# Patient Record
Sex: FEMALE | Race: WHITE | NOT HISPANIC OR LATINO | Employment: UNEMPLOYED | URBAN - METROPOLITAN AREA
[De-identification: names, ages, dates, MRNs, and addresses within clinical notes are randomized per-mention and may not be internally consistent; named-entity substitution may affect disease eponyms.]

---

## 2017-06-03 RX ORDER — PANTOPRAZOLE SODIUM 40 MG/1
40 TABLET, DELAYED RELEASE ORAL DAILY
COMMUNITY
End: 2017-09-25

## 2017-06-03 RX ORDER — LANOLIN ALCOHOL/MO/W.PET/CERES
400 CREAM (GRAM) TOPICAL DAILY
COMMUNITY
End: 2017-09-25

## 2017-06-03 RX ORDER — POTASSIUM CHLORIDE 20 MEQ/1
20 TABLET, EXTENDED RELEASE ORAL DAILY
COMMUNITY
End: 2017-09-25

## 2017-06-03 RX ORDER — ERGOCALCIFEROL 1.25 MG/1
50000 CAPSULE ORAL
COMMUNITY
End: 2018-11-20

## 2017-06-03 RX ORDER — ZOLPIDEM TARTRATE 10 MG/1
5 TABLET ORAL NIGHTLY PRN
COMMUNITY
End: 2017-09-25

## 2017-06-03 RX ORDER — CYANOCOBALAMIN 1000 UG/ML
1000 INJECTION, SOLUTION INTRAMUSCULAR; SUBCUTANEOUS
COMMUNITY
End: 2017-09-25

## 2017-06-03 RX ORDER — HYDROXYCHLOROQUINE SULFATE 200 MG/1
200 TABLET, FILM COATED ORAL 2 TIMES DAILY
COMMUNITY
End: 2017-09-25

## 2017-09-25 ENCOUNTER — OFFICE VISIT (OUTPATIENT)
Dept: RHEUMATOLOGY | Facility: CLINIC | Age: 47
End: 2017-09-25
Payer: COMMERCIAL

## 2017-09-25 VITALS
DIASTOLIC BLOOD PRESSURE: 90 MMHG | BODY MASS INDEX: 26.47 KG/M2 | WEIGHT: 158.88 LBS | HEIGHT: 65 IN | SYSTOLIC BLOOD PRESSURE: 150 MMHG

## 2017-09-25 DIAGNOSIS — M05.79 RHEUMATOID ARTHRITIS INVOLVING MULTIPLE SITES WITH POSITIVE RHEUMATOID FACTOR: Primary | ICD-10-CM

## 2017-09-25 LAB
ALBUMIN SERPL-MCNC: 4 G/DL (ref 3.1–4.7)
ALP SERPL-CCNC: 49 IU/L (ref 40–104)
ALT (SGPT): 13 IU/L (ref 3–33)
AST SERPL-CCNC: 21 IU/L (ref 10–40)
BASOPHILS NFR BLD: 0.1 K/UL (ref 0–0.2)
BASOPHILS NFR BLD: 1 %
BILIRUB SERPL-MCNC: 0.9 MG/DL (ref 0.3–1)
BUN SERPL-MCNC: 6 MG/DL (ref 8–20)
CALCIUM SERPL-MCNC: 9 MG/DL (ref 7.7–10.4)
CHLORIDE: 105 MMOL/L (ref 98–110)
CO2 SERPL-SCNC: 23.3 MMOL/L (ref 22.8–31.6)
CREATININE: 0.8 MG/DL (ref 0.6–1.4)
EOSINOPHIL NFR BLD: 0.2 K/UL (ref 0–0.7)
EOSINOPHIL NFR BLD: 2.3 %
ERYTHROCYTE [DISTWIDTH] IN BLOOD BY AUTOMATED COUNT: 12.8 % (ref 11.7–14.9)
GLUCOSE: 99 MG/DL (ref 70–99)
GRAN #: 3.8 K/UL (ref 1.4–6.5)
GRAN%: 54.9 %
HCT VFR BLD AUTO: 42.8 % (ref 36–48)
HGB BLD-MCNC: 15.2 G/DL (ref 12–15)
IMMATURE GRANS (ABS): 0 K/UL (ref 0–1)
IMMATURE GRANULOCYTES: 0.3 %
LYMPH #: 2.5 K/UL (ref 1.2–3.4)
LYMPH%: 36.2 %
MCH RBC QN AUTO: 34.9 PG (ref 25–35)
MCHC RBC AUTO-ENTMCNC: 35.5 G/DL (ref 31–36)
MCV RBC AUTO: 98.4 FL (ref 79–98)
MONO #: 0.4 K/UL (ref 0.1–0.6)
MONO%: 5.3 %
NUCLEATED RBCS: 0 %
PLATELET # BLD AUTO: 319 K/UL (ref 140–440)
PMV BLD AUTO: 9.9 FL (ref 8.8–12.7)
POTASSIUM SERPL-SCNC: 3.8 MMOL/L (ref 3.5–5)
PROT SERPL-MCNC: 7.3 G/DL (ref 6–8.2)
RBC # BLD AUTO: 4.35 M/UL (ref 3.5–5.5)
SODIUM: 138 MMOL/L (ref 134–144)
WBC # BLD AUTO: 6.8 K/UL (ref 5–10)

## 2017-09-25 PROCEDURE — 99213 OFFICE O/P EST LOW 20 MIN: CPT | Mod: ,,, | Performed by: INTERNAL MEDICINE

## 2017-09-25 PROCEDURE — 3008F BODY MASS INDEX DOCD: CPT | Mod: ,,, | Performed by: INTERNAL MEDICINE

## 2017-09-25 RX ORDER — TRAMADOL HYDROCHLORIDE 50 MG/1
100 TABLET ORAL EVERY 8 HOURS PRN
Qty: 180 TABLET | Refills: 3 | Status: SHIPPED | OUTPATIENT
Start: 2017-09-25 | End: 2018-03-26 | Stop reason: SDUPTHER

## 2017-09-25 NOTE — PROGRESS NOTES
"       [facility] RHEUMATOLOGY            PROGRESS NOTE      Subjective:       Patient ID:   NAME: Ruma Nguyen : 1970     46 y.o. female    Referring Doc: No ref. provider found  Other Physicians:    Chief Complaint:  Rheumatoid Arthritis and Pain (8/10 left hand)      History of Present Illness:     Patient returns today for a regularly scheduled follow-up visit for    Rheumatoid arthritis   The patient did not restart plaquenil has not seen eye doctor stale. She is concerned about fluid retention. She will see her primary care physician in a few days. No cough or shortness of breath.    Pain in right third DIP joint.        ROS:   GEN:  No  fever, night sweats . weight is stable   + fatigue  SKIN: no rashes, no bruising, no ulcerations, no Raynaud's  HEENT: no HA's, No visual changes, no mucosal ulcers, no sicca symptoms,  CV:   no CP, SOB, PND, MUÑOZ, no orthopnea, no palpitations  PULM: normal with no SOB, cough, hemoptysis, sputum or pleuritic pain  GI:  no abdominal pain, nausea, vomiting, constipation, diarrhea, melanotic stools, BRBPR, hematemesis, no dysphagia  :   no dysuria  NEURO: no paresthesias, headaches, visual disturbances, muscle weakness  MUSCULOSKELETAL:no joint swelling, prolonged AM stiffness, no back pain, no muscle pain  Allergies:  Review of patient's allergies indicates:  No Known Allergies    Medications:    Current Outpatient Prescriptions:     aspirin (ECOTRIN) 81 MG EC tablet, Take 81 mg by mouth once daily., Disp: , Rfl:     ergocalciferol (VITAMIN D2) 50,000 unit Cap, Take 50,000 Units by mouth every 7 days., Disp: , Rfl:     tramadol (ULTRAM) 50 mg tablet, Take 2 tablets (100 mg total) by mouth every 8 (eight) hours as needed., Disp: 180 tablet, Rfl: 3    PMHx/PSHx Updates    Objective:     Vitals:  Blood pressure (!) 150/90, height 5' 5" (1.651 m), weight 72.1 kg (158 lb 14.4 oz).    Physical Examination:   GEN: no apparent distress, comfortable; AAOx3  SKIN: no " rashes,no ulceration, no Raynaud's, no petechiae, no SQ nodules,  HEAD: normal  EYES: no pallor, no icterus  NECK: no masses, thyroid normal, trachea midline, no LAD/LN's, supple  CV: RRR with nI/!V ELYSSA murmur; l S1 and S2 reg. ,no gallop no rubs,   CHEST: Normal respiratory effort; CTAB; normal breath sounds; no wheeze or crackles  ABDOM: nontender and nondistended; soft; no masses; no rebound/guarding  MUSC/Skeletal: ROM normal; no crepitus; joints without synovitis,  no deformities  No joint swelling or tenderness of PIP, MCP, wrist, elbow, shoulder, or knee joints  EXTREM: no clubbing, cyanosis, no edema,normal  pulses   NEURO: grossly intact; motor WNL; AAOx3; ,  PSYCH: normal mood, affect and behavior  LYMPH: normal cervical, supraclavicular          Labs:   Lab Results   Component Value Date    WBC 6.8 09/25/2017    HGB 15.2 (H) 09/25/2017    HCT 42.8 09/25/2017    MCV 98.4 (H) 09/25/2017     09/25/2017    CMP  @LASTLAB(NA,K,CL,CO2,GLU,BUN,Creatinine,Calcium,PROT,Albumin,Bilitot,Alkphos,AST,ALT,CRP,ESR,RF,CCP,HERNAN,SSA,CPK,uric acid) )@  I have reviewed all available lab results and radiology reports.    Radiology/Diagnostic Studies:        Assessment/Plan:   (1) 46 y.o. female with diagnosis of Rheumatoid arthritis.  She needs to see the eye doctor and eighth exam is okay restart Plaquenil as soon as possible      PLAN: Labs  She is going to see her primary care physician and has an appointment with a cardiologist.          Discussion:     I have explained all of the above in detail and the patient understands all of the current recommendation(s). I have answered all questions to the best of my ability and to their complete satisfaction.       The patient is to continue with the current management plan         RTC in         Electronically signed by Bella Crews MD

## 2017-09-26 LAB
CCP ANTIBODIES IGG/IGA: 9 UNITS (ref 0–19)
RHEUMATOID FACT SERPL-ACNC: 10.6 IU/ML (ref 0–13.9)

## 2017-09-27 LAB
CRP SERPL-MCNC: NORMAL MG/DL (ref 0–1.4)
MISCELLANEOUS LAB TEST ORDER: NORMAL

## 2017-10-10 PROBLEM — D68.61 ANTIPHOSPHOLIPID SYNDROME: Status: ACTIVE | Noted: 2017-10-10

## 2018-03-26 ENCOUNTER — OFFICE VISIT (OUTPATIENT)
Dept: RHEUMATOLOGY | Facility: CLINIC | Age: 48
End: 2018-03-26
Payer: COMMERCIAL

## 2018-03-26 VITALS — WEIGHT: 158 LBS | DIASTOLIC BLOOD PRESSURE: 72 MMHG | BODY MASS INDEX: 26.29 KG/M2 | SYSTOLIC BLOOD PRESSURE: 132 MMHG

## 2018-03-26 DIAGNOSIS — M05.79 RHEUMATOID ARTHRITIS INVOLVING MULTIPLE SITES WITH POSITIVE RHEUMATOID FACTOR: Primary | ICD-10-CM

## 2018-03-26 DIAGNOSIS — M54.2 NECK PAIN: ICD-10-CM

## 2018-03-26 DIAGNOSIS — D68.61 ANTIPHOSPHOLIPID SYNDROME: ICD-10-CM

## 2018-03-26 DIAGNOSIS — R20.2 PARESTHESIAS: ICD-10-CM

## 2018-03-26 LAB
ALBUMIN SERPL-MCNC: 3.9 G/DL (ref 3.1–4.7)
ALP SERPL-CCNC: 40 IU/L (ref 40–104)
ALT (SGPT): 12 IU/L (ref 3–33)
AST SERPL-CCNC: 19 IU/L (ref 10–40)
BASOPHILS NFR BLD: 0.1 K/UL (ref 0–0.2)
BASOPHILS NFR BLD: 1.1 %
BILIRUB SERPL-MCNC: 0.4 MG/DL (ref 0.3–1)
BUN SERPL-MCNC: 9 MG/DL (ref 8–20)
CALCIUM SERPL-MCNC: 9.1 MG/DL (ref 7.7–10.4)
CHLORIDE: 106 MMOL/L (ref 98–110)
CO2 SERPL-SCNC: 24.6 MMOL/L (ref 22.8–31.6)
CREATININE: 0.78 MG/DL (ref 0.6–1.4)
CRP SERPL-MCNC: 0.13 MG/DL (ref 0–1.4)
EOSINOPHIL NFR BLD: 0.2 K/UL (ref 0–0.7)
EOSINOPHIL NFR BLD: 3 %
ERYTHROCYTE [DISTWIDTH] IN BLOOD BY AUTOMATED COUNT: 12.3 % (ref 11.7–14.9)
GLUCOSE: 103 MG/DL (ref 70–99)
GRAN #: 2.5 K/UL (ref 1.4–6.5)
GRAN%: 45.5 %
HCT VFR BLD AUTO: 40.9 % (ref 36–48)
HGB BLD-MCNC: 14.5 G/DL (ref 12–15)
IMMATURE GRANS (ABS): 0 K/UL (ref 0–1)
IMMATURE GRANULOCYTES: 0 %
LYMPH #: 2.4 K/UL (ref 1.2–3.4)
LYMPH%: 44.9 %
MCH RBC QN AUTO: 35.3 PG (ref 25–35)
MCHC RBC AUTO-ENTMCNC: 35.5 G/DL (ref 31–36)
MCV RBC AUTO: 99.5 FL (ref 79–98)
MONO #: 0.3 K/UL (ref 0.1–0.6)
MONO%: 5.5 %
NUCLEATED RBCS: 0 %
PLATELET # BLD AUTO: 258 K/UL (ref 140–440)
PMV BLD AUTO: 9.6 FL (ref 8.8–12.7)
POTASSIUM SERPL-SCNC: 3.6 MMOL/L (ref 3.5–5)
PROT SERPL-MCNC: 7.1 G/DL (ref 6–8.2)
RBC # BLD AUTO: 4.11 M/UL (ref 3.5–5.5)
SODIUM: 139 MMOL/L (ref 134–144)
WBC # BLD AUTO: 5.4 K/UL (ref 5–10)

## 2018-03-26 PROCEDURE — 99213 OFFICE O/P EST LOW 20 MIN: CPT | Mod: ,,, | Performed by: INTERNAL MEDICINE

## 2018-03-26 RX ORDER — TRAMADOL HYDROCHLORIDE 50 MG/1
100 TABLET ORAL EVERY 8 HOURS PRN
Qty: 180 TABLET | Refills: 3 | Status: SHIPPED | OUTPATIENT
Start: 2018-03-26 | End: 2018-11-20 | Stop reason: SDUPTHER

## 2018-03-26 RX ORDER — HYDROXYCHLOROQUINE SULFATE 200 MG/1
200 TABLET, FILM COATED ORAL DAILY
COMMUNITY
End: 2018-03-26 | Stop reason: SDUPTHER

## 2018-03-26 RX ORDER — HYDROXYCHLOROQUINE SULFATE 200 MG/1
200 TABLET, FILM COATED ORAL DAILY
Qty: 60 TABLET | Refills: 3 | Status: SHIPPED | OUTPATIENT
Start: 2018-03-26 | End: 2018-06-27 | Stop reason: SDUPTHER

## 2018-03-26 NOTE — PROGRESS NOTES
[facility] RHEUMATOLOGY            PROGRESS NOTE      Subjective:       Patient ID:   NAME: Ruma Nguyen : 1970     47 y.o. female    Referring Doc: No ref. provider found  Other Physicians:    Chief Complaint:  Rheumatoid Arthritis      History of Present Illness:     Patient returns today for a regularly scheduled follow-up visit for   Rheumatoid  arthritis    The patient is doing well. No chest pains cough or shortness of breath. She has been experiencing neck pain with numbness and tingling radiating  into the right arm.  No muscle weakness.  No chest pains cough or shortness of breath. No prolonged morning stiffness or joint swelling.        ROS:   GEN:  No  fever, night sweats . weight is stable   + fatigue  SKIN: no rashes, no bruising, no ulcerations, no Raynaud's  HEENT: no HA's, No visual changes, no mucosal ulcers, no sicca symptoms,  CV:   no CP, SOB, PND, MUÑOZ, no orthopnea, no palpitations  PULM: normal with no SOB, cough, hemoptysis, sputum or pleuritic pain  GI:  no abdominal pain, nausea, vomiting, constipation, diarrhea, melanotic stools, BRBPR, hematemesis, no dysphagia  :   no dysuria  NEURO: no paresthesias, headaches, visual disturbances, muscle weakness  MUSCULOSKELETAL:no joint swelling, prolonged AM stiffness, + cervical back pain, no muscle pain  Allergies:  Review of patient's allergies indicates:  No Known Allergies    Medications:    Current Outpatient Prescriptions:     aspirin (ECOTRIN) 81 MG EC tablet, Take 81 mg by mouth once daily., Disp: , Rfl:     ergocalciferol (VITAMIN D2) 50,000 unit Cap, Take 50,000 Units by mouth every 7 days., Disp: , Rfl:     guaifenesin-codeine 100-10 mg/5 ml (TUSSI-ORGANIDIN NR)  mg/5 mL syrup, Take 5 mLs by mouth every 6 (six) hours as needed for Cough., Disp: 120 mL, Rfl: 0    hydroxychloroquine (PLAQUENIL) 200 mg tablet, Take 1 tablet (200 mg total) by mouth once daily., Disp: 60 tablet, Rfl: 3    traMADol (ULTRAM) 50 mg  tablet, Take 2 tablets (100 mg total) by mouth every 8 (eight) hours as needed., Disp: 180 tablet, Rfl: 3    PMHx/PSHx Updates:        Last Eye Exam      Objective:     Vitals:  Blood pressure 132/72, weight 71.7 kg (158 lb).    Physical Examination:   GEN: no apparent distress, comfortable; AAOx3  SKIN: no rashes,no ulceration, no Raynaud's, no petechiae, no SQ nodules,  HEAD: normal  EYES: no pallor, no icterus,  NECK: no masses, thyroid normal, trachea midline, no LAD/LN's, supple  CV: RRR with no murmur; l S1 and S2 reg. ,no gallop no rubs,   CHEST: Normal respiratory effort; CTAB; normal breath sounds; no wheeze or crackles  MUSC/Skeletal: ROM normal; no crepitus; joints without synovitis,  no deformities  No joint swelling or tenderness of PIP, MCP, wrist, elbow, shoulder, or knee joints  EXTREM: no clubbing, cyanosis, no edema,normal  pulses   NEURO: grossly intact; motor WNL; AAOx3; , DTR's symmetric  PSYCH: normal mood, affect and behavior  LYMPH: normal cervical, supraclavicular          Labs:   Lab Results   Component Value Date    WBC 6.8 09/25/2017    HGB 15.2 (H) 09/25/2017    HCT 42.8 09/25/2017    MCV 98.4 (H) 09/25/2017     09/25/2017    CMP  @LASTLAB(NA,K,CL,CO2,GLU,BUN,Creatinine,Calcium,PROT,Albumin,Bilitot,Alkphos,AST,ALT,CRP,ESR,RF,CCP,HERNAN,SSA,CPK,uric acid) )@  I have reviewed all available lab results and radiology reports.    Radiology/Diagnostic Studies:        Assessment/Plan:   (1) 47 y.o. female with diagnosis of Seronegative rheumatoid arthritis. This is stable.  2) neck pain with paresthesias on the right arm. Need to rule out HNP  3) history of antiphospholipid antibody being positive    CBC CMP CRP HERNAN SSA         Discussion:     I have explained all of the above in detail and the patient understands all of the current recommendation(s). I have answered all questions to the best of my ability and to their complete satisfaction.       The patient is to continue with the current  management plan         RTC in   3 months or before if needed      Electronically signed by Bella Crews MD

## 2018-03-29 LAB — ANA SER-ACNC: NEGATIVE

## 2018-06-27 ENCOUNTER — OFFICE VISIT (OUTPATIENT)
Dept: RHEUMATOLOGY | Facility: CLINIC | Age: 48
End: 2018-06-27
Payer: COMMERCIAL

## 2018-06-27 VITALS
BODY MASS INDEX: 25.28 KG/M2 | WEIGHT: 151.88 LBS | DIASTOLIC BLOOD PRESSURE: 72 MMHG | SYSTOLIC BLOOD PRESSURE: 118 MMHG

## 2018-06-27 DIAGNOSIS — M54.9 BACK PAIN, UNSPECIFIED BACK LOCATION, UNSPECIFIED BACK PAIN LATERALITY, UNSPECIFIED CHRONICITY: ICD-10-CM

## 2018-06-27 DIAGNOSIS — M06.019 RHEUMATOID ARTHRITIS INVOLVING SHOULDER WITH NEGATIVE RHEUMATOID FACTOR, UNSPECIFIED LATERALITY: Primary | ICD-10-CM

## 2018-06-27 PROCEDURE — 3008F BODY MASS INDEX DOCD: CPT | Mod: ,,, | Performed by: INTERNAL MEDICINE

## 2018-06-27 PROCEDURE — 99213 OFFICE O/P EST LOW 20 MIN: CPT | Mod: ,,, | Performed by: INTERNAL MEDICINE

## 2018-06-27 RX ORDER — TIZANIDINE 4 MG/1
4 TABLET ORAL EVERY 6 HOURS PRN
Qty: 30 TABLET | Refills: 1 | Status: SHIPPED | OUTPATIENT
Start: 2018-06-27 | End: 2018-07-07

## 2018-06-27 RX ORDER — KETOROLAC TROMETHAMINE 30 MG/ML
30 INJECTION, SOLUTION INTRAMUSCULAR; INTRAVENOUS ONCE
Status: SHIPPED | OUTPATIENT
Start: 2018-06-27 | End: 2018-06-30

## 2018-06-27 RX ORDER — HYDROXYCHLOROQUINE SULFATE 200 MG/1
200 TABLET, FILM COATED ORAL DAILY
Qty: 60 TABLET | Refills: 3 | Status: SHIPPED | OUTPATIENT
Start: 2018-06-27 | End: 2018-11-20 | Stop reason: SDUPTHER

## 2018-06-27 NOTE — PROGRESS NOTES
[facility] RHEUMATOLOGY            PROGRESS NOTE      Subjective:       Patient ID:   NAME: Ruma Nguyen : 1970     47 y.o. female    Referring Doc: No ref. provider found  Other Physicians:    Chief Complaint:  Rheumatoid Arthritis (Back and Neck Pain)      History of Present Illness:     Patient returns today for a regularly scheduled follow-up visit for  Rheumatoid arthritis.     The patient complaining of pain in the sacral area for the past week. No history of trauma that she fell and pain after twisting her torso and bending while cleaning her bathtub.  No paresthesias. No abdominal complaints. No prolonged morning stiffness or joint swelling. She has occasional neck pain. She did not do MRI of her cervical spine because her neck pain resolved completely until recently.            ROS:   GEN:  No  fever, night sweats . weight is stable   + fatigue  SKIN: no rashes, no bruising, no ulcerations, no Raynaud's  HEENT: no HA's, No visual changes, no mucosal ulcers, no sicca symptoms,  CV:   no CP, SOB, PND, MUÑOZ, no orthopnea, no palpitations  PULM: normal with no SOB, cough, hemoptysis, sputum or pleuritic pain  GI:  no abdominal pain, nausea, vomiting, constipation, diarrhea, melanotic stools, BRBPR, hematemesis, no dysphagia  :   no dysuria  NEURO: no paresthesias, headaches, visual disturbances, muscle weakness  MUSCULOSKELETAL:no joint swelling, prolonged AM stiffness, + back pain, + muscle pain  Allergies:  Review of patient's allergies indicates:  No Known Allergies    Medications:    Current Outpatient Prescriptions:     aspirin (ECOTRIN) 81 MG EC tablet, Take 81 mg by mouth once daily., Disp: , Rfl:     ergocalciferol (VITAMIN D2) 50,000 unit Cap, Take 50,000 Units by mouth every 7 days., Disp: , Rfl:     guaifenesin-codeine 100-10 mg/5 ml (TUSSI-ORGANIDIN NR)  mg/5 mL syrup, Take 5 mLs by mouth every 6 (six) hours as needed for Cough., Disp: 120 mL, Rfl: 0    hydroxychloroquine  (PLAQUENIL) 200 mg tablet, Take 1 tablet (200 mg total) by mouth once daily., Disp: 60 tablet, Rfl: 3    traMADol (ULTRAM) 50 mg tablet, Take 2 tablets (100 mg total) by mouth every 8 (eight) hours as needed., Disp: 180 tablet, Rfl: 3    tiZANidine (ZANAFLEX) 4 MG tablet, Take 1 tablet (4 mg total) by mouth every 6 (six) hours as needed., Disp: 30 tablet, Rfl: 1    Current Facility-Administered Medications:     ketorolac injection 30 mg, 30 mg, Intramuscular, Once, Bella Crews MD    PMHx/PSHx Updates:      Last Eye Exam: UTD      Objective:     Vitals:  Blood pressure 118/72, weight 68.9 kg (151 lb 14.4 oz).    Physical Examination:   GEN: no apparent distress, comfortable; AAOx3  SKIN: no rashes,no ulceration, no Raynaud's, no petechiae, no SQ nodules,  HEAD: normal  EYES: no pallor, no icterus  NECK: no masses, thyroid normal, trachea midline, no LAD/LN's, supple  CV: RRR with no murmur; l S1 and S2 reg. ,no gallop no rubs,   CHEST: Normal respiratory effort; CTAB; normal breath sounds; no wheeze or crackles  ABDOM: nontender and nondistended; soft; no masses; no rebound/guarding  MUSC/Skeletal: ROM normal; no crepitus; joints without synovitis,  no deformities  No joint swelling or tenderness of PIP, MCP, wrist, elbow, shoulder, or knee joints.I tenderness on the left sacroiliac area.  EXTREM: no clubbing, cyanosis, no edema,normal  pulses   NEURO: grossly intact; motor WNL; AAOx3;   PSYCH: normal mood, affect and behavior  LYMPH: normal cervical, supraclavicular          Labs:   Lab Results   Component Value Date    WBC 5.4 03/26/2018    HGB 14.5 03/26/2018    HCT 40.9 03/26/2018    MCV 99.5 (H) 03/26/2018     03/26/2018    CMP  @LASTLAB(NA,K,CL,CO2,GLU,BUN,Creatinine,Calcium,PROT,Albumin,Bilitot,Alkphos,AST,ALT,CRP,ESR,RF,CCP,HERNAN,SSA,CPK,uric acid) )@  I have reviewed all available lab results and radiology reports.    Radiology/Diagnostic Studies:        Assessment/Plan:   (1) 47 y.o.  female with diagnosis of Rheumatoid arthritis. This is stable.  2) acute low back pain, mainly in the sacral area. Probable sacroiliac sprain.    Plan:  Physical therapy.  Tizanidine 1-4 mg 3 times a day when necessary.      Discussion:     I have explained all of the above in detail and the patient understands all of the current recommendation(s). I have answered all questions to the best of my ability and to their complete satisfaction.       The patient is to continue with the current management plan         RTC in 4 months if doing well or before if she remains symptomatic.        Electronically signed by Bella Crews MD

## 2018-09-24 PROBLEM — F17.200 SMOKER: Status: ACTIVE | Noted: 2018-09-24

## 2018-09-24 PROBLEM — M06.09 RHEUMATOID ARTHRITIS OF MULTIPLE SITES WITHOUT RHEUMATOID FACTOR: Status: ACTIVE | Noted: 2018-09-24

## 2018-11-20 ENCOUNTER — OFFICE VISIT (OUTPATIENT)
Dept: RHEUMATOLOGY | Facility: CLINIC | Age: 48
End: 2018-11-20
Payer: COMMERCIAL

## 2018-11-20 VITALS
WEIGHT: 157.13 LBS | SYSTOLIC BLOOD PRESSURE: 131 MMHG | DIASTOLIC BLOOD PRESSURE: 83 MMHG | HEART RATE: 62 BPM | BODY MASS INDEX: 26.14 KG/M2

## 2018-11-20 DIAGNOSIS — M06.019 RHEUMATOID ARTHRITIS INVOLVING SHOULDER WITH NEGATIVE RHEUMATOID FACTOR, UNSPECIFIED LATERALITY: Primary | ICD-10-CM

## 2018-11-20 LAB
ALBUMIN SERPL-MCNC: 4.1 G/DL (ref 3.1–4.7)
ALP SERPL-CCNC: 51 IU/L (ref 40–104)
ALT (SGPT): 13 IU/L (ref 3–33)
AST SERPL-CCNC: 17 IU/L (ref 10–40)
BASOPHILS NFR BLD: 0.1 K/UL (ref 0–0.2)
BASOPHILS NFR BLD: 0.9 %
BILIRUB SERPL-MCNC: 0.5 MG/DL (ref 0.3–1)
BUN SERPL-MCNC: 8 MG/DL (ref 8–20)
CALCIUM SERPL-MCNC: 9 MG/DL (ref 7.7–10.4)
CHLORIDE: 105 MMOL/L (ref 98–110)
CO2 SERPL-SCNC: 26.6 MMOL/L (ref 22.8–31.6)
CREATININE: 0.83 MG/DL (ref 0.6–1.4)
EOSINOPHIL NFR BLD: 0.2 K/UL (ref 0–0.7)
EOSINOPHIL NFR BLD: 2.8 %
ERYTHROCYTE [DISTWIDTH] IN BLOOD BY AUTOMATED COUNT: 12.8 % (ref 11.7–14.9)
GLUCOSE: 82 MG/DL (ref 70–99)
GRAN #: 3.1 K/UL (ref 1.4–6.5)
GRAN%: 47.8 %
HCT VFR BLD AUTO: 43.1 % (ref 36–48)
HGB BLD-MCNC: 15.2 G/DL (ref 12–15)
IMMATURE GRANS (ABS): 0 K/UL (ref 0–1)
IMMATURE GRANULOCYTES: 0.2 %
LYMPH #: 2.9 K/UL (ref 1.2–3.4)
LYMPH%: 44.4 %
MAGNESIUM SERPL-MCNC: 1.9 MG/DL (ref 1.5–2.6)
MCH RBC QN AUTO: 35.9 PG (ref 25–35)
MCHC RBC AUTO-ENTMCNC: 35.3 G/DL (ref 31–36)
MCV RBC AUTO: 101.9 FL (ref 79–98)
MONO #: 0.3 K/UL (ref 0.1–0.6)
MONO%: 3.9 %
NUCLEATED RBCS: 0 %
PLATELET # BLD AUTO: 307 K/UL (ref 140–440)
PMV BLD AUTO: 9.3 FL (ref 8.8–12.7)
POTASSIUM SERPL-SCNC: 3.6 MMOL/L (ref 3.5–5)
PROT SERPL-MCNC: 7.5 G/DL (ref 6–8.2)
RBC # BLD AUTO: 4.23 M/UL (ref 3.5–5.5)
SODIUM: 140 MMOL/L (ref 134–144)
WBC # BLD AUTO: 6.5 K/UL (ref 5–10)

## 2018-11-20 PROCEDURE — 99213 OFFICE O/P EST LOW 20 MIN: CPT | Mod: ,,, | Performed by: INTERNAL MEDICINE

## 2018-11-20 RX ORDER — HYDROXYCHLOROQUINE SULFATE 200 MG/1
200 TABLET, FILM COATED ORAL DAILY
Qty: 60 TABLET | Refills: 3 | Status: SHIPPED | OUTPATIENT
Start: 2018-11-20 | End: 2019-11-23 | Stop reason: SDUPTHER

## 2018-11-20 RX ORDER — TRAMADOL HYDROCHLORIDE 50 MG/1
100 TABLET ORAL EVERY 8 HOURS PRN
Qty: 180 TABLET | Refills: 3 | Status: SHIPPED | OUTPATIENT
Start: 2018-11-20 | End: 2019-04-22 | Stop reason: SDUPTHER

## 2018-11-20 NOTE — PROGRESS NOTES
[facility] RHEUMATOLOGY            PROGRESS NOTE      Subjective:       Patient ID:   NAME: Ruma Nguyen : 1970     47 y.o. female    Referring Doc: No ref. provider found  Other Physicians:    Chief Complaint:  Rheumatoid Arthritis      History of Present Illness:     Patient returns today for a regularly scheduled follow-up visit for   Rheumatoid arthritis    The patient has occasional arthralgias and finger joints but no joint swelling. No prolonged morning stiffness. At present with sinus congestion. No chest pains. Occasional cough. No shortness of breath.no fevers            ROS:   GEN:  No  fever, night sweats . weight is stable   + mild  fatigue  SKIN: no rashes, no bruising, no ulcerations, no Raynaud's  HEENT: no HA's, No visual changes, no mucosal ulcers, no sicca symptoms,  CV:   no CP, SOB, PND, MUÑOZ, no orthopnea, no palpitations  PULM: normal with no SOB, cough, hemoptysis, sputum or pleuritic pain  GI:  no abdominal pain, nausea, vomiting, constipation, diarrhea, melanotic stools, BRBPR, hematemesis, no dysphagia  :   no dysuria  NEURO: no paresthesias, headaches, visual disturbances, muscle weakness  MUSCULOSKELETAL:no joint swelling, prolonged AM stiffness, no back pain, + muscle pain  Allergies:  Review of patient's allergies indicates:  No Known Allergies    Medications:    Current Outpatient Medications:     aspirin (ECOTRIN) 81 MG EC tablet, Take 81 mg by mouth once daily., Disp: , Rfl:     hydroxychloroquine (PLAQUENIL) 200 mg tablet, Take 1 tablet (200 mg total) by mouth once daily., Disp: 60 tablet, Rfl: 3    traMADol (ULTRAM) 50 mg tablet, Take 2 tablets (100 mg total) by mouth every 8 (eight) hours as needed., Disp: 180 tablet, Rfl: 3    PMHx/PSHx Updates:      Last Eye Exam UTD    Objective:     Vitals:  Blood pressure 131/83, pulse 62, weight 71.3 kg (157 lb 1.6 oz).    Physical Examination:   GEN: no apparent distress, comfortable; AAOx3  SKIN: no rashes,no  ulceration, no Raynaud's, no petechiae, no SQ nodules,  HEAD: normal  EYES: no pallor, no icterus  NECK: no masses, thyroid normal, trachea midline, no LAD/LN's, supple  CV: RRR with no murmur; l S1 and S2 reg. ,no gallop no rubs,   CHEST: Normal respiratory effort; CTAB; normal breath sounds; no wheeze or crackles  MUSC/Skeletal: ROM normal; no crepitus; joints without synovitis,  no deformities  No joint swelling or tenderness of PIP, MCP, wrist, elbow, shoulder, or knee joints  EXTREM: no clubbing, cyanosis, no edema,normal  pulses   NEURO: grossly intact; motor WNL; AAOx3; ,  PSYCH: normal mood, affect and behavior  LYMPH: normal cervical, supraclavicular          Labs:   Lab Results   Component Value Date    WBC 4.81 09/22/2018    HGB 15.3 09/22/2018    HCT 42.9 09/22/2018    MCV 98 09/22/2018     09/22/2018    CMP  @LASTLAB(NA,K,CL,CO2,GLU,BUN,Creatinine,Calcium,PROT,Albumin,Bilitot,Alkphos,AST,ALT,CRP,ESR,RF,CCP,HERNAN,SSA,CPK,uric acid) )@  I have reviewed all available lab results and radiology reports.    Radiology/Diagnostic Studies:    Eye exam is up-to-date    Assessment/Plan:   (1) 47 y.o. female with diagnosis of rheumatoid arthritis. She is stable on Plaquenil.  2) Sinus Congestion: will fu with PCP      CBC CMP        Discussion:     I have explained all of the above in detail and the patient understands all of the current recommendation(s). I have answered all questions to the best of my ability and to their complete satisfaction.       The patient is to continue with the current management plan         RTC in         Electronically signed by Bella Crews MD

## 2018-11-23 LAB
B2 GLYCOPROTEIN I IGA: 36 GPI IGA UNITS (ref 0–25)
B2 GLYCOPROTEIN I IGG: <9 GPI IGG UNITS (ref 0–20)
B2 GLYCOPROTEIN I IGM: 81 GPI IGM UNITS (ref 0–32)
CARDIOLIPIN IGA SER IA-ACNC: 21 APL U/ML (ref 0–11)
CARDIOLIPIN IGG SER IA-ACNC: 17 GPL U/ML (ref 0–14)
CARDIOLIPIN IGM SER IA-ACNC: 85 MPL U/ML (ref 0–12)

## 2019-04-11 ENCOUNTER — OFFICE VISIT (OUTPATIENT)
Dept: RHEUMATOLOGY | Facility: CLINIC | Age: 49
End: 2019-04-11
Payer: COMMERCIAL

## 2019-04-11 VITALS — DIASTOLIC BLOOD PRESSURE: 74 MMHG | BODY MASS INDEX: 26.54 KG/M2 | WEIGHT: 159.5 LBS | SYSTOLIC BLOOD PRESSURE: 118 MMHG

## 2019-04-11 DIAGNOSIS — M06.00 RHEUMATOID ARTHRITIS WITH NEGATIVE RHEUMATOID FACTOR, INVOLVING UNSPECIFIED SITE: ICD-10-CM

## 2019-04-11 DIAGNOSIS — R76.0 ANTIPHOSPHOLIPID ANTIBODY POSITIVE: Primary | ICD-10-CM

## 2019-04-11 LAB
ALBUMIN SERPL-MCNC: 3.8 G/DL (ref 3.1–4.7)
ALP SERPL-CCNC: 48 IU/L (ref 40–104)
ALT (SGPT): 14 IU/L (ref 3–33)
AST SERPL-CCNC: 21 IU/L (ref 10–40)
BASOPHILS NFR BLD: 0 K/UL (ref 0–0.2)
BASOPHILS NFR BLD: 0.6 %
BILIRUB SERPL-MCNC: 0.8 MG/DL (ref 0.3–1)
BUN SERPL-MCNC: 8 MG/DL (ref 8–20)
CALCIUM SERPL-MCNC: 9 MG/DL (ref 7.7–10.4)
CHLORIDE: 103 MMOL/L (ref 98–110)
CO2 SERPL-SCNC: 25.7 MMOL/L (ref 22.8–31.6)
CREATININE: 0.71 MG/DL (ref 0.6–1.4)
CRP SERPL-MCNC: 0.7 MG/DL (ref 0–1.4)
EOSINOPHIL NFR BLD: 0.1 K/UL (ref 0–0.7)
EOSINOPHIL NFR BLD: 1.7 %
ERYTHROCYTE [DISTWIDTH] IN BLOOD BY AUTOMATED COUNT: 12.9 % (ref 11.7–14.9)
GLUCOSE: 96 MG/DL (ref 70–99)
GRAN #: 3.4 K/UL (ref 1.4–6.5)
GRAN%: 52.4 %
HCT VFR BLD AUTO: 42 % (ref 36–48)
HGB BLD-MCNC: 14.8 G/DL (ref 12–15)
IMMATURE GRANS (ABS): 0 K/UL (ref 0–1)
IMMATURE GRANULOCYTES: 0.3 %
LYMPH #: 2.6 K/UL (ref 1.2–3.4)
LYMPH%: 39.5 %
MCH RBC QN AUTO: 35.1 PG (ref 25–35)
MCHC RBC AUTO-ENTMCNC: 35.2 G/DL (ref 31–36)
MCV RBC AUTO: 99.5 FL (ref 79–98)
MONO #: 0.4 K/UL (ref 0.1–0.6)
MONO%: 5.5 %
NUCLEATED RBCS: 0 %
PLATELET # BLD AUTO: 257 K/UL (ref 140–440)
PMV BLD AUTO: 9.7 FL (ref 8.8–12.7)
POTASSIUM SERPL-SCNC: 4 MMOL/L (ref 3.5–5)
PROT SERPL-MCNC: 7.4 G/DL (ref 6–8.2)
RBC # BLD AUTO: 4.22 M/UL (ref 3.5–5.5)
SODIUM: 138 MMOL/L (ref 134–144)
WBC # BLD AUTO: 6.6 K/UL (ref 5–10)

## 2019-04-11 PROCEDURE — 99213 PR OFFICE/OUTPT VISIT, EST, LEVL III, 20-29 MIN: ICD-10-PCS | Mod: ,,, | Performed by: INTERNAL MEDICINE

## 2019-04-11 PROCEDURE — 99213 OFFICE O/P EST LOW 20 MIN: CPT | Mod: ,,, | Performed by: INTERNAL MEDICINE

## 2019-04-11 RX ORDER — HYDROXYCHLOROQUINE SULFATE 200 MG/1
200 TABLET, FILM COATED ORAL DAILY
Qty: 90 TABLET | Refills: 1 | Status: SHIPPED | OUTPATIENT
Start: 2019-04-11 | End: 2020-06-11 | Stop reason: SDUPTHER

## 2019-04-11 NOTE — PROGRESS NOTES
[facility] RHEUMATOLOGY            PROGRESS NOTE      Subjective:       Patient ID:   NAME: Ruma Nguyen : 1970     48 y.o. female    Referring Doc: No ref. provider found  Other Physicians:    Chief Complaint:  Rheumatoid Arthritis      History of Present Illness:     Patient returns today for a regularly scheduled follow-up visit for  Seronegative RA and hx + antiphospholipids     The patient arthralgias but no joint swelling. She has had open respiratory infection and G gastroenteritis since her last visit doing okay now except mild nasal congestion. Occasional cough. No chest pains. No shortness of breath. No prolonged morning stiffness or joint swelling.            ROS:   GEN:  No  fever, night sweats . weight is stable   + fatigue  SKIN: no rashes, no bruising, no ulcerations, no Raynaud's  HEENT: no HA's, No visual changes, no mucosal ulcers, no sicca symptoms,  CV:   no CP, SOB, PND, MUÑOZ, no orthopnea, no palpitations  PULM: normal with no SOB, cough, hemoptysis, sputum or pleuritic pain  GI:  no abdominal pain, nausea, vomiting, constipation, diarrhea, melanotic stools, BRBPR, hematemesis, no dysphagia  :   no dysuria  NEURO: no paresthesias, headaches, visual disturbances, muscle weakness  MUSCULOSKELETAL:no joint swelling, prolonged AM stiffness, no back pain, + muscle pain  Allergies:  Review of patient's allergies indicates:  No Known Allergies    Medications:    Current Outpatient Medications:     aspirin (ECOTRIN) 81 MG EC tablet, Take 81 mg by mouth once daily., Disp: , Rfl:     hydroxychloroquine (PLAQUENIL) 200 mg tablet, Take 1 tablet (200 mg total) by mouth once daily., Disp: 60 tablet, Rfl: 3    traMADol (ULTRAM) 50 mg tablet, Take 2 tablets (100 mg total) by mouth every 8 (eight) hours as needed., Disp: 180 tablet, Rfl: 3    PMHx/PSHx Updates:        Last Eye Exam UTD      Objective:     Vitals:  Blood pressure 118/74, weight 72.3 kg (159 lb 8 oz).    Physical Examination:    GEN: no apparent distress, comfortable; AAOx3  SKIN: no rashes,no ulceration, no Raynaud's, no petechiae, no SQ nodules,  HEAD: normal  EYES: no pallor, no icterus  NECK: no masses, thyroid normal, trachea midline, no LAD/LN's, supple  CV: RRR with no murmur; l S1 and S2 reg. ,no gallop no rubs,   CHEST: Normal respiratory effort; CTAB; normal breath sounds; no wheeze or crackles  MUSC/Skeletal: ROM normal; no crepitus; joints without synovitis,  no deformities  No joint swelling or tenderness of PIP, MCP, wrist, elbow, shoulder, or knee joints  EXTREM: no clubbing, cyanosis, no edema,normal  pulses   NEURO: grossly intact; motor WNL; AAOx3; ,   PSYCH: normal mood, affect and behavior  LYMPH: normal cervical, supraclavicular          Labs:   Lab Results   Component Value Date    WBC 6.5 11/20/2018    HGB 15.2 (H) 11/20/2018    HCT 43.1 11/20/2018    .9 (H) 11/20/2018     11/20/2018    CMP  @LASTLAB(NA,K,CL,CO2,GLU,BUN,Creatinine,Calcium,PROT,Albumin,Bilitot,Alkphos,AST,ALT,CRP,ESR,RF,CCP,HERNAN,SSA,CPK,uric acid) )@  I have reviewed all available lab results and radiology reports.    Radiology/Diagnostic Studies:        Assessment/Plan:   (1) 48 y.o. female with diagnosis of Rheumatoid Arthritis.( sero neg)  This is stable  2) history of positive antiphospholipid. No history of thromboembolic events.  3)Osteoarthritis      CBC CMP urinalysis        Discussion:     I have explained all of the above in detail and the patient understands all of the current recommendation(s). I have answered all questions to the best of my ability and to their complete satisfaction.       The patient is to continue with the current management plan         RTC in   4 months      Electronically signed by Bella Crews MD

## 2019-04-15 DIAGNOSIS — E04.1 RIGHT THYROID NODULE: Primary | ICD-10-CM

## 2019-04-15 NOTE — PROGRESS NOTES
Patient notified of UTI. Patient denies bactrim/sulfa allergy. Bactrim DS #10 one po bid x 5 days 0 refills called into pharmacy.

## 2019-04-22 RX ORDER — TRAMADOL HYDROCHLORIDE 50 MG/1
TABLET ORAL
Qty: 180 TABLET | Refills: 1 | Status: SHIPPED | OUTPATIENT
Start: 2019-04-22 | End: 2019-07-17 | Stop reason: SDUPTHER

## 2019-07-17 RX ORDER — TRAMADOL HYDROCHLORIDE 50 MG/1
TABLET ORAL
Qty: 180 TABLET | Refills: 1 | Status: SHIPPED | OUTPATIENT
Start: 2019-07-17 | End: 2019-10-02 | Stop reason: SDUPTHER

## 2019-07-30 ENCOUNTER — OFFICE VISIT (OUTPATIENT)
Dept: RHEUMATOLOGY | Facility: CLINIC | Age: 49
End: 2019-07-30
Payer: COMMERCIAL

## 2019-07-30 ENCOUNTER — LAB VISIT (OUTPATIENT)
Dept: LAB | Facility: HOSPITAL | Age: 49
End: 2019-07-30
Attending: INTERNAL MEDICINE
Payer: COMMERCIAL

## 2019-07-30 VITALS — WEIGHT: 160 LBS | SYSTOLIC BLOOD PRESSURE: 120 MMHG | BODY MASS INDEX: 26.63 KG/M2 | DIASTOLIC BLOOD PRESSURE: 72 MMHG

## 2019-07-30 DIAGNOSIS — M05.79 RHEUMATOID ARTHRITIS INVOLVING MULTIPLE SITES WITH POSITIVE RHEUMATOID FACTOR: Primary | ICD-10-CM

## 2019-07-30 DIAGNOSIS — M75.51 BURSITIS OF SHOULDER, RIGHT: ICD-10-CM

## 2019-07-30 DIAGNOSIS — M05.79 RHEUMATOID ARTHRITIS INVOLVING MULTIPLE SITES WITH POSITIVE RHEUMATOID FACTOR: ICD-10-CM

## 2019-07-30 LAB
ALBUMIN SERPL BCP-MCNC: 4.1 G/DL (ref 3.5–5.2)
ALP SERPL-CCNC: 40 U/L (ref 55–135)
ALT SERPL W/O P-5'-P-CCNC: 14 U/L (ref 10–44)
ANION GAP SERPL CALC-SCNC: 8 MMOL/L (ref 8–16)
AST SERPL-CCNC: 17 U/L (ref 10–40)
BASOPHILS # BLD AUTO: 0.06 K/UL (ref 0–0.2)
BASOPHILS NFR BLD: 1 % (ref 0–1.9)
BILIRUB SERPL-MCNC: 0.7 MG/DL (ref 0.1–1)
BUN SERPL-MCNC: 7 MG/DL (ref 6–20)
CALCIUM SERPL-MCNC: 9.2 MG/DL (ref 8.7–10.5)
CHLORIDE SERPL-SCNC: 106 MMOL/L (ref 95–110)
CO2 SERPL-SCNC: 26 MMOL/L (ref 23–29)
CREAT SERPL-MCNC: 0.7 MG/DL (ref 0.5–1.4)
CRP SERPL-MCNC: 1.01 MG/DL (ref 0–0.75)
DIFFERENTIAL METHOD: ABNORMAL
EOSINOPHIL # BLD AUTO: 0.2 K/UL (ref 0–0.5)
EOSINOPHIL NFR BLD: 2.5 % (ref 0–8)
ERYTHROCYTE [DISTWIDTH] IN BLOOD BY AUTOMATED COUNT: 12.9 % (ref 11.5–14.5)
EST. GFR  (AFRICAN AMERICAN): >60 ML/MIN/1.73 M^2
EST. GFR  (NON AFRICAN AMERICAN): >60 ML/MIN/1.73 M^2
GLUCOSE SERPL-MCNC: 83 MG/DL (ref 70–110)
HCT VFR BLD AUTO: 41.7 % (ref 37–48.5)
HGB BLD-MCNC: 14.6 G/DL (ref 12–16)
IMM GRANULOCYTES # BLD AUTO: 0.01 K/UL (ref 0–0.04)
IMM GRANULOCYTES NFR BLD AUTO: 0.2 % (ref 0–0.5)
LYMPHOCYTES # BLD AUTO: 2.3 K/UL (ref 1–4.8)
LYMPHOCYTES NFR BLD: 38 % (ref 18–48)
MCH RBC QN AUTO: 35.3 PG (ref 27–31)
MCHC RBC AUTO-ENTMCNC: 35 G/DL (ref 32–36)
MCV RBC AUTO: 101 FL (ref 82–98)
MONOCYTES # BLD AUTO: 0.4 K/UL (ref 0.3–1)
MONOCYTES NFR BLD: 5.9 % (ref 4–15)
NEUTROPHILS # BLD AUTO: 3.1 K/UL (ref 1.8–7.7)
NEUTROPHILS NFR BLD: 52.4 % (ref 38–73)
NRBC BLD-RTO: 0 /100 WBC
PLATELET # BLD AUTO: 260 K/UL (ref 150–350)
PMV BLD AUTO: 10.3 FL (ref 9.2–12.9)
POTASSIUM SERPL-SCNC: 3.8 MMOL/L (ref 3.5–5.1)
PROT SERPL-MCNC: 7.5 G/DL (ref 6–8.4)
RBC # BLD AUTO: 4.14 M/UL (ref 4–5.4)
SODIUM SERPL-SCNC: 140 MMOL/L (ref 136–145)
WBC # BLD AUTO: 5.92 K/UL (ref 3.9–12.7)

## 2019-07-30 PROCEDURE — 85651 RBC SED RATE NONAUTOMATED: CPT

## 2019-07-30 PROCEDURE — 85025 COMPLETE CBC W/AUTO DIFF WBC: CPT

## 2019-07-30 PROCEDURE — 80053 COMPREHEN METABOLIC PANEL: CPT

## 2019-07-30 PROCEDURE — 99213 PR OFFICE/OUTPT VISIT, EST, LEVL III, 20-29 MIN: ICD-10-PCS | Mod: S$GLB,,, | Performed by: INTERNAL MEDICINE

## 2019-07-30 PROCEDURE — 86140 C-REACTIVE PROTEIN: CPT

## 2019-07-30 PROCEDURE — 99213 OFFICE O/P EST LOW 20 MIN: CPT | Mod: S$GLB,,, | Performed by: INTERNAL MEDICINE

## 2019-07-30 NOTE — PROGRESS NOTES
[facility] RHEUMATOLOGY            PROGRESS NOTE      Subjective:       Patient ID:   NAME: Ruma Nguyen : 1970     48 y.o. female    Referring Doc: No ref. provider found  Other Physicians:    Chief Complaint:  Rheumatoid Arthritis      History of Present Illness:     Patient returns today for a regularly scheduled follow-up visit for    RA  The patient is doing well except pain in the right shoulder with abduction. No history of trauma. No paresthesias except occasional and feet. No chest pains cough or shortness of breath.  atient arthralgias and right fifth DIP joint.          ROS:   GEN:  No  fever, night sweats . weight is stable   + fatigue  SKIN: no rashes, no bruising, no ulcerations, no Raynaud's  HEENT: no HA's, No visual changes, no mucosal ulcers, no sicca symptoms,  CV:   no CP, SOB, PND, MUÑOZ, no orthopnea, no palpitations  PULM: normal with no SOB, cough, hemoptysis, sputum or pleuritic pain  GI:  no abdominal pain, nausea, vomiting, constipation, diarrhea, melanotic stools, BRBPR, hematemesis, no dysphagia  :   no dysuria  NEURO: no paresthesias, headaches, visual disturbances, muscle weakness  MUSCULOSKELETAL:no joint swelling, prolonged AM stiffness, no back pain, + muscle pain  Allergies:  Review of patient's allergies indicates:  No Known Allergies    Medications:    Current Outpatient Medications:     aspirin (ECOTRIN) 81 MG EC tablet, Take 81 mg by mouth once daily., Disp: , Rfl:     hydroxychloroquine (PLAQUENIL) 200 mg tablet, Take 1 tablet (200 mg total) by mouth once daily., Disp: 90 tablet, Rfl: 1    traMADol (ULTRAM) 50 mg tablet, TAKE 2 TABLETS BY MOUTH EVERY 8 HOURS AS NEEDED., Disp: 180 tablet, Rfl: 1    PMHx/PSHx Updates:      Last Eye Exam was due 6 months ago      Objective:     Vitals:  Blood pressure 120/72, weight 72.6 kg (160 lb).    Physical Examination:   GEN: no apparent distress, comfortable; AAOx3  SKIN: no rashes,no ulceration, no Raynaud's, no  petechiae, no SQ nodules,  HEAD: normal  EYES: no pallor, no icterus  NECK: no masses, thyroid normal, trachea midline, no LAD/LN's, supple  CV: RRR with no murmur; l S1 and S2 reg. ,no gallop no rubs,   CHEST: Normal respiratory effort; CTAB; normal breath sounds; no wheeze or crackles  MUSC/Skeletal: ROM normal; no crepitus; joints without synovitis,  no deformities  No joint swelling or tenderness of PIP, MCP, wrist, elbow, shoulder, or knee joints.right shoulder without swelling .Abduction to 95   internal/external rotation preserved. Negative droprm  EXTREM: no clubbing, cyanosis, no edema,normal  pulses   NEURO: grossly intact; motor WNL; AAOx3; ,  PSYCH: normal mood, affect and behavior  LYMPH: normal cervical, supraclavicular          Labs:   Lab Results   Component Value Date    WBC 6.6 04/11/2019    HGB 14.8 04/11/2019    HCT 42.0 04/11/2019    MCV 99.5 (H) 04/11/2019     04/11/2019    CMP  @LASTLAB(NA,K,CL,CO2,GLU,BUN,Creatinine,Calcium,PROT,Albumin,Bilitot,Alkphos,AST,ALT,CRP,ESR,RF,CCP,HERNAN,SSA,CPK,uric acid) )@  I have reviewed all available lab results and radiology reports.    Radiology/Diagnostic Studies:        Assessment/Plan:   (1) 48 y.o. female with diagnosis of RA stable  Eye exam due 6 mo ago,therefore Hold Plaquenil until eye exam.Restart if ok  2)RSABursitis..injected as per procedure note  3) Hx + antiphospholipid              Discussion:     I have explained all of the above in detail and the patient understands all of the current recommendation(s). I have answered all questions to the best of my ability and to their complete satisfaction.       The patient is to continue with the current management plan         RTC in   4 months if doing well or a few wks    Electronically signed by Bella Crews MD

## 2019-07-30 NOTE — PROCEDURES
Ruma Nguyen is a 48 y.o. female patient.    BP: 120/72 (07/30/19 1525)  Weight: 72.6 kg (160 lb) (07/30/19 1525)       Arthrocentesis  Date/Time: 7/30/2019 4:11 PM  Performed by: Bella Crews MD  Authorized by: Bella Crews MD   Consent Done: Yes  Consent: Verbal consent obtained.  Indications: pain   Body area: shoulder  Joint: right subacromial bursa  Local anesthesia used: yes    Anesthesia:  Local anesthesia used: yes  Patient sedated: no        Injected 1 cc Kenalog/1cc Dexamethasone Ayala Crews  7/30/2019

## 2019-07-31 LAB — ERYTHROCYTE [SEDIMENTATION RATE] IN BLOOD BY WESTERGREN METHOD: 20 MM/HR (ref 0–20)

## 2019-10-02 DIAGNOSIS — M05.79 RHEUMATOID ARTHRITIS INVOLVING MULTIPLE SITES WITH POSITIVE RHEUMATOID FACTOR: Primary | ICD-10-CM

## 2019-10-03 RX ORDER — TRAMADOL HYDROCHLORIDE 50 MG/1
100 TABLET ORAL EVERY 8 HOURS PRN
Qty: 180 TABLET | Refills: 1 | Status: SHIPPED | OUTPATIENT
Start: 2019-10-03 | End: 2019-11-19 | Stop reason: SDUPTHER

## 2019-11-19 ENCOUNTER — OFFICE VISIT (OUTPATIENT)
Dept: RHEUMATOLOGY | Facility: CLINIC | Age: 49
End: 2019-11-19
Payer: COMMERCIAL

## 2019-11-19 VITALS — DIASTOLIC BLOOD PRESSURE: 84 MMHG | SYSTOLIC BLOOD PRESSURE: 133 MMHG | WEIGHT: 162.5 LBS | BODY MASS INDEX: 27.04 KG/M2

## 2019-11-19 DIAGNOSIS — M05.79 RHEUMATOID ARTHRITIS INVOLVING MULTIPLE SITES WITH POSITIVE RHEUMATOID FACTOR: ICD-10-CM

## 2019-11-19 PROCEDURE — 99213 PR OFFICE/OUTPT VISIT, EST, LEVL III, 20-29 MIN: ICD-10-PCS | Mod: S$GLB,,, | Performed by: INTERNAL MEDICINE

## 2019-11-19 PROCEDURE — 99213 OFFICE O/P EST LOW 20 MIN: CPT | Mod: S$GLB,,, | Performed by: INTERNAL MEDICINE

## 2019-11-19 RX ORDER — TRAMADOL HYDROCHLORIDE 50 MG/1
100 TABLET ORAL EVERY 8 HOURS PRN
Qty: 180 TABLET | Refills: 1 | Status: SHIPPED | OUTPATIENT
Start: 2019-11-19 | End: 2020-02-05

## 2019-11-19 NOTE — PROGRESS NOTES
[facility] RHEUMATOLOGY            PROGRESS NOTE      Subjective:       Patient ID:   NAME: Ruma Nguyen : 1970     48 y.o. female    Referring Doc: No ref. provider found  Other Physicians:    Chief Complaint:  Rheumatoid Arthritis      History of Present Illness:     Patient returns today for a regularly scheduled follow-up visit for   RA    The patient has arthralgias but no joint swelling.  No prolonged morning stiffness.  No chest pains, cough or shortness of breath.  No GI complaints.          ROS:   GEN:  No  fever, night sweats . weight is stable  .  Slight fatigue   SKIN: no rashes, no bruising, no ulcerations, no Raynaud's  HEENT: no HA's, No visual changes, no mucosal ulcers, no sicca symptoms,  CV:   no CP, SOB, PND, MUÑOZ, no orthopnea, no palpitations  PULM: normal with no SOB, cough, hemoptysis, sputum or pleuritic pain  GI:  no abdominal pain, nausea, vomiting, constipation, diarrhea, melanotic stools, BRBPR, hematemesis, no dysphagia  :   no dysuria  NEURO: no paresthesias, headaches, visual disturbances, muscle weakness  MUSCULOSKELETAL:no joint swelling, prolonged AM stiffness, no back pain, + occ muscle pain  Allergies:  Review of patient's allergies indicates:  No Known Allergies    Medications:    Current Outpatient Medications:     aspirin (ECOTRIN) 81 MG EC tablet, Take 81 mg by mouth once daily., Disp: , Rfl:     hydroxychloroquine (PLAQUENIL) 200 mg tablet, Take 1 tablet (200 mg total) by mouth once daily., Disp: 90 tablet, Rfl: 1    traMADol (ULTRAM) 50 mg tablet, Take 2 tablets (100 mg total) by mouth every 8 (eight) hours as needed. Qty medically necessary, Disp: 180 tablet, Rfl: 1    PMHx/PSHx Updates:      Last Eye Exam 14-16 months ago      Objective:     Vitals:  Blood pressure 133/84, weight 73.7 kg (162 lb 8 oz).    Physical Examination:   GEN: no apparent distress, comfortable; AAOx3  SKIN: no rashes,no ulceration, no Raynaud's, no petechiae, no SQ  nodules,  HEAD: normal  EYES: no pallor, no icterus,  NECK: no masses, thyroid normal, trachea midline, no LAD/LN's, supple  CV: RRR with no murmur; l S1 and S2 reg. ,no gallop no rubs,   CHEST: Normal respiratory effort; CTAB; normal breath sounds; no wheeze or crackles  MUSC/Skeletal: ROM normal; no crepitus; joints without synovitis,  no deformities  No joint swelling or tenderness of PIP, MCP, wrist, elbow, shoulder, or knee joints  EXTREM: no clubbing, cyanosis, no edema,normal  pulses   NEURO: grossly intact; motor WNL; AAOx3;   PSYCH: normal mood, affect and behavior  LYMPH: normal cervical, supraclavicular          Labs:   Lab Results   Component Value Date    WBC 5.92 07/30/2019    HGB 14.6 07/30/2019    HCT 41.7 07/30/2019     (H) 07/30/2019     07/30/2019    CMP  @LASTLAB(NA,K,CL,CO2,GLU,BUN,Creatinine,Calcium,PROT,Albumin,Bilitot,Alkphos,AST,ALT,CRP,ESR,RF,CCP,HERNAN,SSA,CPK,uric acid) )@  I have reviewed all available lab results and radiology reports.    Radiology/Diagnostic Studies:        Assessment/Plan:   (1) 48 y.o. female with diagnosis of rheumatoid arthritis.  This is stable  History of positive antiphospholipid antibodies.  Plan:  Patient has to stop Plaquenil until is she has her eyes examined.  She can restart if no Plaquenil toxicity noted.      CBC CMP CRP          Discussion:     I have explained all of the above in detail and the patient understands all of the current recommendation(s). I have answered all questions to the best of my ability and to their complete satisfaction.       The patient is to continue with the current management plan         RTC in   4 months      Electronically signed by Bella Crews MD

## 2019-11-23 RX ORDER — HYDROXYCHLOROQUINE SULFATE 200 MG/1
TABLET, FILM COATED ORAL
Qty: 90 TABLET | Refills: 2 | Status: SHIPPED | OUTPATIENT
Start: 2019-11-23 | End: 2020-06-05 | Stop reason: SDUPTHER

## 2020-02-03 DIAGNOSIS — M05.79 RHEUMATOID ARTHRITIS INVOLVING MULTIPLE SITES WITH POSITIVE RHEUMATOID FACTOR: ICD-10-CM

## 2020-02-05 RX ORDER — TRAMADOL HYDROCHLORIDE 50 MG/1
TABLET ORAL
Qty: 180 TABLET | Refills: 1 | Status: SHIPPED | OUTPATIENT
Start: 2020-02-05 | End: 2020-04-04

## 2020-02-28 PROBLEM — Z86.010 PERSONAL HISTORY OF COLONIC POLYPS: Status: ACTIVE | Noted: 2020-02-28

## 2020-02-28 PROBLEM — Z86.0100 PERSONAL HISTORY OF COLONIC POLYPS: Status: ACTIVE | Noted: 2020-02-28

## 2020-04-03 DIAGNOSIS — M05.79 RHEUMATOID ARTHRITIS INVOLVING MULTIPLE SITES WITH POSITIVE RHEUMATOID FACTOR: ICD-10-CM

## 2020-04-04 RX ORDER — TRAMADOL HYDROCHLORIDE 50 MG/1
TABLET ORAL
Qty: 180 TABLET | Refills: 1 | Status: SHIPPED | OUTPATIENT
Start: 2020-04-04 | End: 2020-06-11 | Stop reason: SDUPTHER

## 2020-06-05 PROBLEM — C44.90 SKIN CANCER: Status: ACTIVE | Noted: 2020-06-05

## 2020-06-10 ENCOUNTER — OFFICE VISIT (OUTPATIENT)
Dept: RHEUMATOLOGY | Facility: CLINIC | Age: 50
End: 2020-06-10
Payer: COMMERCIAL

## 2020-06-10 ENCOUNTER — LAB VISIT (OUTPATIENT)
Dept: LAB | Facility: HOSPITAL | Age: 50
End: 2020-06-10
Attending: INTERNAL MEDICINE
Payer: COMMERCIAL

## 2020-06-10 VITALS
WEIGHT: 160.69 LBS | BODY MASS INDEX: 26.74 KG/M2 | SYSTOLIC BLOOD PRESSURE: 127 MMHG | TEMPERATURE: 98 F | DIASTOLIC BLOOD PRESSURE: 77 MMHG

## 2020-06-10 DIAGNOSIS — M05.79 RHEUMATOID ARTHRITIS INVOLVING MULTIPLE SITES WITH POSITIVE RHEUMATOID FACTOR: ICD-10-CM

## 2020-06-10 DIAGNOSIS — M75.52 BURSITIS OF SHOULDER, LEFT: ICD-10-CM

## 2020-06-10 DIAGNOSIS — R76.0 ANTIPHOSPHOLIPID ANTIBODY POSITIVE: Primary | ICD-10-CM

## 2020-06-10 DIAGNOSIS — R76.0 ANTIPHOSPHOLIPID ANTIBODY POSITIVE: ICD-10-CM

## 2020-06-10 PROCEDURE — 86147 CARDIOLIPIN ANTIBODY EA IG: CPT | Mod: 59

## 2020-06-10 PROCEDURE — 20610 LARGE JOINT ASPIRATION/INJECTION: L SUBACROMIAL BURSA: ICD-10-PCS | Mod: LT,,, | Performed by: INTERNAL MEDICINE

## 2020-06-10 PROCEDURE — 86146 BETA-2 GLYCOPROTEIN ANTIBODY: CPT | Mod: 59

## 2020-06-10 PROCEDURE — 86200 CCP ANTIBODY: CPT

## 2020-06-10 PROCEDURE — 86038 ANTINUCLEAR ANTIBODIES: CPT

## 2020-06-10 PROCEDURE — 30000890 HC MISC. SEND OUT TEST

## 2020-06-10 PROCEDURE — 86235 NUCLEAR ANTIGEN ANTIBODY: CPT | Mod: 59

## 2020-06-10 PROCEDURE — 85670 THROMBIN TIME PLASMA: CPT

## 2020-06-10 PROCEDURE — 36415 COLL VENOUS BLD VENIPUNCTURE: CPT

## 2020-06-10 PROCEDURE — 99213 PR OFFICE/OUTPT VISIT, EST, LEVL III, 20-29 MIN: ICD-10-PCS | Mod: 25,,, | Performed by: INTERNAL MEDICINE

## 2020-06-10 PROCEDURE — 86160 COMPLEMENT ANTIGEN: CPT | Mod: 59

## 2020-06-10 PROCEDURE — 86431 RHEUMATOID FACTOR QUANT: CPT

## 2020-06-10 PROCEDURE — 99213 OFFICE O/P EST LOW 20 MIN: CPT | Mod: 25,,, | Performed by: INTERNAL MEDICINE

## 2020-06-10 PROCEDURE — 20610 DRAIN/INJ JOINT/BURSA W/O US: CPT | Mod: LT,,, | Performed by: INTERNAL MEDICINE

## 2020-06-10 PROCEDURE — 85613 RUSSELL VIPER VENOM DILUTED: CPT

## 2020-06-10 PROCEDURE — 85705 THROMBOPLASTIN INHIBITION: CPT

## 2020-06-10 PROCEDURE — 86160 COMPLEMENT ANTIGEN: CPT

## 2020-06-10 PROCEDURE — 85732 THROMBOPLASTIN TIME PARTIAL: CPT

## 2020-06-10 NOTE — PROCEDURES
Large Joint Aspiration/Injection: L subacromial bursa  Date/Time: 6/10/2020 2:45 PM  Performed by: Bella Crews MD  Authorized by: Bella Crews MD     Local anesthetic:  Lidocaine 2% without epinephrine  Location:  Shoulder  Site:  L subacromial bursa     Injected 1 cc Kenalog 1 cc dexamethasone left subacromial bursa

## 2020-06-10 NOTE — PROGRESS NOTES
[facility] RHEUMATOLOGY            PROGRESS NOTE      Subjective:       Patient ID:   NAME: Ruma Nguyen : 1970     49 y.o. female    Referring Doc: No ref. provider found  Other Physicians:    Chief Complaint:  Rheumatoid Arthritis      History of Present Illness:     Patient returns today for a regularly scheduled follow-up visit for  rheumatoid arthritis      The patient was off Plaquenil for a few months due to not having had an eye exam.  She had an eye exam and restarted Plaquenil the last month or so.  Had episode of amaurosis fugax.  She was seen by retinal specialist  Who recommended echo and cardiac workup to rule out carotid blockages.  No paresthesias.  No chest pains cough or shortness of breath.  No fevers.  Slight fatigue.  Pain in both shoulders left worse than right mainly with abduction.  No history of trauma.          ROS: + fatigue  SKIN: no rashes, no bruising, no ulcerations, no Raynaud's  HEENT: no HA's, No visual changes, no mucosal ulcers, no sicca symptoms,  CV:   no CP, SOB, PND, MUÑOZ, no orthopnea, no palpitations  PULM: normal with no SOB, cough, hemoptysis, sputum or pleuritic pain  GI:  no abdominal pain, nausea, vomiting, constipation, diarrhea, melanotic stools, BRBPR, hematemesis, no dysphagia  :   no dysuria  NEURO: no paresthesias, headaches, visual disturbances, muscle weakness  MUSCULOSKELETAL:no joint swelling, prolonged AM stiffness, no back pain, no muscle pain  Allergies:  Review of patient's allergies indicates:  No Known Allergies    Medications:    Current Outpatient Medications:     aspirin (ECOTRIN) 81 MG EC tablet, Take 81 mg by mouth once daily., Disp: , Rfl:     diclofenac (VOLTAREN) 50 MG EC tablet, Take 50 mg by mouth 2 (two) times daily., Disp: , Rfl:     diclofenac sodium (VOLTAREN) 1 % Gel, Apply 2 g topically 4 (four) times daily., Disp: , Rfl:     hydroxychloroquine (PLAQUENIL) 200 mg tablet, Take 1 tablet (200 mg total) by mouth once  daily., Disp: 90 tablet, Rfl: 1    traMADoL (ULTRAM) 50 mg tablet, TAKE 2 TABLETS (100 MG TOTAL) BY MOUTH EVERY 8 (EIGHT) HOURS AS NEEDED., Disp: 180 tablet, Rfl: 1    PMHx/PSHx Updates:        Last Eye Exam UTD      Objective:     Vitals:  Blood pressure 127/77, temperature 98.2 °F (36.8 °C), weight 72.9 kg (160 lb 11.2 oz).    Physical Examination:   GEN: no apparent distress, comfortable; AAOx3  SKIN: no rashes,no ulceration, no Raynaud's, no petechiae, no SQ nodules,  HEAD: normal  EYES: no pallor, no icterus,  NECK: no masses, thyroid normal, trachea midline, no LAD/LN's, supple  CV: RRR with no murmur; l S1 and S2 reg. ,no gallop no rubs,   CHEST: Normal respiratory effort; CTAB; normal breath sounds; no wheeze or crackles  MUSC/Skeletal: ROM normal; no crepitus; joints without synovitis,  no deformities  No joint swelling or tenderness of PIP, MCP, wrist, elbow, shoulder, or knee joints.  Left shoulder abduction to 90° internal external rotation preserved.  Negative drop-arm  EXTREM: no clubbing, cyanosis, no edema,normal  pulses   NEURO: grossly intact; motor WNL; AAOx3;   PSYCH: normal mood, affect and behavior  LYMPH: normal cervical, supraclavicular          Labs:   Lab Results   Component Value Date    WBC 6.15 06/05/2020    HGB 14.3 06/05/2020    HCT 40.8 06/05/2020     (H) 06/05/2020     06/05/2020    CMP  @LASTLAB(NA,K,CL,CO2,GLU,BUN,Creatinine,Calcium,PROT,Albumin,Bilitot,Alkphos,AST,ALT,CRP,ESR,RF,CCP,HERNAN,SSA,CPK,uric acid) )@  I have reviewed all available lab results and radiology reports.    Radiology/Diagnostic Studies:        Assessment/Plan:   (1) 49 y.o. female with diagnosis of rheumatoid arthritis.  This is stable  History of antiphospholipid antibodies.  She is on a baby aspirin.  She is to increase to full aspirin a day for now.  Recent history of amaurosis fugax  Left subacromial bursitis.  Injected as per procedure note        Reviewed recent CBC CMP inflammatory markers.   There are normal.  Will recheck antiphospholipids, HERNAN/ SSA, rheumatoid factor, CCP, urinalysis    Follow-up in 3 weeks        Discussion:     I have explained all of the above in detail and the patient understands all of the current recommendation(s). I have answered all questions to the best of my ability and to their complete satisfaction.       The patient is to continue with the current management plan         RTC in         Electronically signed by Bella Crews MD

## 2020-06-11 DIAGNOSIS — M05.79 RHEUMATOID ARTHRITIS INVOLVING MULTIPLE SITES WITH POSITIVE RHEUMATOID FACTOR: Primary | ICD-10-CM

## 2020-06-12 LAB
ANA PANEL SEE BELOW:: NORMAL
ANA TITR SER IF: NEGATIVE {TITER}
C3 SERPL-MCNC: 100 MG/DL (ref 82–167)
C4 SERPL-MCNC: 5 MG/DL (ref 14–44)
CENTROMERE B AB SER-ACNC: <0.2 AI (ref 0–0.9)
CHROMATIN AB SERPL-ACNC: <0.2 AI (ref 0–0.9)
DSDNA AB SER-ACNC: <1 IU/ML (ref 0–9)
ENA JO1 AB SER-ACNC: <0.2 AI (ref 0–0.9)
ENA RNP AB SER-ACNC: <0.2 AI (ref 0–0.9)
ENA SCL70 AB SER-ACNC: <0.2 AI (ref 0–0.9)
ENA SM AB SER-ACNC: <0.2 AI (ref 0–0.9)
ENA SS-A AB SER-ACNC: 0.2 AI (ref 0–0.9)
ENA SS-B AB SER-ACNC: <0.2 AI (ref 0–0.9)
RHEUMATOID FACT SERPL-ACNC: 12.7 IU/ML (ref 0–13.9)

## 2020-06-13 LAB
B2 GLYCOPROT1 IGA SER-ACNC: 60 GPI IGA UNITS (ref 0–25)
B2 GLYCOPROT1 IGG SER-ACNC: <9 GPI IGG UNITS (ref 0–20)
B2 GLYCOPROT1 IGM SER-ACNC: 140 GPI IGM UNITS (ref 0–32)
CCP IGA+IGG SERPL IA-ACNC: 9 UNITS (ref 0–19)
LABCORP MISC TEST CODE: NORMAL
LABCORP MISC TEST NAME: NORMAL
LABCORP MISCELLANEOUS TEST: NORMAL

## 2020-06-15 RX ORDER — TRAMADOL HYDROCHLORIDE 50 MG/1
TABLET ORAL
Qty: 180 TABLET | Refills: 1 | Status: SHIPPED | OUTPATIENT
Start: 2020-06-15 | End: 2020-07-02

## 2020-06-15 RX ORDER — HYDROXYCHLOROQUINE SULFATE 200 MG/1
200 TABLET, FILM COATED ORAL DAILY
Qty: 90 TABLET | Refills: 1 | Status: SHIPPED | OUTPATIENT
Start: 2020-06-15 | End: 2020-11-11

## 2020-06-17 LAB
CARDIOLIPIN IGA SER IA-ACNC: 106 MPL U/ML (ref 0–12)
CARDIOLIPIN IGG SER IA-ACNC: 25 GPL U/ML (ref 0–14)
CARDIOLIPIN IGM SER IA-ACNC: 23 APL U/ML (ref 0–11)

## 2020-06-24 ENCOUNTER — OFFICE VISIT (OUTPATIENT)
Dept: CARDIOLOGY | Facility: CLINIC | Age: 50
End: 2020-06-24
Payer: COMMERCIAL

## 2020-06-24 VITALS
SYSTOLIC BLOOD PRESSURE: 138 MMHG | WEIGHT: 159.38 LBS | BODY MASS INDEX: 26.55 KG/M2 | HEART RATE: 76 BPM | HEIGHT: 65 IN | DIASTOLIC BLOOD PRESSURE: 72 MMHG

## 2020-06-24 DIAGNOSIS — F17.200 SMOKER: ICD-10-CM

## 2020-06-24 DIAGNOSIS — I35.1 AORTIC VALVE INSUFFICIENCY, ETIOLOGY OF CARDIAC VALVE DISEASE UNSPECIFIED: ICD-10-CM

## 2020-06-24 DIAGNOSIS — M06.09 RHEUMATOID ARTHRITIS OF MULTIPLE SITES WITHOUT RHEUMATOID FACTOR: ICD-10-CM

## 2020-06-24 DIAGNOSIS — D68.61 ANTIPHOSPHOLIPID SYNDROME: ICD-10-CM

## 2020-06-24 PROCEDURE — 99999 PR PBB SHADOW E&M-EST. PATIENT-LVL III: ICD-10-PCS | Mod: PBBFAC,,, | Performed by: INTERNAL MEDICINE

## 2020-06-24 PROCEDURE — 99204 PR OFFICE/OUTPT VISIT, NEW, LEVL IV, 45-59 MIN: ICD-10-PCS | Mod: S$GLB,,, | Performed by: INTERNAL MEDICINE

## 2020-06-24 PROCEDURE — 99204 OFFICE O/P NEW MOD 45 MIN: CPT | Mod: S$GLB,,, | Performed by: INTERNAL MEDICINE

## 2020-06-24 PROCEDURE — 99999 PR PBB SHADOW E&M-EST. PATIENT-LVL III: CPT | Mod: PBBFAC,,, | Performed by: INTERNAL MEDICINE

## 2020-06-24 NOTE — PROGRESS NOTES
Subjective:    Patient ID:  Ruma Nguyen is a 49 y.o. female who presents for evaluation of Heart Murmur (abnormal echo)      Pt has a history of a heart murmur as a child and had a recent Echo done as part of a w/u for a recent amaurosis fugax event. Echo showed mild/moderate aortic insufficiency with normal valve structure, normal chamber dimensions and otherwise normal Echo. She had normal Carotid US. She reports her mother had aortic valve replacement and has a niece with aortic valve issue. She reports no new symptoms other than her arthritis issues.      Review of Systems   Constitution: Negative for weight gain and weight loss.   HENT: Positive for hearing loss.    Eyes: Negative.    Cardiovascular: Negative for chest pain, claudication, cyanosis, dyspnea on exertion, irregular heartbeat, leg swelling, near-syncope, orthopnea (no PND), palpitations and syncope.   Respiratory: Negative for cough, hemoptysis, shortness of breath and snoring.    Endocrine: Negative.    Skin: Negative.    Musculoskeletal: Positive for arthritis and joint pain. Negative for muscle cramps, muscle weakness and myalgias.   Gastrointestinal: Negative for diarrhea, hematemesis, nausea and vomiting.   Genitourinary: Negative.    Neurological: Negative for dizziness, focal weakness, light-headedness, loss of balance, numbness, paresthesias and seizures.        Amaurosis fugax   Psychiatric/Behavioral: Negative.         Objective:    Physical Exam   Constitutional: She is oriented to person, place, and time. She appears well-developed and well-nourished.   Eyes: Pupils are equal, round, and reactive to light.   Neck: Normal range of motion. No thyromegaly present.   Cardiovascular: Normal rate, regular rhythm, S1 normal, S2 normal, normal heart sounds, intact distal pulses and normal pulses.  No extrasystoles are present. PMI is not displaced. Exam reveals no friction rub.   No murmur heard.  Pulmonary/Chest: Effort normal and breath  sounds normal. She has no wheezes. She has no rales. She exhibits no tenderness.   Abdominal: Soft. Bowel sounds are normal. She exhibits no distension and no mass. There is no abdominal tenderness.   Musculoskeletal: Normal range of motion.         General: No edema.   Neurological: She is alert and oriented to person, place, and time.   Skin: Skin is warm and dry.   Vitals reviewed.      Test(s) Reviewed  I have reviewed the following in detail:  [] Stress test   [] Angiography   [x] Echocardiogram   [x] Labs   [x] Other:  Carotid US; ECG       Assessment:       1. Aortic valve insufficiency, etiology of cardiac valve disease unspecified    2. Antiphospholipid syndrome    3. Rheumatoid arthritis of multiple sites without rheumatoid factor    4. Smoker         Plan:       We discussed her mild/moderate aortic insufficiency.   No described structural abnormality of the AV  Based on the Echo report it does not appear related to her recent episode of transient monocular blindness  For now can follow with yearly Echocardiograms  We discussed quitting smoking as risk reduction measure

## 2020-06-24 NOTE — LETTER
June 24, 2020      Wander Solano MD  [id number] [facility] [address]  [facility] [address]           [facility]  [address] [place] [address]  Phone: [phone]          Patient: Ruma Nguyen   MR Number: [id number]   YOB: 1970   Date of Visit: 6/24/2020       Dear Dr. Wander Solano:    Thank you for referring Ruma Nguyen to me for evaluation. Attached you will find relevant portions of my assessment and plan of care.    If you have questions, please do not hesitate to call me. I look forward to following Ruma Nguyen along with you.    Sincerely,    Aldo Johnston MD    Enclosure  CC:  No Recipients    If you would like to receive this communication electronically, please contact [email or web address] or [phone] to request more information on [employer] access.    For providers and/or their staff who would like to refer a patient to [facility], please contact us through our one-stop-shop provider referral line, Ara Jones, at [phone].    If you feel you have received this communication in error or would no longer like to receive these types of communications, please e-mail [email or web address]

## 2020-07-02 ENCOUNTER — OFFICE VISIT (OUTPATIENT)
Dept: RHEUMATOLOGY | Facility: CLINIC | Age: 50
End: 2020-07-02
Payer: COMMERCIAL

## 2020-07-02 VITALS
TEMPERATURE: 98 F | BODY MASS INDEX: 26.34 KG/M2 | DIASTOLIC BLOOD PRESSURE: 75 MMHG | WEIGHT: 158.31 LBS | SYSTOLIC BLOOD PRESSURE: 130 MMHG

## 2020-07-02 DIAGNOSIS — M75.51 BURSITIS OF SHOULDER, RIGHT: Primary | ICD-10-CM

## 2020-07-02 DIAGNOSIS — M05.79 RHEUMATOID ARTHRITIS INVOLVING MULTIPLE SITES WITH POSITIVE RHEUMATOID FACTOR: ICD-10-CM

## 2020-07-02 PROCEDURE — 20610 DRAIN/INJ JOINT/BURSA W/O US: CPT | Mod: RT,,, | Performed by: INTERNAL MEDICINE

## 2020-07-02 PROCEDURE — 99213 PR OFFICE/OUTPT VISIT, EST, LEVL III, 20-29 MIN: ICD-10-PCS | Mod: 25,,, | Performed by: INTERNAL MEDICINE

## 2020-07-02 PROCEDURE — 99213 OFFICE O/P EST LOW 20 MIN: CPT | Mod: 25,,, | Performed by: INTERNAL MEDICINE

## 2020-07-02 PROCEDURE — 20610 LARGE JOINT ASPIRATION/INJECTION: R SUBACROMIAL BURSA: ICD-10-PCS | Mod: RT,,, | Performed by: INTERNAL MEDICINE

## 2020-07-02 RX ORDER — TRAMADOL HYDROCHLORIDE 50 MG/1
TABLET ORAL
Qty: 180 TABLET | Refills: 2 | Status: SHIPPED | OUTPATIENT
Start: 2020-07-02 | End: 2020-11-11

## 2020-07-02 NOTE — PROCEDURES
Large Joint Aspiration/Injection: R subacromial bursa    Date/Time: 7/2/2020 3:45 PM  Performed by: Bella Crews MD  Authorized by: Bella Crews MD     Consent Done?:  Yes (Verbal)  Indications:  Pain  Local anesthetic:  Lidocaine 2% without epinephrine  Location:  Shoulder  Site:  R subacromial bursa     Injected 1 cc Kenalog 1 cc dexamethasone to right subacromial bursa

## 2020-07-02 NOTE — PROGRESS NOTES
[facility] RHEUMATOLOGY            PROGRESS NOTE      Subjective:       Patient ID:   NAME: Ruma Nguyen : 1970     49 y.o. female    Referring Doc: No ref. provider found  Other Physicians:    Chief Complaint:      History of Present Illness:     Patient returns today for a regularly scheduled follow-up visit for RA , shoulder bursitis and history of+ntiphospholipid      The patient is doing well except for pain in the right shoulder left shoulder is doing well after injection on her last visit.  No ophthalmologic complaints.  No paresthesias.  No rashes cough shortness of breath or fevers.  No chest pains.            ROS:   GEN:  No  fever, night sweats . weight is stable  + sl fatigue  SKIN: no rashes, no bruising, no ulcerations, no Raynaud's  HEENT: no HA's, No visual changes, no mucosal ulcers, no sicca symptoms,  CV:   no CP, SOB, PND, MUÑOZ, no orthopnea, no palpitations  PULM: normal with no SOB, cough, hemoptysis, sputum or pleuritic pain  GI:  no abdominal pain, nausea, vomiting, constipation, diarrhea, melanotic stools, BRBPR, hematemesis, no dysphagia  :   no dysuria  NEURO: no paresthesias, headaches, visual disturbances, muscle weakness  MUSCULOSKELETAL:no joint swelling, prolonged AM stiffness, no back pain, no muscle pain  Allergies:  Review of patient's allergies indicates:  No Known Allergies    Medications:    Current Outpatient Medications:     aspirin (ECOTRIN) 81 MG EC tablet, Take 325 mg by mouth once daily. , Disp: , Rfl:     diclofenac (VOLTAREN) 50 MG EC tablet, Take 50 mg by mouth 2 (two) times daily., Disp: , Rfl:     diclofenac sodium (VOLTAREN) 1 % Gel, Apply 2 g topically 4 (four) times daily., Disp: , Rfl:     hydroxychloroquine (PLAQUENIL) 200 mg tablet, Take 1 tablet (200 mg total) by mouth once daily. (Patient taking differently: Take 200 mg by mouth 2 (two) times daily. ), Disp: 90 tablet, Rfl: 1    traMADoL (ULTRAM) 50 mg tablet, TAKE 2 TABLETS (100 MG TOTAL)  BY MOUTH EVERY 8 (EIGHT) HOURS AS NEEDED., Disp: 180 tablet, Rfl: 2    PMHx/PSHx Updates:      Last Eye Exam UTD      Objective:     Vitals:  Blood pressure 130/75, temperature 98.2 °F (36.8 °C), weight 71.8 kg (158 lb 4.8 oz).    Physical Examination:   GEN: no apparent distress, comfortable; AAOx3  SKIN: no rashes,no ulceration, no Raynaud's, no petechiae, no SQ nodules,  HEAD: normal  EYES: no pallor, no icterus,   NECK: no masses, thyroid normal, trachea midline, no LAD/LN's, supple  CV: RRR with no murmur; l S1 and S2 reg. ,no gallop no rubs,   CHEST: Normal respiratory effort; CTAB; normal breath sounds; no wheeze or crackles  MUSC/Skeletal: ROM normal; no crepitus; joints without synovitis,  no deformities  No joint swelling or tenderness of PIP, MCP, wrist, elbow, shoulder, or knee joints.  Right shoulder without swelling.  No erythema.  Abduction up to 100.  Internal external rotation preserved.  EXTREM: no clubbing, cyanosis, no edema,normal  pulses   NEURO: grossly intact; motor WNL; AAOx3;   PSYCH: normal mood, affect and behavior  LYMPH: normal cervical, supraclavicular          Labs:   Lab Results   Component Value Date    WBC 6.15 06/05/2020    HGB 14.3 06/05/2020    HCT 40.8 06/05/2020     (H) 06/05/2020     06/05/2020    CMP  @LASTLAB(NA,K,CL,CO2,GLU,BUN,Creatinine,Calcium,PROT,Albumin,Bilitot,Alkphos,AST,ALT,CRP,ESR,RF,CCP,HERNAN,SSA,CPK,uric acid) )@  I have reviewed all available lab results and radiology reports.    Radiology/Diagnostic Studies:        Assessment/Plan:   (1) 49 y.o. female with diagnosis of positive antiphospholipid: + B  2 glycoprotein and  +  Cardiolipin.No ophthalmologic complaints since LOV  She is on full aspirin a day.  Right subacromial bursitis.  Injected as per procedure note  Follow-up in 3 months or before if needed          Discussion:     I have explained all of the above in detail and the patient understands all of the current recommendation(s). I have  answered all questions to the best of my ability and to their complete satisfaction.       The patient is to continue with the current management plan         RTC in   3 months      Electronically signed by Bella Crews MD

## 2020-11-18 ENCOUNTER — OFFICE VISIT (OUTPATIENT)
Dept: RHEUMATOLOGY | Facility: CLINIC | Age: 50
End: 2020-11-18
Payer: COMMERCIAL

## 2020-11-18 VITALS
BODY MASS INDEX: 27.89 KG/M2 | SYSTOLIC BLOOD PRESSURE: 128 MMHG | TEMPERATURE: 98 F | DIASTOLIC BLOOD PRESSURE: 76 MMHG | WEIGHT: 167.63 LBS

## 2020-11-18 DIAGNOSIS — M05.79 RHEUMATOID ARTHRITIS INVOLVING MULTIPLE SITES WITH POSITIVE RHEUMATOID FACTOR: ICD-10-CM

## 2020-11-18 PROCEDURE — 99213 PR OFFICE/OUTPT VISIT, EST, LEVL III, 20-29 MIN: ICD-10-PCS | Mod: S$GLB,,, | Performed by: INTERNAL MEDICINE

## 2020-11-18 PROCEDURE — 99213 OFFICE O/P EST LOW 20 MIN: CPT | Mod: S$GLB,,, | Performed by: INTERNAL MEDICINE

## 2020-11-18 RX ORDER — TRAMADOL HYDROCHLORIDE 50 MG/1
TABLET ORAL
Qty: 180 TABLET | Refills: 2 | Status: SHIPPED | OUTPATIENT
Start: 2020-11-18 | End: 2021-02-22

## 2020-11-18 RX ORDER — HYDROXYCHLOROQUINE SULFATE 200 MG/1
200 TABLET, FILM COATED ORAL 2 TIMES DAILY
Qty: 180 TABLET | Refills: 1 | Status: SHIPPED | OUTPATIENT
Start: 2020-11-18 | End: 2021-09-16 | Stop reason: SDUPTHER

## 2020-11-18 RX ORDER — DICLOFENAC SODIUM 50 MG/1
50 TABLET, DELAYED RELEASE ORAL 2 TIMES DAILY PRN
Qty: 180 TABLET | Refills: 1 | Status: SHIPPED | OUTPATIENT
Start: 2020-11-18 | End: 2021-03-31

## 2021-04-20 ENCOUNTER — OFFICE VISIT (OUTPATIENT)
Dept: RHEUMATOLOGY | Facility: CLINIC | Age: 51
End: 2021-04-20
Payer: COMMERCIAL

## 2021-04-20 VITALS
WEIGHT: 164.81 LBS | BODY MASS INDEX: 27.42 KG/M2 | DIASTOLIC BLOOD PRESSURE: 71 MMHG | SYSTOLIC BLOOD PRESSURE: 120 MMHG

## 2021-04-20 DIAGNOSIS — M05.79 RHEUMATOID ARTHRITIS INVOLVING MULTIPLE SITES WITH POSITIVE RHEUMATOID FACTOR: Primary | ICD-10-CM

## 2021-04-20 DIAGNOSIS — R76.0 ANTIPHOSPHOLIPID ANTIBODY POSITIVE: ICD-10-CM

## 2021-04-20 PROCEDURE — 99213 PR OFFICE/OUTPT VISIT, EST, LEVL III, 20-29 MIN: ICD-10-PCS | Mod: S$GLB,,, | Performed by: INTERNAL MEDICINE

## 2021-04-20 PROCEDURE — 99213 OFFICE O/P EST LOW 20 MIN: CPT | Mod: S$GLB,,, | Performed by: INTERNAL MEDICINE

## 2021-08-24 ENCOUNTER — OFFICE VISIT (OUTPATIENT)
Dept: CARDIOLOGY | Facility: CLINIC | Age: 51
End: 2021-08-24
Payer: COMMERCIAL

## 2021-08-24 VITALS
WEIGHT: 159.81 LBS | DIASTOLIC BLOOD PRESSURE: 68 MMHG | BODY MASS INDEX: 26.63 KG/M2 | HEIGHT: 65 IN | HEART RATE: 80 BPM | SYSTOLIC BLOOD PRESSURE: 125 MMHG | OXYGEN SATURATION: 98 %

## 2021-08-24 DIAGNOSIS — I35.1 AORTIC VALVE INSUFFICIENCY, ETIOLOGY OF CARDIAC VALVE DISEASE UNSPECIFIED: ICD-10-CM

## 2021-08-24 DIAGNOSIS — F17.200 SMOKER: ICD-10-CM

## 2021-08-24 DIAGNOSIS — M06.09 RHEUMATOID ARTHRITIS OF MULTIPLE SITES WITHOUT RHEUMATOID FACTOR: ICD-10-CM

## 2021-08-24 DIAGNOSIS — D68.61 ANTIPHOSPHOLIPID SYNDROME: ICD-10-CM

## 2021-08-24 PROCEDURE — 99999 PR PBB SHADOW E&M-EST. PATIENT-LVL III: ICD-10-PCS | Mod: PBBFAC,,, | Performed by: INTERNAL MEDICINE

## 2021-08-24 PROCEDURE — 99999 PR PBB SHADOW E&M-EST. PATIENT-LVL III: CPT | Mod: PBBFAC,,, | Performed by: INTERNAL MEDICINE

## 2021-08-24 PROCEDURE — 99214 OFFICE O/P EST MOD 30 MIN: CPT | Mod: S$GLB,,, | Performed by: INTERNAL MEDICINE

## 2021-08-24 PROCEDURE — 99214 PR OFFICE/OUTPT VISIT, EST, LEVL IV, 30-39 MIN: ICD-10-PCS | Mod: S$GLB,,, | Performed by: INTERNAL MEDICINE

## 2021-09-08 ENCOUNTER — CLINICAL SUPPORT (OUTPATIENT)
Dept: CARDIOLOGY | Facility: HOSPITAL | Age: 51
End: 2021-09-08
Attending: INTERNAL MEDICINE
Payer: COMMERCIAL

## 2021-09-08 VITALS — BODY MASS INDEX: 26.49 KG/M2 | WEIGHT: 159 LBS | HEIGHT: 65 IN

## 2021-09-08 DIAGNOSIS — I35.1 AORTIC VALVE INSUFFICIENCY, ETIOLOGY OF CARDIAC VALVE DISEASE UNSPECIFIED: ICD-10-CM

## 2021-09-08 DIAGNOSIS — F17.200 SMOKER: ICD-10-CM

## 2021-09-08 DIAGNOSIS — M06.09 RHEUMATOID ARTHRITIS OF MULTIPLE SITES WITHOUT RHEUMATOID FACTOR: ICD-10-CM

## 2021-09-08 DIAGNOSIS — D68.61 ANTIPHOSPHOLIPID SYNDROME: ICD-10-CM

## 2021-09-08 PROCEDURE — 93306 TTE W/DOPPLER COMPLETE: CPT | Mod: PO

## 2021-09-08 PROCEDURE — 93306 ECHO (CUPID ONLY): ICD-10-PCS | Mod: 26,,, | Performed by: INTERNAL MEDICINE

## 2021-09-08 PROCEDURE — 93306 TTE W/DOPPLER COMPLETE: CPT | Mod: 26,,, | Performed by: INTERNAL MEDICINE

## 2021-09-09 LAB
AV INDEX (PROSTH): 0.74
AV MEAN GRADIENT: 11 MMHG
AV PEAK GRADIENT: 19 MMHG
AV VALVE AREA: 2.23 CM2
AV VELOCITY RATIO: 0.63
BSA FOR ECHO PROCEDURE: 1.82 M2
CV ECHO LV RWT: 0.3 CM
DOP CALC AO PEAK VEL: 2.19 M/S
DOP CALC AO VTI: 46.99 CM
DOP CALC LVOT AREA: 3 CM2
DOP CALC LVOT DIAMETER: 1.96 CM
DOP CALC LVOT PEAK VEL: 1.39 M/S
DOP CALC LVOT STROKE VOLUME: 104.76 CM3
DOP CALCLVOT PEAK VEL VTI: 34.74 CM
E WAVE DECELERATION TIME: 185.18 MSEC
E/A RATIO: 1.42
E/E' RATIO: 10.29 M/S
ECHO LV POSTERIOR WALL: 0.77 CM (ref 0.6–1.1)
EJECTION FRACTION: 60 %
FRACTIONAL SHORTENING: 34 % (ref 28–44)
INTERVENTRICULAR SEPTUM: 0.71 CM (ref 0.6–1.1)
LA MAJOR: 4.61 CM
LA MINOR: 4.33 CM
LA WIDTH: 3.67 CM
LEFT ATRIUM SIZE: 2.97 CM
LEFT ATRIUM VOLUME INDEX: 23.1 ML/M2
LEFT ATRIUM VOLUME: 41.37 CM3
LEFT INTERNAL DIMENSION IN SYSTOLE: 3.4 CM (ref 2.1–4)
LEFT VENTRICLE DIASTOLIC VOLUME INDEX: 71.88 ML/M2
LEFT VENTRICLE DIASTOLIC VOLUME: 128.67 ML
LEFT VENTRICLE MASS INDEX: 73 G/M2
LEFT VENTRICLE SYSTOLIC VOLUME INDEX: 26.4 ML/M2
LEFT VENTRICLE SYSTOLIC VOLUME: 47.34 ML
LEFT VENTRICULAR INTERNAL DIMENSION IN DIASTOLE: 5.19 CM (ref 3.5–6)
LEFT VENTRICULAR MASS: 131.18 G
LV LATERAL E/E' RATIO: 9.82 M/S
LV SEPTAL E/E' RATIO: 10.8 M/S
MV A" WAVE DURATION": 7.42 MSEC
MV PEAK A VEL: 0.76 M/S
MV PEAK E VEL: 1.08 M/S
MV STENOSIS PRESSURE HALF TIME: 53.7 MS
MV VALVE AREA P 1/2 METHOD: 4.1 CM2
PISA TR MAX VEL: 2.53 M/S
PULM VEIN S/D RATIO: 0.88
PV PEAK D VEL: 0.6 M/S
PV PEAK S VEL: 0.53 M/S
RA MAJOR: 4.05 CM
RA PRESSURE: 3 MMHG
RA WIDTH: 3.35 CM
RIGHT VENTRICULAR END-DIASTOLIC DIMENSION: 2.49 CM
RV TISSUE DOPPLER FREE WALL SYSTOLIC VELOCITY 1 (APICAL 4 CHAMBER VIEW): 13.62 CM/S
SINUS: 2.45 CM
STJ: 2.22 CM
TDI LATERAL: 0.11 M/S
TDI SEPTAL: 0.1 M/S
TDI: 0.11 M/S
TR MAX PG: 26 MMHG
TRICUSPID ANNULAR PLANE SYSTOLIC EXCURSION: 2.49 CM
TV REST PULMONARY ARTERY PRESSURE: 29 MMHG

## 2021-09-16 ENCOUNTER — OFFICE VISIT (OUTPATIENT)
Dept: RHEUMATOLOGY | Facility: CLINIC | Age: 51
End: 2021-09-16
Payer: COMMERCIAL

## 2021-09-16 VITALS
BODY MASS INDEX: 27.34 KG/M2 | DIASTOLIC BLOOD PRESSURE: 73 MMHG | WEIGHT: 164.31 LBS | SYSTOLIC BLOOD PRESSURE: 129 MMHG

## 2021-09-16 DIAGNOSIS — M05.79 RHEUMATOID ARTHRITIS INVOLVING MULTIPLE SITES WITH POSITIVE RHEUMATOID FACTOR: ICD-10-CM

## 2021-09-16 PROCEDURE — 99213 OFFICE O/P EST LOW 20 MIN: CPT | Mod: S$GLB,,, | Performed by: INTERNAL MEDICINE

## 2021-09-16 PROCEDURE — 99213 PR OFFICE/OUTPT VISIT, EST, LEVL III, 20-29 MIN: ICD-10-PCS | Mod: S$GLB,,, | Performed by: INTERNAL MEDICINE

## 2021-09-16 RX ORDER — LINACLOTIDE 145 UG/1
145 CAPSULE, GELATIN COATED ORAL EVERY MORNING
COMMUNITY
Start: 2021-08-05 | End: 2022-02-02

## 2021-09-16 RX ORDER — HYDROXYCHLOROQUINE SULFATE 200 MG/1
200 TABLET, FILM COATED ORAL 2 TIMES DAILY
Qty: 180 TABLET | Refills: 1 | Status: SHIPPED | OUTPATIENT
Start: 2021-09-16 | End: 2021-12-23 | Stop reason: SDUPTHER

## 2021-11-17 ENCOUNTER — TELEPHONE (OUTPATIENT)
Dept: RHEUMATOLOGY | Facility: CLINIC | Age: 51
End: 2021-11-17
Payer: COMMERCIAL

## 2021-12-23 ENCOUNTER — OFFICE VISIT (OUTPATIENT)
Dept: RHEUMATOLOGY | Facility: CLINIC | Age: 51
End: 2021-12-23
Payer: COMMERCIAL

## 2021-12-23 VITALS
BODY MASS INDEX: 26.81 KG/M2 | DIASTOLIC BLOOD PRESSURE: 78 MMHG | SYSTOLIC BLOOD PRESSURE: 131 MMHG | WEIGHT: 161.13 LBS

## 2021-12-23 DIAGNOSIS — M05.79 RHEUMATOID ARTHRITIS INVOLVING MULTIPLE SITES WITH POSITIVE RHEUMATOID FACTOR: ICD-10-CM

## 2021-12-23 PROCEDURE — 99213 OFFICE O/P EST LOW 20 MIN: CPT | Mod: S$GLB,,, | Performed by: INTERNAL MEDICINE

## 2021-12-23 PROCEDURE — 3078F PR MOST RECENT DIASTOLIC BLOOD PRESSURE < 80 MM HG: ICD-10-PCS | Mod: S$GLB,,, | Performed by: INTERNAL MEDICINE

## 2021-12-23 PROCEDURE — 3075F SYST BP GE 130 - 139MM HG: CPT | Mod: S$GLB,,, | Performed by: INTERNAL MEDICINE

## 2021-12-23 PROCEDURE — 3078F DIAST BP <80 MM HG: CPT | Mod: S$GLB,,, | Performed by: INTERNAL MEDICINE

## 2021-12-23 PROCEDURE — 99213 PR OFFICE/OUTPT VISIT, EST, LEVL III, 20-29 MIN: ICD-10-PCS | Mod: S$GLB,,, | Performed by: INTERNAL MEDICINE

## 2021-12-23 PROCEDURE — 3008F BODY MASS INDEX DOCD: CPT | Mod: S$GLB,,, | Performed by: INTERNAL MEDICINE

## 2021-12-23 PROCEDURE — 3075F PR MOST RECENT SYSTOLIC BLOOD PRESS GE 130-139MM HG: ICD-10-PCS | Mod: S$GLB,,, | Performed by: INTERNAL MEDICINE

## 2021-12-23 PROCEDURE — 3008F PR BODY MASS INDEX (BMI) DOCUMENTED: ICD-10-PCS | Mod: S$GLB,,, | Performed by: INTERNAL MEDICINE

## 2021-12-23 RX ORDER — HYDROXYCHLOROQUINE SULFATE 200 MG/1
200 TABLET, FILM COATED ORAL 2 TIMES DAILY
Qty: 180 TABLET | Refills: 1 | Status: SHIPPED | OUTPATIENT
Start: 2021-12-23 | End: 2022-05-02 | Stop reason: SDUPTHER

## 2021-12-23 RX ORDER — TRAMADOL HYDROCHLORIDE 50 MG/1
TABLET ORAL
Qty: 180 TABLET | Refills: 3 | Status: SHIPPED | OUTPATIENT
Start: 2021-12-23 | End: 2022-01-24 | Stop reason: SDUPTHER

## 2022-01-24 DIAGNOSIS — M05.79 RHEUMATOID ARTHRITIS INVOLVING MULTIPLE SITES WITH POSITIVE RHEUMATOID FACTOR: Primary | ICD-10-CM

## 2022-01-25 RX ORDER — TRAMADOL HYDROCHLORIDE 50 MG/1
TABLET ORAL
Qty: 180 TABLET | Refills: 3 | Status: SHIPPED | OUTPATIENT
Start: 2022-01-25 | End: 2022-08-25

## 2022-02-19 NOTE — PROGRESS NOTES
[facility] RHEUMATOLOGY            PROGRESS NOTE      Subjective:       Patient ID:   NAME: Ruma Nguyen : 1970     49 y.o. female    Referring Doc: No ref. provider found  Other Physicians:    Chief Complaint:  Rheumatoid Arthritis and Bursitis      History of Present Illness:     Patient returns today for a regularly scheduled follow-up visit for   rheumatoid arthritis    The patient is doing well.  Some arthralgias but no joint swelling.  No fevers cough or shortness of breath.  No chest pains.  Slight fatigue.  No prolonged morning stiffness          ROS:   GEN:  No  fever, night sweats . weight is stable   + sl fatigue  SKIN: no rashes, no bruising, no ulcerations, no Raynaud's  HEENT: no HA's, No visual changes, no mucosal ulcers, no sicca symptoms,  CV:   no CP, SOB, PND, MUÑOZ, no orthopnea, no palpitations  PULM: normal with no SOB, cough, hemoptysis, sputum or pleuritic pain  GI:  no abdominal pain, nausea, vomiting, constipation, diarrhea, melanotic stools, BRBPR, hematemesis, no dysphagia  :   no dysuria  NEURO: no paresthesias, headaches, visual disturbances, muscle weakness  MUSCULOSKELETAL:no joint swelling, prolonged AM stiffness, no back pain, + occ muscle pain  Allergies:  Review of patient's allergies indicates:  No Known Allergies    Medications:    Current Outpatient Medications:     aspirin (ECOTRIN) 81 MG EC tablet, Take 325 mg by mouth once daily. , Disp: , Rfl:     diclofenac (VOLTAREN) 50 MG EC tablet, Take 1 tablet (50 mg total) by mouth 2 (two) times daily as needed., Disp: 180 tablet, Rfl: 1    diclofenac sodium (VOLTAREN) 1 % Gel, Apply 2 g topically 4 (four) times daily., Disp: , Rfl:     hydrOXYchloroQUINE (PLAQUENIL) 200 mg tablet, Take 1 tablet (200 mg total) by mouth 2 (two) times daily., Disp: 180 tablet, Rfl: 1    traMADoL (ULTRAM) 50 mg tablet, 1-2 po q 8-12 hrs prn pain, Disp: 180 tablet, Rfl: 2    PMHx/PSHx Updates:        Last Eye Exam UTD    Objective:      Vitals:  Blood pressure 128/76, temperature 98 °F (36.7 °C), weight 76 kg (167 lb 9.6 oz).    Physical Examination:   GEN: no apparent distress, comfortable; AAOx3  SKIN: no rashes,no ulceration, no Raynaud's, no petechiae, no SQ nodules,  HEAD: normal  EYES: no pallor, no icterus  NECK: no masses, thyroid normal, trachea midline, no LAD/LN's, supple  CV: RRR with no murmur; l S1 and S2 reg. ,no gallop no rubs,   CHEST: Normal respiratory effort; CTAB; normal breath sounds; no wheeze or crackles  MUSC/Skeletal: ROM normal; no crepitus; joints without synovitis,  no deformities  No joint swelling or tenderness of PIP, MCP, wrist, elbow, shoulder, or knee joints  EXTREM: no clubbing, cyanosis, no edema,normal  pulses   NEURO: grossly intact; motor WNL; AAOx3; ,  PSYCH: normal mood, affect and behavior  LYMPH: normal cervical, supraclavicular          Labs:   Lab Results   Component Value Date    WBC 6.15 06/05/2020    HGB 14.3 06/05/2020    HCT 40.8 06/05/2020     (H) 06/05/2020     06/05/2020    CMP  @LASTLAB(NA,K,CL,CO2,GLU,BUN,Creatinine,Calcium,PROT,Albumin,Bilitot,Alkphos,AST,ALT,CRP,ESR,RF,CCP,HERNAN,SSA,CPK,uric acid) )@  I have reviewed all available lab results and radiology reports.    Radiology/Diagnostic Studies:        Assessment/Plan:   (1) 49 y.o. female with diagnosis of RA stable   Labs  FU in 4 months                Discussion:     I have explained all of the above in detail and the patient understands all of the current recommendation(s). I have answered all questions to the best of my ability and to their complete satisfaction.       The patient is to continue with the current management plan         RTC in         Electronically signed by Bella Crews MD           Other

## 2022-03-10 ENCOUNTER — PATIENT MESSAGE (OUTPATIENT)
Dept: RHEUMATOLOGY | Facility: CLINIC | Age: 52
End: 2022-03-10
Payer: COMMERCIAL

## 2022-05-02 ENCOUNTER — TELEPHONE (OUTPATIENT)
Dept: RHEUMATOLOGY | Facility: CLINIC | Age: 52
End: 2022-05-02

## 2022-05-02 ENCOUNTER — OFFICE VISIT (OUTPATIENT)
Dept: RHEUMATOLOGY | Facility: CLINIC | Age: 52
End: 2022-05-02
Payer: COMMERCIAL

## 2022-05-02 VITALS
BODY MASS INDEX: 26.81 KG/M2 | SYSTOLIC BLOOD PRESSURE: 111 MMHG | WEIGHT: 161.13 LBS | DIASTOLIC BLOOD PRESSURE: 74 MMHG

## 2022-05-02 DIAGNOSIS — M05.79 RHEUMATOID ARTHRITIS INVOLVING MULTIPLE SITES WITH POSITIVE RHEUMATOID FACTOR: ICD-10-CM

## 2022-05-02 DIAGNOSIS — M75.52 BURSITIS OF SHOULDER, LEFT: Primary | ICD-10-CM

## 2022-05-02 PROCEDURE — 3008F BODY MASS INDEX DOCD: CPT | Mod: S$GLB,,, | Performed by: INTERNAL MEDICINE

## 2022-05-02 PROCEDURE — 99213 PR OFFICE/OUTPT VISIT, EST, LEVL III, 20-29 MIN: ICD-10-PCS | Mod: 25,S$GLB,, | Performed by: INTERNAL MEDICINE

## 2022-05-02 PROCEDURE — 20610 LARGE JOINT ASPIRATION/INJECTION: L SUBACROMIAL BURSA: ICD-10-PCS | Mod: LT,S$GLB,, | Performed by: INTERNAL MEDICINE

## 2022-05-02 PROCEDURE — 3078F PR MOST RECENT DIASTOLIC BLOOD PRESSURE < 80 MM HG: ICD-10-PCS | Mod: S$GLB,,, | Performed by: INTERNAL MEDICINE

## 2022-05-02 PROCEDURE — 20610 DRAIN/INJ JOINT/BURSA W/O US: CPT | Mod: LT,S$GLB,, | Performed by: INTERNAL MEDICINE

## 2022-05-02 PROCEDURE — 3074F PR MOST RECENT SYSTOLIC BLOOD PRESSURE < 130 MM HG: ICD-10-PCS | Mod: S$GLB,,, | Performed by: INTERNAL MEDICINE

## 2022-05-02 PROCEDURE — 3008F PR BODY MASS INDEX (BMI) DOCUMENTED: ICD-10-PCS | Mod: S$GLB,,, | Performed by: INTERNAL MEDICINE

## 2022-05-02 PROCEDURE — 3078F DIAST BP <80 MM HG: CPT | Mod: S$GLB,,, | Performed by: INTERNAL MEDICINE

## 2022-05-02 PROCEDURE — 99213 OFFICE O/P EST LOW 20 MIN: CPT | Mod: 25,S$GLB,, | Performed by: INTERNAL MEDICINE

## 2022-05-02 PROCEDURE — 3074F SYST BP LT 130 MM HG: CPT | Mod: S$GLB,,, | Performed by: INTERNAL MEDICINE

## 2022-05-02 RX ORDER — HYDROXYCHLOROQUINE SULFATE 200 MG/1
200 TABLET, FILM COATED ORAL 2 TIMES DAILY
Qty: 180 TABLET | Refills: 1 | Status: SHIPPED | OUTPATIENT
Start: 2022-05-02 | End: 2022-10-04

## 2022-05-02 NOTE — PROGRESS NOTES
[facility] RHEUMATOLOGY            PROGRESS NOTE      Subjective:       Patient ID:   NAME: Ruma Nguyen : 1970     51 y.o. female    Referring Doc: No ref. provider found  Other Physicians:    Chief Complaint:  Rheumatoid Arthritis      History of Present Illness:     Patient returns today for a regularly scheduled follow-up visit for    RA   The patient is mourning the passing of her mother a few days ago  No fevers or chills.Very anxious , unable to sleep at bedtime  L shoulder pain.No hx of trauma.Pain worse with abduction  No paresthesias  Has pain in legs after ambulation.Low back pain occ            ROS:   GEN:  No  fever, night sweats . weight is stable   + fatigue  SKIN: no rashes, no bruising, no ulcerations, no Raynaud's  HEENT: no HA's, No visual changes, no mucosal ulcers, no sicca symptoms,  CV:   no CP, SOB, PND, MUÑOZ, no orthopnea, no palpitations  PULM: normal with no SOB, + occ NP cough, hemoptysis, sputum or pleuritic pain  GI:  no abdominal pain, nausea, vomiting, constipation, diarrhea, melanotic stools, BRBPR, hematemesis, no dysphagia  NEURO: no paresthesias, headaches, visual disturbances, muscle weakness  MUSCULOSKELETAL:no joint swelling, prolonged AM stiffness, + occ back pain, no muscle pain  Allergies:  Review of patient's allergies indicates:  No Known Allergies    Medications:    Current Outpatient Medications:     aspirin (ECOTRIN) 81 MG EC tablet, Take 324 mg by mouth once daily., Disp: , Rfl:     diclofenac sodium (VOLTAREN) 1 % Gel, Apply 2 g topically 4 (four) times daily., Disp: , Rfl:     nicotine polacrilex (NICORETTE) 4 MG Gum, Take 1 each (4 mg total) by mouth as needed., Disp: 190 each, Rfl: 1    traMADoL (ULTRAM) 50 mg tablet, 1-2 po q 8 hrs prn severe pain, Disp: 180 tablet, Rfl: 3    varenicline (CHANTIX STARTING MONTH BOX) 0.5 mg (11)- 1 mg (42) tablet, Take one 0.5mg tab by mouth once daily X3 days,then increase to one 0.5mg tab twice daily X4  "days,then increase to one 1mg tab twice daily, Disp: 1 each, Rfl: 0    hydrOXYchloroQUINE (PLAQUENIL) 200 mg tablet, Take 1 tablet (200 mg total) by mouth 2 (two) times daily., Disp: 180 tablet, Rfl: 1    PMHx/PSHx Updates:        Last Eye Exam      Objective:     Vitals:  Blood pressure 111/74, weight 73.1 kg (161 lb 1.6 oz).    Physical Examination:   GEN: no apparent distress, comfortable; AAOx3  SKIN: no rashes,no ulceration, no Raynaud's, no petechiae, no SQ nodules,  HEAD: normal  EYES: no pallor, no icterus  NECK: no masses, thyroid normal, trachea midline, no LAD/LN's, supple  CV: RRR with no murmur; l S1 and S2 reg. ,no gallop no rubs,   CHEST: Normal respiratory effort; CTAB; normal breath sounds; no wheeze or crackles  MUSC/Skeletal: ROM normal; no crepitus; joints without synovitis,  no deformities  No joint swelling or tenderness of PIP, MCP, wrist, elbow, Rshoulder, or knee joints.L shoulder without swelling or erythema.Abduction to 95. Int and external rotation preservedEXTREM: no clubbing, cyanosis, no edema,  NEURO: grossly intact; motor WNL; AAOx3;   PSYCH: normal mood, affect and behavior  LYMPH: normal cervical, supraclavicular          Labs:   Lab Results   Component Value Date    WBC 6.15 06/05/2020    HGB 14.3 06/05/2020    HCT 40.8 06/05/2020     (H) 06/05/2020     06/05/2020    CMP  @LASTLAB(NA,K,CL,CO2,GLU,BUN,Creatinine,Calcium,PROT,Albumin,Bilitot,Alkphos,AST,ALT,CRP,ESR,RF,CCP,HERNAN,SSA,CPK,uric acid) )@  I have reviewed all available lab results and radiology reports.    Radiology/Diagnostic Studies:        Assessment/Plan:   (1) 51 y.o. female with diagnosis of seronegative Rheumatoid Arthritis. Stable on Plaquenil  Fibromyalgia  LSA Bursitis.Injected as per procedure note  Hx + antiphospholipids ( neg HERNAN/SSA)  Smoker  Claudication sxs.Needs further bowling/RO PVD vs spinal stenosis  Anxiety aggravated by recent death of her mother.She requests mild " nerve pill"  Klonopin .5 " mg # 30.No refills. Do not take with Tramadol      Pt will see rheum in [place] in 2 months      Discussion:     I have explained all of the above in detail and the patient understands all of the current recommendation(s). I have answered all questions to the best of my ability and to their complete satisfaction.       The patient is to continue with the current management plan         RTC in         Electronically signed by Bella Crews MD

## 2022-05-02 NOTE — TELEPHONE ENCOUNTER
Klonopin .05 mg  # 30 no refills   Tramadol 50 mg # 180 no refills- 1-2  tabs Q 8 hrs prn pain   Called into [facility] per verbal order Dr. IRMA Crews.

## 2022-05-02 NOTE — PROCEDURES
Large Joint Aspiration/Injection: L subacromial bursa    Date/Time: 5/2/2022 4:00 PM  Performed by: Bella Crews MD  Authorized by: Bella Crews MD     Consent Done?:  Yes (Verbal)  Indications:  Pain  Site marked: the procedure site was marked    Timeout: prior to procedure the correct patient, procedure, and site was verified    Prep: patient was prepped and draped in usual sterile fashion    Local anesthetic:  Lidocaine 2% without epinephrine  Location:  Shoulder  Site:  L subacromial bursa  Patient tolerance:  Patient tolerated the procedure well with no immediate complications    injected 1 cc kenalog,1 cc Dexamethasone LSA Bursa

## 2022-08-16 ENCOUNTER — OFFICE VISIT (OUTPATIENT)
Dept: RHEUMATOLOGY | Facility: CLINIC | Age: 52
End: 2022-08-16
Payer: COMMERCIAL

## 2022-08-16 VITALS
WEIGHT: 147.81 LBS | SYSTOLIC BLOOD PRESSURE: 151 MMHG | HEIGHT: 65 IN | BODY MASS INDEX: 24.63 KG/M2 | HEART RATE: 67 BPM | DIASTOLIC BLOOD PRESSURE: 78 MMHG

## 2022-08-16 DIAGNOSIS — M06.9 RHEUMATOID ARTHRITIS INVOLVING MULTIPLE SITES, UNSPECIFIED WHETHER RHEUMATOID FACTOR PRESENT: Primary | ICD-10-CM

## 2022-08-16 DIAGNOSIS — R76.0 ANTIPHOSPHOLIPID ANTIBODY POSITIVE: ICD-10-CM

## 2022-08-16 DIAGNOSIS — M75.52 BURSITIS OF SHOULDER, LEFT: ICD-10-CM

## 2022-08-16 PROCEDURE — 99999 PR PBB SHADOW E&M-EST. PATIENT-LVL III: CPT | Mod: PBBFAC,,,

## 2022-08-16 PROCEDURE — 99999 PR PBB SHADOW E&M-EST. PATIENT-LVL III: ICD-10-PCS | Mod: PBBFAC,,,

## 2022-08-16 PROCEDURE — 3078F DIAST BP <80 MM HG: CPT | Mod: CPTII,S$GLB,,

## 2022-08-16 PROCEDURE — 3008F BODY MASS INDEX DOCD: CPT | Mod: CPTII,S$GLB,,

## 2022-08-16 PROCEDURE — 3078F PR MOST RECENT DIASTOLIC BLOOD PRESSURE < 80 MM HG: ICD-10-PCS | Mod: CPTII,S$GLB,,

## 2022-08-16 PROCEDURE — 3077F SYST BP >= 140 MM HG: CPT | Mod: CPTII,S$GLB,,

## 2022-08-16 PROCEDURE — 3077F PR MOST RECENT SYSTOLIC BLOOD PRESSURE >= 140 MM HG: ICD-10-PCS | Mod: CPTII,S$GLB,,

## 2022-08-16 PROCEDURE — 1159F MED LIST DOCD IN RCRD: CPT | Mod: CPTII,S$GLB,,

## 2022-08-16 PROCEDURE — 1160F RVW MEDS BY RX/DR IN RCRD: CPT | Mod: CPTII,S$GLB,,

## 2022-08-16 PROCEDURE — 1160F PR REVIEW ALL MEDS BY PRESCRIBER/CLIN PHARMACIST DOCUMENTED: ICD-10-PCS | Mod: CPTII,S$GLB,,

## 2022-08-16 PROCEDURE — 3008F PR BODY MASS INDEX (BMI) DOCUMENTED: ICD-10-PCS | Mod: CPTII,S$GLB,,

## 2022-08-16 PROCEDURE — 99205 OFFICE O/P NEW HI 60 MIN: CPT | Mod: S$GLB,,,

## 2022-08-16 PROCEDURE — 99205 PR OFFICE/OUTPT VISIT, NEW, LEVL V, 60-74 MIN: ICD-10-PCS | Mod: S$GLB,,,

## 2022-08-16 PROCEDURE — 1159F PR MEDICATION LIST DOCUMENTED IN MEDICAL RECORD: ICD-10-PCS | Mod: CPTII,S$GLB,,

## 2022-08-16 RX ORDER — ARIPIPRAZOLE 2 MG/1
2 TABLET ORAL DAILY
COMMUNITY
Start: 2022-08-10 | End: 2022-12-19

## 2022-08-16 NOTE — PROGRESS NOTES
"         RHEUMATOLOGY OUTPATIENT CLINIC NOTE    08/16/2022    Subjective:       Patient ID: Ruma Nguyen is a 51 y.o. female.    Chief Complaint: Rheumatoid Arthritis      HPI   Ruma Nguyen is a 51 y.o. pleasant female here for rheumatology initial evaluation.   Current patient of Dr. Crews, first time in this clinic and first time seeing me.  She states that doctor said rash is retiring and so she needs to establish with new rheumatologist.  She states she is here today to establish with our clinic so that she can have her medications renewed in the future.  Her  joints her today in the clinic in helps provide some of the history.    She was previously being treated as seropositive rheumatoid arthritis with prior positive antiphospholipid antibodies.   She was initially treated in [place] for many years.  She states that they had her on a large bag of medications." She recalls previously being on methotrexate subcu injections and B12 injections, but does not recall the other medications.    When she moved to [place], Dr. Crews decreased her medications and has her on a current regimen of Plaquenil 200 mg twice daily and tramadol 50 mg b.i.d.-t.i.d..  She states she takes the tramadol daily.  If she misses any of the days of Plaquenil, she notes increased joint pain, stiffness, most notably in her hands and feet.  She also reports a heaviness in her legs worse with increased activity which has been going on since prior to starting Plaquenil.  Gabapentin 900 mg in the past which she says made her feel like an air head.  States that she is previously done a nerve conduction study but does not recall the results.    She reports morning stiffness lasting 2 hours mostly in her hands and feet.  No swelling of any joints, no erythema, no increased warmth any joints.  No fevers, no chills.  No infections.    No pleuritic chest pain, no rash, no shortness of breath.  No personal history of " "psoriasis.    Rheumatologic review of systems negative otherwise.     Family history includes mother with lupus (diagnosis shortly before her recent death), arthritis (unknown what kind), grandma with rheumatoid arthritis.    Physical exam with no synovitis, no swelling, no increased warmth, no erythema to any joints.  Good fist formation bilaterally.  Strength 5/5 bilateral upper and lower extremities.  No lower extremity edema.    Past Medical History:   Diagnosis Date    Antiphospholipid syndrome     Cancer     skin, melanoma    Colon polyps     Heart murmur     Hyperlipidemia     RA (rheumatoid arthritis)     Radiculopathy     Rheumatoid arthritis of multiple sites without rheumatoid factor      Past Surgical History:   Procedure Laterality Date    ADENOIDECTOMY      BREAST SURGERY  2006    Augmentation     SECTION      COLONOSCOPY N/A 2020    Procedure: COLONOSCOPY;  Surgeon: Lalo De Leon MD;  Location: [facility];  Service: Endoscopy;  Laterality: N/A;    HERNIA REPAIR      x2    POLYPECTOMY      TUBAL LIGATION      TUBAL LIGATION Bilateral      Family History   Problem Relation Age of Onset    Cancer Father     Colon cancer Father     Heart disease Mother     Hypertension Mother     No Known Problems Sister     Diabetes Brother     Kidney cancer Brother      Social History     Socioeconomic History    Marital status:    Tobacco Use    Smoking status: Current Every Day Smoker    Smokeless tobacco: Never Used   Substance and Sexual Activity    Alcohol use: No    Drug use: No    Sexual activity: Yes     Partners: Male     Review of patient's allergies indicates:  No Known Allergies        Objective:   BP (!) 151/78   Pulse 67   Ht 5' 5" (1.651 m)   Wt 67 kg (147 lb 13.1 oz)   LMP 2022 (Approximate)   BMI 24.60 kg/m²   Immunization History   Administered Date(s) Administered    COVID-19, MRNA, LN-S, PF (MODERNA FULL 0.5 ML DOSE) 2021, " 06/09/2021, 01/17/2022    Influenza 09/25/2015    Influenza - Quadrivalent - PF *Preferred* (6 months and older) 09/27/2021    Pneumococcal Conjugate - 13 Valent 09/25/2015, 02/21/2022              No results found for this or any previous visit (from the past 672 hour(s)).     No results found for: TBGOLDPLUS   No results found for: HAV, HEPAIGM, HEPBIGM, HEPBCAB, HBEAG, HEPCAB     Assessment:       1. Rheumatoid arthritis involving multiple sites, unspecified whether rheumatoid factor present    2. Antiphospholipid antibody positive    3. Bursitis of shoulder, left          Impression:     Polyarthralgias and historical diagnosis of rheumatoid arthritis  Initially seen in [place].  Patient states she was on many medications at that time, can only recall subcutaneous methotrexate.  States that she was previously with positive rheumatoid factor.  Most recent rheumatoid factor IC and labs are both negative.    She then moved to [place] and started seeing Dr. Crews.    Plaquenil -gylflto   Tramadol 09/20/2017-present  States anytime off of Plaquenil causes worsening hand arthralgias, morning stiffness.    External hand and foot x-rays from 07/11/2022 radiology report with preserved joint spaces.  External labs 07/11/2022 reviewed and discussed with patient.  She states that she was given medication for UTI at that time.  Currently without symptoms.    Leg pain   Patient reports the sensation of heaviness in her bilateral lower extremities worse with increased activity.  Occasional ankle swelling not seen on physical exam today.    States she had a previous nerve conduction study but does not recall the results.    States she was previously on gabapentin 900 mg daily with cognitive side effects.    Denies ever being on lower dosage of gabapentin.  Does not want to add additional medication to her regimen at this time.    Bursitis of left shoulder   Previously received injections with good relief.  Currently  not an issue     Other specified counseling  Over 10 minutes spent regarding below topics:  - Nutrition and exercise counseling.  - Medication counseling provided.     Plan:          For now continue Plaquenil and tramadol as prescribed by Dr. Crews.  Patient states she will continue with external rheumatologist until the doctor retires this December.  She will receive medication refills from her in the meantime.    Recommend annual eye exams while on Plaquenil    Patient would like to establish with a physician within our clinic.    I discussed with her that I am unable to prescribe tramadol.    Pt counseled on use of tramadol. Risks versus benefits including potential for dependency discussed and literature sent home with patient today.  Pt counseled on potential side effects including constipation, worsening sleep apnea and impairment of operation of equipment or vehicles. Patient has been instructed to avoid driving or use of equipment or machinery while under the influence of this medication. Do not drink alcohol while on this medication.   Pt counseled on using meds only as needed and goal is to try and reduce the frequency and dose used. Avoid long term use. Pt verbalized understanding of all described above.       Patient states that she does have her records from [place] but forgot to bring them today.    Encouraged patient to send us these records via [employer] at her earliest convenience.    For leg heaviness, recommend increase physical activity, elevate legs above level of heart, compression stockings.  No redness, no increased warmth, no swelling of bilateral lower extremities seen on physical exam today.      Follow up with MD at earliest schedule availability.    Felicia Rajan PA-C  [facility]  Rheumatology       40 minutes of total time spent on the encounter, which includes face to face time and non-face to face time preparing to see the patient (eg, review of  tests), Obtaining and/or reviewing separately obtained history, Documenting clinical information in the electronic or other health record, Independently interpreting results (not separately reported) and communicating results to the patient/family/caregiver, or Care coordination (not separately reported).

## 2022-08-29 DIAGNOSIS — M05.79 RHEUMATOID ARTHRITIS INVOLVING MULTIPLE SITES WITH POSITIVE RHEUMATOID FACTOR: Primary | ICD-10-CM

## 2022-08-29 RX ORDER — TRAMADOL HYDROCHLORIDE 50 MG/1
TABLET ORAL
Qty: 90 TABLET | Refills: 0 | Status: CANCELLED | OUTPATIENT
Start: 2022-08-29

## 2022-09-13 ENCOUNTER — OFFICE VISIT (OUTPATIENT)
Dept: RHEUMATOLOGY | Facility: CLINIC | Age: 52
End: 2022-09-13
Payer: COMMERCIAL

## 2022-09-13 VITALS
WEIGHT: 147.63 LBS | SYSTOLIC BLOOD PRESSURE: 126 MMHG | DIASTOLIC BLOOD PRESSURE: 74 MMHG | BODY MASS INDEX: 24.56 KG/M2

## 2022-09-13 DIAGNOSIS — M85.89 OTHER SPECIFIED DISORDERS OF BONE DENSITY AND STRUCTURE, MULTIPLE SITES: ICD-10-CM

## 2022-09-13 DIAGNOSIS — M06.9 RHEUMATOID ARTHRITIS INVOLVING MULTIPLE SITES, UNSPECIFIED WHETHER RHEUMATOID FACTOR PRESENT: Primary | ICD-10-CM

## 2022-09-13 DIAGNOSIS — R76.0 ANTIPHOSPHOLIPID ANTIBODY POSITIVE: ICD-10-CM

## 2022-09-13 PROCEDURE — 99214 OFFICE O/P EST MOD 30 MIN: CPT | Mod: S$GLB,,, | Performed by: INTERNAL MEDICINE

## 2022-09-13 PROCEDURE — 3078F PR MOST RECENT DIASTOLIC BLOOD PRESSURE < 80 MM HG: ICD-10-PCS | Mod: CPTII,S$GLB,, | Performed by: INTERNAL MEDICINE

## 2022-09-13 PROCEDURE — 1159F PR MEDICATION LIST DOCUMENTED IN MEDICAL RECORD: ICD-10-PCS | Mod: CPTII,S$GLB,, | Performed by: INTERNAL MEDICINE

## 2022-09-13 PROCEDURE — 3074F SYST BP LT 130 MM HG: CPT | Mod: CPTII,S$GLB,, | Performed by: INTERNAL MEDICINE

## 2022-09-13 PROCEDURE — 3074F PR MOST RECENT SYSTOLIC BLOOD PRESSURE < 130 MM HG: ICD-10-PCS | Mod: CPTII,S$GLB,, | Performed by: INTERNAL MEDICINE

## 2022-09-13 PROCEDURE — 99214 PR OFFICE/OUTPT VISIT, EST, LEVL IV, 30-39 MIN: ICD-10-PCS | Mod: S$GLB,,, | Performed by: INTERNAL MEDICINE

## 2022-09-13 PROCEDURE — 3078F DIAST BP <80 MM HG: CPT | Mod: CPTII,S$GLB,, | Performed by: INTERNAL MEDICINE

## 2022-09-13 PROCEDURE — 3008F PR BODY MASS INDEX (BMI) DOCUMENTED: ICD-10-PCS | Mod: CPTII,S$GLB,, | Performed by: INTERNAL MEDICINE

## 2022-09-13 PROCEDURE — 1159F MED LIST DOCD IN RCRD: CPT | Mod: CPTII,S$GLB,, | Performed by: INTERNAL MEDICINE

## 2022-09-13 PROCEDURE — 3008F BODY MASS INDEX DOCD: CPT | Mod: CPTII,S$GLB,, | Performed by: INTERNAL MEDICINE

## 2022-09-13 NOTE — PROGRESS NOTES
[facility] RHEUMATOLOGY            PROGRESS NOTE      Subjective:       Patient ID:   NAME: Ruma Nguyen : 1970     51 y.o. female    Referring Doc: No ref. provider found  Other Physicians:    Chief Complaint:  Rheumatoid Arthritis      History of Present Illness:     Patient returns today for a regularly scheduled follow-up visit for fibromyalgia ,osteoarthritis ,seronegative rheumatoid arthritis and positive antiphospholipid antibodies    The patient has   Morning stiffness lasting usually an hour occasionally 2 hours.  Occasional arthralgias in PIP joints and MCP joints.  No fevers or chest pains .  Occasional cough( still smokes)  No hemoptysis.  Occasional dyspnea on exertion.  No chest pains or palpitations.  No GI complaints.  No paresthesias.    ROS:   GEN:  No  fever, night sweats . weight is stable   + fatigue  SKIN: no rashes, no bruising, no ulcerations, no Raynaud's  HEENT: no HA's, No visual changes, no mucosal ulcers,  CV:   no CP, SOB, PND, MUÑOZ, no orthopnea, no palpitations  PULM: normal with no SOB, + occ NP cough, No hemoptysis, sputum or pleuritic pain  GI:  no abdominal pain, nausea, vomiting, constipation, diarrhea, melanotic stools, BRBPR, hematemesis, no dysphagia  NEURO: no paresthesias, headaches, visual disturbances, muscle weakness  MUSCULOSKELETAL:no joint swelling, prolonged AM stiffness, no back pain, + muscle pain  Allergies:  Review of patient's allergies indicates:  No Known Allergies    Medications:    Current Outpatient Medications:     ARIPiprazole (ABILIFY) 2 MG Tab, Take 2 mg by mouth once daily., Disp: , Rfl:     aspirin (ECOTRIN) 81 MG EC tablet, Take 324 mg by mouth once daily., Disp: , Rfl:     clonazePAM (KLONOPIN) 1 MG tablet, Take 1 tablet (1 mg total) by mouth daily as needed for Anxiety., Disp: 30 tablet, Rfl: 1    diclofenac sodium (VOLTAREN) 1 % Gel, Apply 2 g topically 4 (four) times daily., Disp: , Rfl:     hydrOXYchloroQUINE (PLAQUENIL) 200 mg  tablet, Take 1 tablet (200 mg total) by mouth 2 (two) times daily., Disp: 180 tablet, Rfl: 1    traMADoL (ULTRAM) 50 mg tablet, TAKE 1 BY MOUTH EVERY 8 HOURS AS NEEDED FOR SEVERE PAIN, Disp: 90 tablet, Rfl: 0    varenicline (CHANTIX STARTING MONTH BOX) 0.5 mg (11)- 1 mg (42) tablet, Take one 0.5mg tab by mouth once daily X3 days,then increase to one 0.5mg tab twice daily X4 days,then increase to one 1mg tab twice daily (Patient not taking: No sig reported), Disp: 1 tablet, Rfl: 0    PMHx/PSHx Updates:      Last Eye Exam UTD  Hearing Impaired    Objective:     Vitals:  Blood pressure 126/74, weight 67 kg (147 lb 9.6 oz).    Physical Examination:   GEN: no apparent distress, comfortable; AAOx3  SKIN: no rashes,no ulceration, no Raynaud's, no petechiae, no SQ nodules,  HEAD: normal  EYES: no pallor, no icterus,  NECK: no masses, thyroid normal, trachea midline, no LAD/LN's, supple  CV: RRR w l S1 and S2 reg. ,no gallop no rubs,   CHEST: Normal respiratory effort; CTAB; normal breath sounds; no wheeze or crackles  MUSC/Skeletal:  no crepitus; joints without synovitis,  no deformities  No joint swelling or tenderness of  MCP, wrist, elbow, shoulder, or knee joints  EXTREM: no clubbing, cyanosis, no edema  NEURO: grossly intact; motor WNL; AAOx3; ,  PSYCH: normal mood, affect and behavior  LYMPH: normal cervical, supraclavicular          Labs:   Lab Results   Component Value Date    WBC 6.15 06/05/2020    HGB 14.3 06/05/2020    HCT 40.8 06/05/2020     (H) 06/05/2020     06/05/2020    CMP  @LASTLAB(NA,K,CL,CO2,GLU,BUN,Creatinine,Calcium,PROT,Albumin,Bilitot,Alkphos,AST,ALT,CRP,ESR,RF,CCP,HERNAN,SSA,CPK,uric acid) )@  I have reviewed all available lab results and radiology reports.    Radiology/Diagnostic Studies:        Assessment/Plan:   (1) 51 y.o. female with diagnosis of history of seronegative rheumatoid arthritis. This is stable , on Plaquenil.( -RF,-CCP,-HERNAN,-SSA)   Hand , feet Xrays without erosive  changes  Hx + antiphospholipids with no hx thromboembolic events ( few yrs ago had episode of amaurosis fugax)  Smoker ( 1ppd).  She will see primary care for smoking cessation treatment  Fibromyalgia more symptomatic.  She understands need to taper off Tramadol .  Will get refill of #60 in Oct,# 30 in Nov    FU here in 2months      Discussion:     I have explained all of the above in detail and the patient understands all of the current recommendation(s). I have answered all questions to the best of my ability and to their complete satisfaction.       The patient is to continue with the current management plan         RTC in         Electronically signed by Bella Crews MD    Patient notified that this office will be closing December 22, 2022. They should begin looking for another rheumatologist as soon as possible.  A list with the names and contact details of rheumatologists in the surrounding area was provided.

## 2022-10-12 DIAGNOSIS — M05.79 RHEUMATOID ARTHRITIS INVOLVING MULTIPLE SITES WITH POSITIVE RHEUMATOID FACTOR: ICD-10-CM

## 2022-10-12 RX ORDER — TRAMADOL HYDROCHLORIDE 50 MG/1
50 TABLET ORAL EVERY 12 HOURS PRN
Qty: 60 TABLET | Refills: 0 | Status: SHIPPED | OUTPATIENT
Start: 2022-10-12 | End: 2022-12-19

## 2023-01-11 ENCOUNTER — OFFICE VISIT (OUTPATIENT)
Dept: RHEUMATOLOGY | Facility: CLINIC | Age: 53
End: 2023-01-11
Payer: COMMERCIAL

## 2023-01-11 ENCOUNTER — HOSPITAL ENCOUNTER (OUTPATIENT)
Dept: RADIOLOGY | Facility: HOSPITAL | Age: 53
Discharge: HOME | End: 2023-01-11
Attending: INTERNAL MEDICINE
Payer: COMMERCIAL

## 2023-01-11 VITALS
DIASTOLIC BLOOD PRESSURE: 76 MMHG | HEART RATE: 62 BPM | HEIGHT: 65 IN | BODY MASS INDEX: 25.13 KG/M2 | SYSTOLIC BLOOD PRESSURE: 134 MMHG | WEIGHT: 150.81 LBS

## 2023-01-11 DIAGNOSIS — G89.29 CHRONIC MIDLINE LOW BACK PAIN WITH LEFT-SIDED SCIATICA: ICD-10-CM

## 2023-01-11 DIAGNOSIS — M54.42 CHRONIC MIDLINE LOW BACK PAIN WITH LEFT-SIDED SCIATICA: ICD-10-CM

## 2023-01-11 DIAGNOSIS — D68.61 ANTIPHOSPHOLIPID SYNDROME: ICD-10-CM

## 2023-01-11 DIAGNOSIS — Z79.899 LONG-TERM USE OF PLAQUENIL: ICD-10-CM

## 2023-01-11 DIAGNOSIS — M06.09 RHEUMATOID ARTHRITIS OF MULTIPLE SITES WITH NEGATIVE RHEUMATOID FACTOR: Primary | ICD-10-CM

## 2023-01-11 DIAGNOSIS — G89.4 CHRONIC PAIN SYNDROME: ICD-10-CM

## 2023-01-11 DIAGNOSIS — I35.1 NONRHEUMATIC AORTIC VALVE INSUFFICIENCY: ICD-10-CM

## 2023-01-11 PROBLEM — R76.0 ANTIPHOSPHOLIPID ANTIBODY POSITIVE: Status: ACTIVE | Noted: 2023-01-11

## 2023-01-11 PROCEDURE — 3008F BODY MASS INDEX DOCD: CPT | Mod: CPTII,S$GLB,, | Performed by: INTERNAL MEDICINE

## 2023-01-11 PROCEDURE — 3078F PR MOST RECENT DIASTOLIC BLOOD PRESSURE < 80 MM HG: ICD-10-PCS | Mod: CPTII,S$GLB,, | Performed by: INTERNAL MEDICINE

## 2023-01-11 PROCEDURE — 3075F SYST BP GE 130 - 139MM HG: CPT | Mod: CPTII,S$GLB,, | Performed by: INTERNAL MEDICINE

## 2023-01-11 PROCEDURE — 3008F PR BODY MASS INDEX (BMI) DOCUMENTED: ICD-10-PCS | Mod: CPTII,S$GLB,, | Performed by: INTERNAL MEDICINE

## 2023-01-11 PROCEDURE — 1160F PR REVIEW ALL MEDS BY PRESCRIBER/CLIN PHARMACIST DOCUMENTED: ICD-10-PCS | Mod: CPTII,S$GLB,, | Performed by: INTERNAL MEDICINE

## 2023-01-11 PROCEDURE — 99999 PR PBB SHADOW E&M-EST. PATIENT-LVL III: CPT | Mod: PBBFAC,,, | Performed by: INTERNAL MEDICINE

## 2023-01-11 PROCEDURE — 99999 PR PBB SHADOW E&M-EST. PATIENT-LVL III: ICD-10-PCS | Mod: PBBFAC,,, | Performed by: INTERNAL MEDICINE

## 2023-01-11 PROCEDURE — 99214 PR OFFICE/OUTPT VISIT, EST, LEVL IV, 30-39 MIN: ICD-10-PCS | Mod: S$GLB,,, | Performed by: INTERNAL MEDICINE

## 2023-01-11 PROCEDURE — 99214 OFFICE O/P EST MOD 30 MIN: CPT | Mod: S$GLB,,, | Performed by: INTERNAL MEDICINE

## 2023-01-11 PROCEDURE — 1159F PR MEDICATION LIST DOCUMENTED IN MEDICAL RECORD: ICD-10-PCS | Mod: CPTII,S$GLB,, | Performed by: INTERNAL MEDICINE

## 2023-01-11 PROCEDURE — 1160F RVW MEDS BY RX/DR IN RCRD: CPT | Mod: CPTII,S$GLB,, | Performed by: INTERNAL MEDICINE

## 2023-01-11 PROCEDURE — 72100 X-RAY EXAM L-S SPINE 2/3 VWS: CPT | Mod: 26,,, | Performed by: RADIOLOGY

## 2023-01-11 PROCEDURE — 1159F MED LIST DOCD IN RCRD: CPT | Mod: CPTII,S$GLB,, | Performed by: INTERNAL MEDICINE

## 2023-01-11 PROCEDURE — 72100 X-RAY EXAM L-S SPINE 2/3 VWS: CPT | Mod: TC

## 2023-01-11 PROCEDURE — 3078F DIAST BP <80 MM HG: CPT | Mod: CPTII,S$GLB,, | Performed by: INTERNAL MEDICINE

## 2023-01-11 PROCEDURE — 72100 XR LUMBAR SPINE AP AND LATERAL: ICD-10-PCS | Mod: 26,,, | Performed by: RADIOLOGY

## 2023-01-11 PROCEDURE — 3075F PR MOST RECENT SYSTOLIC BLOOD PRESS GE 130-139MM HG: ICD-10-PCS | Mod: CPTII,S$GLB,, | Performed by: INTERNAL MEDICINE

## 2023-01-11 RX ORDER — AZATHIOPRINE 50 MG/1
50 TABLET ORAL DAILY
Qty: 30 TABLET | Refills: 1 | Status: SHIPPED | OUTPATIENT
Start: 2023-01-11 | End: 2023-02-06

## 2023-01-11 RX ORDER — HYDROXYCHLOROQUINE SULFATE 200 MG/1
200 TABLET, FILM COATED ORAL 2 TIMES DAILY
Qty: 180 TABLET | Refills: 1 | Status: SHIPPED | OUTPATIENT
Start: 2023-01-11 | End: 2023-10-20 | Stop reason: SDUPTHER

## 2023-01-11 RX ORDER — TRAMADOL HYDROCHLORIDE 50 MG/1
50 TABLET ORAL
Qty: 30 TABLET | Refills: 2 | Status: SHIPPED | OUTPATIENT
Start: 2023-01-11 | End: 2023-05-12 | Stop reason: SDUPTHER

## 2023-01-11 NOTE — PROGRESS NOTES
RHEUMATOLOGY CLINIC INITIAL VISIT    Reason for consult:-  Rheumatoid arthritis, antiphospholipid antibody syndrome, worsening lower back pain.  Chief complaints, HPI, ROS, EXAM, Assessment & Plans:-  Ruma Nguyenkelle a 52 y.o. pleasant female comes in with longstanding history of seronegative rheumatoid arthritis and antiphospholipid antibody syndrome treated by external rheumatologist with Plaquenil.  Longstanding fibromyalgia treated with tramadol.  Complains of worsening lower back pain associated with left-sided sciatica.  No weakness.  No bladder or bowel incontinence.  History of amaurosis fugax.  Severe cardiovascular disease with calcification.  Aortic insufficiency managed by cardiologist.  Looking for a new cardiologist.  Echo stable as of 2021.  No photosensitive rash.  No sicca syndrome.  No Raynaud's phenomenon.  No history of purpura.  Rheumatological review of system negative otherwise.  Physical examination shows no significant synovitis of small joints.  No dactylitis or enthesitis.  Nail fold capillaries normal.  No sclerodactyly or telangiectasia.  1. Rheumatoid arthritis of multiple sites with negative rheumatoid factor    2. Long-term use of Plaquenil    3. Antiphospholipid syndrome    4. Chronic pain syndrome    5. Chronic midline low back pain with left-sided sciatica    6. Nonrheumatic aortic valve insufficiency      Problem List Items Addressed This Visit       Antiphospholipid syndrome    Relevant Medications    hydrOXYchloroQUINE (PLAQUENIL) 200 mg tablet    azaTHIOprine (IMURAN) 50 mg Tab    Other Relevant Orders    HERNAN Screen w/Reflex    C3 Complement    C4 Complement    CBC Auto Differential    Comprehensive Metabolic Panel    Sedimentation rate    C-Reactive Protein    Protein/Creatinine Ratio, Urine    Urinalysis    Cryoglobulin    Thiopurine Methyltrans (TPMT) Genotyping    Rheumatoid arthritis of multiple sites with negative rheumatoid factor - Primary    Relevant Medications     hydrOXYchloroQUINE (PLAQUENIL) 200 mg tablet    azaTHIOprine (IMURAN) 50 mg Tab    traMADoL (ULTRAM) 50 mg tablet    Other Relevant Orders    HERNAN Screen w/Reflex    C3 Complement    C4 Complement    CBC Auto Differential    Comprehensive Metabolic Panel    Sedimentation rate    C-Reactive Protein    Protein/Creatinine Ratio, Urine    Urinalysis    Cryoglobulin    Thiopurine Methyltrans (TPMT) Genotyping    Aortic insufficiency    Long-term use of Plaquenil    Chronic midline low back pain with left-sided sciatica    Relevant Medications    traMADoL (ULTRAM) 50 mg tablet    Other Relevant Orders    X-Ray Lumbar Spine AP And Lateral (Completed)    Ambulatory referral/consult to Back & Spine Clinic    Chronic pain syndrome    Relevant Medications    traMADoL (ULTRAM) 50 mg tablet       Severe seronegative rheumatoid arthritis with worsening large joint pain on Plaquenil.  Try low-dose Imuran.  Check TPMT enzyme activity.  Check labs as ordered today.  Check cryoglobulins 4 history of low C4.  Tramadol to treat fibromyalgia and chronic pain syndrome.  Advised all precautions.  X-ray lumbar spine and consult back and spine clinic for lumbar facet arthropathy and degenerative disc disease.  Repeat CBC CMP in 4 weeks to see whether she tolerates Imuran without any adverse effects.  Compromised immune system secondary to autoimmune disease and use of immunosuppressive drugs. Monitor carefully for infections. Advised to get immediate medical care if any infection. Also advised strict adherence to age appropriate vaccinations and cancer screenings with PCP.     # Follow up in about 3 months (around 4/11/2023).      Past Medical History:   Diagnosis Date    Antiphospholipid syndrome     Cancer     skin, melanoma    Colon polyps     Heart murmur     Hyperlipidemia     RA (rheumatoid arthritis)     Radiculopathy     Rheumatoid arthritis of multiple sites without rheumatoid factor        Past Surgical History:   Procedure  Laterality Date    ADENOIDECTOMY      BREAST SURGERY  2006    Augmentation     SECTION      COLONOSCOPY N/A 2020    Procedure: COLONOSCOPY;  Surgeon: Lalo De Leno MD;  [facility]  Service: Endoscopy;  Laterality: N/A;    HERNIA REPAIR      x2    POLYPECTOMY      TUBAL LIGATION      TUBAL LIGATION Bilateral         Social History     Tobacco Use    Smoking status: Every Day    Smokeless tobacco: Never   Substance Use Topics    Alcohol use: No    Drug use: No       Family History   Problem Relation Age of Onset    Cancer Father     Colon cancer Father     Heart disease Mother     Hypertension Mother     No Known Problems Sister     Diabetes Brother     Kidney cancer Brother        Review of patient's allergies indicates:  No Known Allergies    Medication List with Changes/Refills   New Medications    AZATHIOPRINE (IMURAN) 50 MG TAB    Take 1 tablet (50 mg total) by mouth once daily.    TRAMADOL (ULTRAM) 50 MG TABLET    Take 1 tablet (50 mg total) by mouth every 24 hours as needed for Pain.   Current Medications    AMLODIPINE (NORVASC) 5 MG TABLET    Take 1 tablet (5 mg total) by mouth every evening.    ASPIRIN (ECOTRIN) 81 MG EC TABLET    Take 324 mg by mouth once daily.    BUSPIRONE (BUSPAR) 15 MG TABLET    Take 15 mg by mouth 2 (two) times daily.    CLONAZEPAM (KLONOPIN) 1 MG TABLET    Take 1 tablet (1 mg total) by mouth daily as needed for Anxiety.   Changed and/or Refilled Medications    Modified Medication Previous Medication    HYDROXYCHLOROQUINE (PLAQUENIL) 200 MG TABLET hydrOXYchloroQUINE (PLAQUENIL) 200 mg tablet       Take 1 tablet (200 mg total) by mouth 2 (two) times daily.    TAKE 1 TABLET BY MOUTH TWICE A DAY         Thank you for allowing me to participate in the care ofRuma Nguyen.    Disclaimer: This note was prepared using voice recognition system and is likely to have sound alike errors and is not proof read.  Please call me with any questions.

## 2023-01-12 ENCOUNTER — TELEPHONE (OUTPATIENT)
Dept: PAIN MEDICINE | Facility: CLINIC | Age: 53
End: 2023-01-12
Payer: COMMERCIAL

## 2023-01-12 NOTE — TELEPHONE ENCOUNTER
LVM for patient to call back [phone] regarding appointment for Back and Spine referral.  Karli Larkin RN

## 2023-01-12 NOTE — PROGRESS NOTES
X-ray shows severe osteoarthritis of lower lumbar spine as expected.  I have sent a referral to see back in spine clinic.  They will contact you soon.

## 2023-01-13 ENCOUNTER — PATIENT MESSAGE (OUTPATIENT)
Dept: PHYSICAL MEDICINE AND REHAB | Facility: CLINIC | Age: 53
End: 2023-01-13
Payer: COMMERCIAL

## 2023-01-13 ENCOUNTER — TELEPHONE (OUTPATIENT)
Dept: PAIN MEDICINE | Facility: CLINIC | Age: 53
End: 2023-01-13
Payer: COMMERCIAL

## 2023-01-13 NOTE — TELEPHONE ENCOUNTER
Second attempt to contact patient for scheduling of referral for B&S. No answer, LVM.  Third attempt to contact patient, left message in patient portal to call back for scheduling of Back and Spine referral.    aKrli Larkin RN

## 2023-02-24 ENCOUNTER — LAB VISIT (OUTPATIENT)
Dept: LAB | Facility: HOSPITAL | Age: 53
End: 2023-02-24
Attending: INTERNAL MEDICINE
Payer: COMMERCIAL

## 2023-02-24 DIAGNOSIS — M06.09 RHEUMATOID ARTHRITIS OF MULTIPLE SITES WITH NEGATIVE RHEUMATOID FACTOR: ICD-10-CM

## 2023-02-24 DIAGNOSIS — D68.61 ANTIPHOSPHOLIPID SYNDROME: ICD-10-CM

## 2023-02-24 LAB
ALBUMIN SERPL BCP-MCNC: 3.9 G/DL (ref 3.5–5.2)
ALP SERPL-CCNC: 67 U/L (ref 55–135)
ALT SERPL W/O P-5'-P-CCNC: 18 U/L (ref 10–44)
ANION GAP SERPL CALC-SCNC: 12 MMOL/L (ref 8–16)
AST SERPL-CCNC: 22 U/L (ref 10–40)
BASOPHILS # BLD AUTO: 0.08 K/UL (ref 0–0.2)
BASOPHILS NFR BLD: 1.4 % (ref 0–1.9)
BILIRUB SERPL-MCNC: 0.5 MG/DL (ref 0.1–1)
BUN SERPL-MCNC: 6 MG/DL (ref 6–20)
C3 SERPL-MCNC: 94 MG/DL (ref 50–180)
C4 SERPL-MCNC: 3 MG/DL (ref 11–44)
CALCIUM SERPL-MCNC: 9.1 MG/DL (ref 8.7–10.5)
CHLORIDE SERPL-SCNC: 106 MMOL/L (ref 95–110)
CO2 SERPL-SCNC: 24 MMOL/L (ref 23–29)
CREAT SERPL-MCNC: 0.8 MG/DL (ref 0.5–1.4)
CRP SERPL-MCNC: 1.9 MG/L (ref 0–8.2)
DIFFERENTIAL METHOD: ABNORMAL
EOSINOPHIL # BLD AUTO: 0.2 K/UL (ref 0–0.5)
EOSINOPHIL NFR BLD: 3.3 % (ref 0–8)
ERYTHROCYTE [DISTWIDTH] IN BLOOD BY AUTOMATED COUNT: 14 % (ref 11.5–14.5)
ERYTHROCYTE [SEDIMENTATION RATE] IN BLOOD BY PHOTOMETRIC METHOD: 9 MM/HR (ref 0–36)
EST. GFR  (NO RACE VARIABLE): >60 ML/MIN/1.73 M^2
GLUCOSE SERPL-MCNC: 84 MG/DL (ref 70–110)
HCT VFR BLD AUTO: 43 % (ref 37–48.5)
HGB BLD-MCNC: 15.1 G/DL (ref 12–16)
IMM GRANULOCYTES # BLD AUTO: 0.01 K/UL (ref 0–0.04)
IMM GRANULOCYTES NFR BLD AUTO: 0.2 % (ref 0–0.5)
LYMPHOCYTES # BLD AUTO: 2.5 K/UL (ref 1–4.8)
LYMPHOCYTES NFR BLD: 43.7 % (ref 18–48)
MCH RBC QN AUTO: 34.6 PG (ref 27–31)
MCHC RBC AUTO-ENTMCNC: 35.1 G/DL (ref 32–36)
MCV RBC AUTO: 98 FL (ref 82–98)
MONOCYTES # BLD AUTO: 0.2 K/UL (ref 0.3–1)
MONOCYTES NFR BLD: 3.4 % (ref 4–15)
NEUTROPHILS # BLD AUTO: 2.8 K/UL (ref 1.8–7.7)
NEUTROPHILS NFR BLD: 48 % (ref 38–73)
NRBC BLD-RTO: 0 /100 WBC
PLATELET # BLD AUTO: 243 K/UL (ref 150–450)
PMV BLD AUTO: 9.3 FL (ref 9.2–12.9)
POTASSIUM SERPL-SCNC: 3.7 MMOL/L (ref 3.5–5.1)
PROT SERPL-MCNC: 7.3 G/DL (ref 6–8.4)
RBC # BLD AUTO: 4.37 M/UL (ref 4–5.4)
SODIUM SERPL-SCNC: 142 MMOL/L (ref 136–145)
WBC # BLD AUTO: 5.81 K/UL (ref 3.9–12.7)

## 2023-02-24 PROCEDURE — 82595 ASSAY OF CRYOGLOBULIN: CPT | Performed by: INTERNAL MEDICINE

## 2023-02-24 PROCEDURE — 86160 COMPLEMENT ANTIGEN: CPT | Performed by: INTERNAL MEDICINE

## 2023-02-24 PROCEDURE — 85025 COMPLETE CBC W/AUTO DIFF WBC: CPT | Mod: PO | Performed by: INTERNAL MEDICINE

## 2023-02-24 PROCEDURE — 86160 COMPLEMENT ANTIGEN: CPT | Mod: 59 | Performed by: INTERNAL MEDICINE

## 2023-02-24 PROCEDURE — 85651 RBC SED RATE NONAUTOMATED: CPT | Mod: PO | Performed by: INTERNAL MEDICINE

## 2023-02-24 PROCEDURE — 86038 ANTINUCLEAR ANTIBODIES: CPT | Performed by: INTERNAL MEDICINE

## 2023-02-24 PROCEDURE — 36415 COLL VENOUS BLD VENIPUNCTURE: CPT | Mod: PO | Performed by: INTERNAL MEDICINE

## 2023-02-24 PROCEDURE — 80053 COMPREHEN METABOLIC PANEL: CPT | Mod: PO | Performed by: INTERNAL MEDICINE

## 2023-02-24 PROCEDURE — 86140 C-REACTIVE PROTEIN: CPT | Performed by: INTERNAL MEDICINE

## 2023-03-01 LAB — ANA SER QL IF: NORMAL

## 2023-03-06 LAB — CRYOGLOB SER QL: NORMAL

## 2023-04-03 ENCOUNTER — TELEPHONE (OUTPATIENT)
Dept: CARDIOLOGY | Facility: CLINIC | Age: 53
End: 2023-04-03
Payer: COMMERCIAL

## 2023-04-03 NOTE — TELEPHONE ENCOUNTER
----- Message from Wander Solano MD sent at 4/3/2023  2:42 PM CDT -----  Please try to move up appt with dr parekh from July as pt has abn ef  in february

## 2023-04-20 ENCOUNTER — OFFICE VISIT (OUTPATIENT)
Dept: CARDIOLOGY | Facility: CLINIC | Age: 53
End: 2023-04-20
Payer: COMMERCIAL

## 2023-04-20 VITALS
HEIGHT: 65 IN | HEART RATE: 62 BPM | BODY MASS INDEX: 25.75 KG/M2 | WEIGHT: 154.56 LBS | SYSTOLIC BLOOD PRESSURE: 110 MMHG | DIASTOLIC BLOOD PRESSURE: 71 MMHG

## 2023-04-20 DIAGNOSIS — F17.200 SMOKER: Chronic | ICD-10-CM

## 2023-04-20 DIAGNOSIS — R94.30 LOW LEFT VENTRICULAR EJECTION FRACTION: ICD-10-CM

## 2023-04-20 DIAGNOSIS — R06.02 SOB (SHORTNESS OF BREATH): Primary | ICD-10-CM

## 2023-04-20 DIAGNOSIS — I35.0 NONRHEUMATIC AORTIC VALVE STENOSIS: Chronic | ICD-10-CM

## 2023-04-20 DIAGNOSIS — I73.9 CLAUDICATION: ICD-10-CM

## 2023-04-20 DIAGNOSIS — I70.0 ATHEROSCLEROSIS OF ABDOMINAL AORTA: Chronic | ICD-10-CM

## 2023-04-20 DIAGNOSIS — I08.0 MILD MITRAL AND AORTIC REGURGITATION: Chronic | ICD-10-CM

## 2023-04-20 DIAGNOSIS — E78.5 DYSLIPIDEMIA (HIGH LDL; LOW HDL): Chronic | ICD-10-CM

## 2023-04-20 PROBLEM — R93.1 LOW LEFT VENTRICULAR EJECTION FRACTION: Status: ACTIVE | Noted: 2023-04-20

## 2023-04-20 PROCEDURE — 3078F DIAST BP <80 MM HG: CPT | Mod: CPTII,S$GLB,, | Performed by: INTERNAL MEDICINE

## 2023-04-20 PROCEDURE — 1159F PR MEDICATION LIST DOCUMENTED IN MEDICAL RECORD: ICD-10-PCS | Mod: CPTII,S$GLB,, | Performed by: INTERNAL MEDICINE

## 2023-04-20 PROCEDURE — 99204 PR OFFICE/OUTPT VISIT, NEW, LEVL IV, 45-59 MIN: ICD-10-PCS | Mod: S$GLB,,, | Performed by: INTERNAL MEDICINE

## 2023-04-20 PROCEDURE — 3044F PR MOST RECENT HEMOGLOBIN A1C LEVEL <7.0%: ICD-10-PCS | Mod: CPTII,S$GLB,, | Performed by: INTERNAL MEDICINE

## 2023-04-20 PROCEDURE — 99999 PR PBB SHADOW E&M-EST. PATIENT-LVL IV: ICD-10-PCS | Mod: PBBFAC,,, | Performed by: INTERNAL MEDICINE

## 2023-04-20 PROCEDURE — 3008F PR BODY MASS INDEX (BMI) DOCUMENTED: ICD-10-PCS | Mod: CPTII,S$GLB,, | Performed by: INTERNAL MEDICINE

## 2023-04-20 PROCEDURE — 3074F PR MOST RECENT SYSTOLIC BLOOD PRESSURE < 130 MM HG: ICD-10-PCS | Mod: CPTII,S$GLB,, | Performed by: INTERNAL MEDICINE

## 2023-04-20 PROCEDURE — 3044F HG A1C LEVEL LT 7.0%: CPT | Mod: CPTII,S$GLB,, | Performed by: INTERNAL MEDICINE

## 2023-04-20 PROCEDURE — 3078F PR MOST RECENT DIASTOLIC BLOOD PRESSURE < 80 MM HG: ICD-10-PCS | Mod: CPTII,S$GLB,, | Performed by: INTERNAL MEDICINE

## 2023-04-20 PROCEDURE — 99204 OFFICE O/P NEW MOD 45 MIN: CPT | Mod: S$GLB,,, | Performed by: INTERNAL MEDICINE

## 2023-04-20 PROCEDURE — 3008F BODY MASS INDEX DOCD: CPT | Mod: CPTII,S$GLB,, | Performed by: INTERNAL MEDICINE

## 2023-04-20 PROCEDURE — 99999 PR PBB SHADOW E&M-EST. PATIENT-LVL IV: CPT | Mod: PBBFAC,,, | Performed by: INTERNAL MEDICINE

## 2023-04-20 PROCEDURE — 3074F SYST BP LT 130 MM HG: CPT | Mod: CPTII,S$GLB,, | Performed by: INTERNAL MEDICINE

## 2023-04-20 PROCEDURE — 1159F MED LIST DOCD IN RCRD: CPT | Mod: CPTII,S$GLB,, | Performed by: INTERNAL MEDICINE

## 2023-04-20 NOTE — PROGRESS NOTES
Subjective:    Patient ID:  Ruma Nguyen is a 52 y.o. female who presents for Coronary Artery Disease        Coronary Artery Disease  Symptoms include shortness of breath. Pertinent negatives include no chest pain, dizziness, leg swelling (ANKLES), palpitations or weight gain.     PT IS NEW TO ME, USED TO SEE DR WILSON, STILL SMOKES, RECENT ECHO, MILD TO MOD AI/AS, MILD MR, RECORDS REVIEWED, FH OF CAD, [place], SOME SOB, HEAVY LEGS, HA WITH HIGH BP BETTER SINCE ON MEDS, ABD AORTA CA ON CT, LAST HDL 31, LDL 72, RECORDS REVIEWED, SEE ROS  Past Medical History:   Diagnosis Date    Antiphospholipid syndrome     Cancer     skin, melanoma    Colon polyps     Heart murmur     Hyperlipidemia     RA (rheumatoid arthritis)     Radiculopathy     Rheumatoid arthritis of multiple sites without rheumatoid factor      Past Surgical History:   Procedure Laterality Date    ADENOIDECTOMY      BREAST SURGERY      Augmentation     SECTION      COLONOSCOPY N/A 2020    Procedure: COLONOSCOPY;  Surgeon: Lalo De Leon MD;  Location: [facility];  Service: Endoscopy;  Laterality: N/A;    HERNIA REPAIR      x2    POLYPECTOMY      TUBAL LIGATION      TUBAL LIGATION Bilateral      Family History   Problem Relation Age of Onset    Cancer Father     Colon cancer Father     Heart disease Mother     Hypertension Mother     No Known Problems Sister     Diabetes Brother     Kidney cancer Brother      Social History     Socioeconomic History    Marital status:    Tobacco Use    Smoking status: Every Day    Smokeless tobacco: Never   Substance and Sexual Activity    Alcohol use: No    Drug use: No    Sexual activity: Yes     Partners: Male       Review of patient's allergies indicates:  No Known Allergies    Current Outpatient Medications:     amLODIPine (NORVASC) 5 MG tablet, Take 1 tablet (5 mg total) by mouth every evening., Disp: 90 tablet, Rfl: 1    aspirin (ECOTRIN) 81 MG EC tablet, Take 324 mg by mouth once  "daily., Disp: , Rfl:     azaTHIOprine (IMURAN) 50 mg Tab, TAKE 1 TABLET BY MOUTH EVERY DAY, Disp: 90 tablet, Rfl: 1    clonazePAM (KLONOPIN) 1 MG tablet, Take 1 tablet (1 mg total) by mouth daily as needed for Anxiety. (Patient taking differently: Take 1 mg by mouth 2 (two) times daily as needed for Anxiety.), Disp: 30 tablet, Rfl: 1    hydrOXYchloroQUINE (PLAQUENIL) 200 mg tablet, Take 1 tablet (200 mg total) by mouth 2 (two) times daily., Disp: 180 tablet, Rfl: 1    traMADoL (ULTRAM) 50 mg tablet, Take 1 tablet (50 mg total) by mouth every 24 hours as needed for Pain., Disp: 30 tablet, Rfl: 2    EScitalopram oxalate (LEXAPRO) 20 MG tablet, Take 20 mg by mouth once daily., Disp: , Rfl:     Review of Systems   Constitutional: Negative for weight gain and weight loss.   HENT:  Positive for hearing loss (SINCE CHILDHOOD). Negative for congestion and sore throat.    Eyes:  Positive for blurred vision. Negative for redness.   Cardiovascular:  Positive for claudication and dyspnea on exertion. Negative for chest pain, cyanosis, irregular heartbeat, leg swelling (ANKLES), near-syncope, orthopnea (no PND), palpitations, paroxysmal nocturnal dyspnea and syncope.   Respiratory:  Positive for shortness of breath. Negative for cough, hemoptysis and wheezing.    Endocrine: Negative.    Skin: Negative.    Musculoskeletal:  Negative for back pain, joint pain, muscle cramps and myalgias.   Gastrointestinal:  Negative for diarrhea, hematemesis, melena, nausea and vomiting. Abdominal pain: SOME WITH MESH.  Genitourinary:  Negative for dysuria and flank pain.   Neurological:  Negative for dizziness, focal weakness, light-headedness, loss of balance and paresthesias (NEUROPATHY IN LEGS).   Psychiatric/Behavioral: Negative.     Allergic/Immunologic: Negative for hives and persistent infections.      Objective:      Vitals:    04/20/23 1506   BP: 110/71   Pulse: 62   Weight: 70.1 kg (154 lb 8.7 oz)   Height: 5' 5" (1.651 m)   PainSc: " 0-No pain     Body mass index is 25.72 kg/m².    Physical Exam  Constitutional:       Appearance: Normal appearance.   HENT:      Head: Normocephalic and atraumatic.   Eyes:      Extraocular Movements: Extraocular movements intact.      Conjunctiva/sclera: Conjunctivae normal.   Neck:      Vascular: No carotid bruit.   Cardiovascular:      Rate and Rhythm: Normal rate.      Pulses:           Carotid pulses are 2+ on the right side and 2+ on the left side.       Radial pulses are 2+ on the right side and 2+ on the left side.        Posterior tibial pulses are 2+ on the right side and 2+ on the left side.      Heart sounds: Murmur heard.   Systolic murmur is present with a grade of 2/6 at the upper right sternal border radiating to the neck.   Diastolic murmur is present with a grade of 1/4 at the upper right sternal border.     No friction rub. No gallop.   Pulmonary:      Effort: Pulmonary effort is normal.      Breath sounds: Normal breath sounds.   Abdominal:      Palpations: Abdomen is soft.      Tenderness: There is no abdominal tenderness.   Musculoskeletal:      Cervical back: Neck supple.      Right lower leg: No edema.      Left lower leg: No edema.   Skin:     Capillary Refill: Capillary refill takes less than 2 seconds.   Neurological:      General: No focal deficit present.      Mental Status: She is alert and oriented to person, place, and time.      Cranial Nerves: Cranial nerve deficit ([place]) present.   Psychiatric:         Mood and Affect: Mood normal.         Speech: Speech normal.         Behavior: Behavior normal.             ..    Chemistry        Component Value Date/Time     02/24/2023 1550     04/11/2019 1152    K 3.7 02/24/2023 1550     02/24/2023 1550     04/11/2019 1152    CO2 24 02/24/2023 1550    BUN 6 02/24/2023 1550    CREATININE 0.8 02/24/2023 1550    CREATININE 0.71 04/11/2019 1152    GLU 84 02/24/2023 1550    GLU 96 04/11/2019 1152        Component Value  Date/Time    CALCIUM 9.1 02/24/2023 1550    ALKPHOS 67 02/24/2023 1550    AST 22 02/24/2023 1550    AST 18 01/27/2016 0915    ALT 18 02/24/2023 1550    BILITOT 0.5 02/24/2023 1550    ESTGFRAFRICA >60 06/05/2020 1457    EGFRNONAA >60 06/05/2020 1457            ..  Lab Results   Component Value Date    CHOL 150 01/27/2016     Lab Results   Component Value Date    HDL 31 (L) 01/27/2016     Lab Results   Component Value Date    LDLCALC 72.4 01/27/2016     Lab Results   Component Value Date    TRIG 233 (H) 01/27/2016     Lab Results   Component Value Date    CHOLHDL 20.7 01/27/2016     ..  Lab Results   Component Value Date    WBC 5.81 02/24/2023    HGB 15.1 02/24/2023    HCT 43.0 02/24/2023    MCV 98 02/24/2023     02/24/2023       Test(s) Reviewed  I have reviewed the following in detail:  [] Stress test   [] Angiography   [x] Echocardiogram   [x] Labs   [x] Other:       Assessment:         ICD-10-CM ICD-9-CM   1. SOB (shortness of breath)  R06.02 786.05   2. Claudication  I73.9 443.9   3. Mild mitral and aortic regurgitation  I08.0 396.3   4. Low left ventricular ejection fraction  R94.30 794.30   5. Atherosclerosis of abdominal aorta  I70.0 440.0   6. Smoker  F17.200 305.1   7. Dyslipidemia (high LDL; low HDL)  E78.5 272.4   8. Nonrheumatic aortic valve stenosis  I35.0 424.1     Problem List Items Addressed This Visit          Pulmonary    SOB (shortness of breath) - Primary    Relevant Orders    Nuclear Stress - Cardiology Interpreted       Cardiac/Vascular    Dyslipidemia (high LDL; low HDL)    Relevant Orders    Lipid Panel    Atherosclerosis of abdominal aorta    Relevant Orders    Nuclear Stress - Cardiology Interpreted    Low left ventricular ejection fraction    Claudication    Relevant Orders    CV Ultrasound doppler arterial legs bilat    Mild mitral and aortic regurgitation    Nonrheumatic aortic valve stenosis    Relevant Orders    Nuclear Stress - Cardiology Interpreted       Other    Smoker     Relevant Orders    Ambulatory referral/consult to Smoking Cessation Program        Plan:     TOBACCO CESSATION COUNSELING EXPLAINED EFFECT ON ATHEROSCLEROSIS PROGRESSION, NEEDS FURTHER EVALUATION FOR ANGINA EQUIVALENT CHECK NUCLEAR STRESS ARTERIAL DOPPLER OF THE LOWER EXTREMITIES, NO OVERT HEART FAILURE CLINICALLY DIET WALKING EXERCISE RETURN TO CLINIC FEW WEEKS AFTER TEST,      SOB (shortness of breath)  Comments:  WORKUP  Orders:  -     Nuclear Stress - Cardiology Interpreted; Future    Claudication  Comments:  ARTERIAL DOPPLER  Orders:  -     CV Ultrasound doppler arterial legs bilat; Future    Mild mitral and aortic regurgitation  -     Ambulatory referral/consult to Cardiology    Low left ventricular ejection fraction  -     Ambulatory referral/consult to Cardiology    Atherosclerosis of abdominal aorta  Comments:  NO EMBOLIZATION  Orders:  -     Nuclear Stress - Cardiology Interpreted; Future    Smoker  Comments:  COUNSELING  Orders:  -     Ambulatory referral/consult to Smoking Cessation Program; Future; Expected date: 04/27/2023    Dyslipidemia (high LDL; low HDL)  -     Lipid Panel; Future; Expected date: 04/20/2023    Nonrheumatic aortic valve stenosis  -     Nuclear Stress - Cardiology Interpreted; Future    RTC Low level/low impact aerobic exercise 5x's/wk. Heart healthy diet and risk factor modification.    See labs and med orders.    Aerobic exercise 5x's/wk. Heart healthy diet and risk factor modification.    See labs and med orders.

## 2023-05-01 ENCOUNTER — TELEPHONE (OUTPATIENT)
Dept: PAIN MEDICINE | Facility: CLINIC | Age: 53
End: 2023-05-01
Payer: COMMERCIAL

## 2023-05-01 ENCOUNTER — LAB VISIT (OUTPATIENT)
Dept: LAB | Facility: HOSPITAL | Age: 53
End: 2023-05-01
Attending: INTERNAL MEDICINE
Payer: COMMERCIAL

## 2023-05-01 ENCOUNTER — CLINICAL SUPPORT (OUTPATIENT)
Dept: CARDIOLOGY | Facility: HOSPITAL | Age: 53
End: 2023-05-01
Attending: INTERNAL MEDICINE
Payer: COMMERCIAL

## 2023-05-01 DIAGNOSIS — E78.5 DYSLIPIDEMIA (HIGH LDL; LOW HDL): Chronic | ICD-10-CM

## 2023-05-01 DIAGNOSIS — I73.9 CLAUDICATION: ICD-10-CM

## 2023-05-01 LAB
CHOLEST SERPL-MCNC: 192 MG/DL (ref 120–199)
CHOLEST/HDLC SERPL: 6 {RATIO} (ref 2–5)
HDLC SERPL-MCNC: 32 MG/DL (ref 40–75)
HDLC SERPL: 16.7 % (ref 20–50)
LDLC SERPL CALC-MCNC: 123.4 MG/DL (ref 63–159)
LEFT ANT TIBIAL SYS PSV: 30 CM/S
LEFT CFA PSV: 106 CM/S
LEFT PERONEAL SYS PSV: 18 CM/S
LEFT POPLITEAL PSV: 34 CM/S
LEFT POST TIBIAL SYS PSV: 21 CM/S
LEFT PROFUNDA SYS PSV: 99 CM/S
LEFT SUPER FEMORAL DIST SYS PSV: 43 CM/S
LEFT SUPER FEMORAL MID SYS PSV: 92 CM/S
LEFT SUPER FEMORAL OSTIAL SYS PSV: 99 CM/S
LEFT SUPER FEMORAL PROX SYS PSV: 99 CM/S
LEFT TIB/PER TRUNK SYS PSV: 36 CM/S
NONHDLC SERPL-MCNC: 160 MG/DL
OHS CV LEFT LOWER EXTREMITY ABI (NO CALC): 0.73
OHS CV RIGHT ABI LOWER EXTREMITY (NO CALC): 0.82
RIGHT ANT TIBIAL SYS PSV: 61 CM/S
RIGHT CFA PSV: 246 CM/S
RIGHT PERONEAL SYS PSV: 26 CM/S
RIGHT POPLITEAL PSV: 52 CM/S
RIGHT POST TIBIAL SYS PSV: 40 CM/S
RIGHT PROFUNDA SYS PSV: 126 CM/S
RIGHT SUPER FEMORAL DIST SYS PSV: 72 CM/S
RIGHT SUPER FEMORAL MID SYS PSV: 119 CM/S
RIGHT SUPER FEMORAL OSTIAL SYS PSV: 106 CM/S
RIGHT SUPER FEMORAL PROX SYS PSV: 191 CM/S
RIGHT TIB/PER TRUNK SYS PSV: 55 CM/S
TRIGL SERPL-MCNC: 183 MG/DL (ref 30–150)

## 2023-05-01 PROCEDURE — 93925 CV US DOPPLER ARTERIAL LEGS BILATERAL (CUPID ONLY): ICD-10-PCS | Mod: 26,,, | Performed by: INTERNAL MEDICINE

## 2023-05-01 PROCEDURE — 80061 LIPID PANEL: CPT | Performed by: INTERNAL MEDICINE

## 2023-05-01 PROCEDURE — 93925 LOWER EXTREMITY STUDY: CPT | Mod: 26,,, | Performed by: INTERNAL MEDICINE

## 2023-05-01 PROCEDURE — 93925 LOWER EXTREMITY STUDY: CPT | Mod: PO

## 2023-05-01 PROCEDURE — 36415 COLL VENOUS BLD VENIPUNCTURE: CPT | Mod: PO | Performed by: INTERNAL MEDICINE

## 2023-05-02 ENCOUNTER — OFFICE VISIT (OUTPATIENT)
Dept: RHEUMATOLOGY | Facility: CLINIC | Age: 53
End: 2023-05-02
Payer: COMMERCIAL

## 2023-05-02 VITALS
SYSTOLIC BLOOD PRESSURE: 133 MMHG | HEIGHT: 65 IN | BODY MASS INDEX: 25.64 KG/M2 | WEIGHT: 153.88 LBS | DIASTOLIC BLOOD PRESSURE: 72 MMHG | HEART RATE: 63 BPM

## 2023-05-02 DIAGNOSIS — G89.29 CHRONIC MIDLINE LOW BACK PAIN WITH LEFT-SIDED SCIATICA: ICD-10-CM

## 2023-05-02 DIAGNOSIS — G89.4 CHRONIC PAIN SYNDROME: ICD-10-CM

## 2023-05-02 DIAGNOSIS — D68.61 ANTIPHOSPHOLIPID SYNDROME: ICD-10-CM

## 2023-05-02 DIAGNOSIS — I73.9 LEFT LEG CLAUDICATION: ICD-10-CM

## 2023-05-02 DIAGNOSIS — M54.42 CHRONIC MIDLINE LOW BACK PAIN WITH LEFT-SIDED SCIATICA: ICD-10-CM

## 2023-05-02 DIAGNOSIS — Z79.899 LONG-TERM USE OF PLAQUENIL: ICD-10-CM

## 2023-05-02 DIAGNOSIS — M06.09 RHEUMATOID ARTHRITIS OF MULTIPLE SITES WITH NEGATIVE RHEUMATOID FACTOR: Primary | ICD-10-CM

## 2023-05-02 PROCEDURE — 1159F MED LIST DOCD IN RCRD: CPT | Mod: CPTII,S$GLB,, | Performed by: INTERNAL MEDICINE

## 2023-05-02 PROCEDURE — 99215 OFFICE O/P EST HI 40 MIN: CPT | Mod: S$GLB,,, | Performed by: INTERNAL MEDICINE

## 2023-05-02 PROCEDURE — 3008F PR BODY MASS INDEX (BMI) DOCUMENTED: ICD-10-PCS | Mod: CPTII,S$GLB,, | Performed by: INTERNAL MEDICINE

## 2023-05-02 PROCEDURE — 3078F PR MOST RECENT DIASTOLIC BLOOD PRESSURE < 80 MM HG: ICD-10-PCS | Mod: CPTII,S$GLB,, | Performed by: INTERNAL MEDICINE

## 2023-05-02 PROCEDURE — 1160F RVW MEDS BY RX/DR IN RCRD: CPT | Mod: CPTII,S$GLB,, | Performed by: INTERNAL MEDICINE

## 2023-05-02 PROCEDURE — 1159F PR MEDICATION LIST DOCUMENTED IN MEDICAL RECORD: ICD-10-PCS | Mod: CPTII,S$GLB,, | Performed by: INTERNAL MEDICINE

## 2023-05-02 PROCEDURE — 3075F SYST BP GE 130 - 139MM HG: CPT | Mod: CPTII,S$GLB,, | Performed by: INTERNAL MEDICINE

## 2023-05-02 PROCEDURE — 99999 PR PBB SHADOW E&M-EST. PATIENT-LVL III: ICD-10-PCS | Mod: PBBFAC,,, | Performed by: INTERNAL MEDICINE

## 2023-05-02 PROCEDURE — 3078F DIAST BP <80 MM HG: CPT | Mod: CPTII,S$GLB,, | Performed by: INTERNAL MEDICINE

## 2023-05-02 PROCEDURE — 99215 PR OFFICE/OUTPT VISIT, EST, LEVL V, 40-54 MIN: ICD-10-PCS | Mod: S$GLB,,, | Performed by: INTERNAL MEDICINE

## 2023-05-02 PROCEDURE — 99999 PR PBB SHADOW E&M-EST. PATIENT-LVL III: CPT | Mod: PBBFAC,,, | Performed by: INTERNAL MEDICINE

## 2023-05-02 PROCEDURE — 3075F PR MOST RECENT SYSTOLIC BLOOD PRESS GE 130-139MM HG: ICD-10-PCS | Mod: CPTII,S$GLB,, | Performed by: INTERNAL MEDICINE

## 2023-05-02 PROCEDURE — 1160F PR REVIEW ALL MEDS BY PRESCRIBER/CLIN PHARMACIST DOCUMENTED: ICD-10-PCS | Mod: CPTII,S$GLB,, | Performed by: INTERNAL MEDICINE

## 2023-05-02 PROCEDURE — 3044F HG A1C LEVEL LT 7.0%: CPT | Mod: CPTII,S$GLB,, | Performed by: INTERNAL MEDICINE

## 2023-05-02 PROCEDURE — 3044F PR MOST RECENT HEMOGLOBIN A1C LEVEL <7.0%: ICD-10-PCS | Mod: CPTII,S$GLB,, | Performed by: INTERNAL MEDICINE

## 2023-05-02 PROCEDURE — 3008F BODY MASS INDEX DOCD: CPT | Mod: CPTII,S$GLB,, | Performed by: INTERNAL MEDICINE

## 2023-05-02 RX ORDER — METHOTREXATE 25 MG/ML
12.5 INJECTION, SOLUTION INTRA-ARTERIAL; INTRAMUSCULAR; INTRAVENOUS
Qty: 2 ML | Refills: 3 | Status: SHIPPED | OUTPATIENT
Start: 2023-05-02 | End: 2023-08-10 | Stop reason: SDUPTHER

## 2023-05-02 RX ORDER — METHOTREXATE 2.5 MG/1
10 TABLET ORAL
Qty: 16 TABLET | Refills: 2 | Status: SHIPPED | OUTPATIENT
Start: 2023-05-02 | End: 2023-05-02

## 2023-05-02 RX ORDER — FOLIC ACID 1 MG/1
1 TABLET ORAL DAILY
Qty: 90 TABLET | Refills: 2 | Status: SHIPPED | OUTPATIENT
Start: 2023-05-02 | End: 2024-01-28

## 2023-05-02 NOTE — PROGRESS NOTES
RHEUMATOLOGY CLINIC  VISIT    Chief complaints, HPI, ROS, EXAM, Assessment & Plans:-  Ruma Nguyenkelle a 52 y.o. pleasant female comes in with longstanding history of seronegative rheumatoid arthritis and antiphospholipid antibody syndrome treated by external rheumatologist with Plaquenil and methotrexate.   Longstanding fibromyalgia treated with tramadol.  Complains of worsening lower back pain associated with left-sided sciatica.Worsening leg claudication- BRYAN showed significant stenosis of right leg.   No weakness.  No bladder or bowel incontinence.  History of amaurosis fugax.  Severe cardiovascular disease with calcification.  Aortic insufficiency managed by cardiologist.  No photosensitive rash.  No sicca syndrome.  No Raynaud's phenomenon.  No history of purpura.  Rheumatological review of system negative otherwise.  Physical examination shows no significant synovitis of small joints.  No dactylitis or enthesitis.  Nail fold capillaries normal.  No sclerodactyly or telangiectasia.  1. Rheumatoid arthritis of multiple sites with negative rheumatoid factor    2. Antiphospholipid syndrome    3. Long-term use of Plaquenil    4. Chronic pain syndrome    5. Chronic midline low back pain with left-sided sciatica    6. Left leg claudication      Problem List Items Addressed This Visit       Antiphospholipid syndrome    Relevant Medications    folic acid (FOLVITE) 1 MG tablet    methotrexate 25 mg/mL injection    Rheumatoid arthritis of multiple sites with negative rheumatoid factor - Primary    Relevant Medications    folic acid (FOLVITE) 1 MG tablet    methotrexate 25 mg/mL injection    Long-term use of Plaquenil    Chronic midline low back pain with left-sided sciatica    Chronic pain syndrome    Left leg claudication      Latest Reference Range & Units 02/24/23 15:50   WBC 3.90 - 12.70 K/uL 5.81   RBC 4.00 - 5.40 M/uL 4.37   Hemoglobin 12.0 - 16.0 g/dL 15.1   Hematocrit 37.0 - 48.5 % 43.0   MCV 82 - 98 fL 98    MCH 27.0 - 31.0 pg 34.6 (H)   MCHC 32.0 - 36.0 g/dL 35.1   RDW 11.5 - 14.5 % 14.0   Platelets 150 - 450 K/uL 243   MPV 9.2 - 12.9 fL 9.3   Gran % 38.0 - 73.0 % 48.0   Lymph % 18.0 - 48.0 % 43.7   Mono % 4.0 - 15.0 % 3.4 (L)   Eosinophil % 0.0 - 8.0 % 3.3   Basophil % 0.0 - 1.9 % 1.4   Immature Granulocytes 0.0 - 0.5 % 0.2   Gran # (ANC) 1.8 - 7.7 K/uL 2.8   Lymph # 1.0 - 4.8 K/uL 2.5   Mono # 0.3 - 1.0 K/uL 0.2 (L)   Eos # 0.0 - 0.5 K/uL 0.2   Baso # 0.00 - 0.20 K/uL 0.08   Immature Grans (Abs) 0.00 - 0.04 K/uL 0.01   nRBC 0 /100 WBC 0   Differential Method  Automated   Sed Rate 0 - 36 mm/Hr 9   Sodium 136 - 145 mmol/L 142   Potassium 3.5 - 5.1 mmol/L 3.7   Chloride 95 - 110 mmol/L 106   CO2 23 - 29 mmol/L 24   Anion Gap 8 - 16 mmol/L 12   BUN 6 - 20 mg/dL 6   Creatinine 0.5 - 1.4 mg/dL 0.8   eGFR >60 mL/min/1.73 m^2 >60   Glucose 70 - 110 mg/dL 84   Calcium 8.7 - 10.5 mg/dL 9.1   Alkaline Phosphatase 55 - 135 U/L 67   PROTEIN TOTAL 6.0 - 8.4 g/dL 7.3   Albumin 3.5 - 5.2 g/dL 3.9   BILIRUBIN TOTAL 0.1 - 1.0 mg/dL 0.5   AST 10 - 40 U/L 22   ALT 10 - 44 U/L 18   CRP 0.0 - 8.2 mg/L 1.9   HERNAN Screen Negative <1:80  Negative <1:80   Complement (C-3) 50 - 180 mg/dL 94   Complement (C-4) 11 - 44 mg/dL 3 (L)   Cryoglobulin, Qual Absent  Absent   (H): Data is abnormally high  (L): Data is abnormally low      Severe seronegative rheumatoid arthritis with worsening large joint pain on Plaquenil. Restart methotrexate.  Check labs as ordered today.  Continue 325 mg aspirin daily for APL antibody syndrome.   Tramadol to treat fibromyalgia and chronic pain syndrome.  Advised all precautions.  X-ray lumbar spine and consult back and spine clinic for lumbar facet arthropathy and degenerative disc disease.  Compromised immune system secondary to autoimmune disease and use of immunosuppressive drugs. Monitor carefully for infections. Advised to get immediate medical care if any infection. Also advised strict adherence to age  appropriate vaccinations and cancer screenings with PCP.     # Follow up in about 6 months (around 2023).      Past Medical History:   Diagnosis Date    Antiphospholipid syndrome     Cancer     skin, melanoma    Colon polyps     Heart murmur     Hyperlipidemia     RA (rheumatoid arthritis)     Radiculopathy     Rheumatoid arthritis of multiple sites without rheumatoid factor        Past Surgical History:   Procedure Laterality Date    ADENOIDECTOMY      BREAST SURGERY  2006    Augmentation     SECTION      COLONOSCOPY N/A 2020    Procedure: COLONOSCOPY;  Surgeon: Lalo De Leon MD;  Location: [facility];  Service: Endoscopy;  Laterality: N/A;    HERNIA REPAIR      x2    POLYPECTOMY      TUBAL LIGATION      TUBAL LIGATION Bilateral         Social History     Tobacco Use    Smoking status: Every Day    Smokeless tobacco: Never   Substance Use Topics    Alcohol use: No    Drug use: No       Family History   Problem Relation Age of Onset    Cancer Father     Colon cancer Father     Heart disease Mother     Hypertension Mother     No Known Problems Sister     Diabetes Brother     Kidney cancer Brother        Review of patient's allergies indicates:  No Known Allergies    Medication List with Changes/Refills   New Medications    FOLIC ACID (FOLVITE) 1 MG TABLET    Take 1 tablet (1 mg total) by mouth once daily.    METHOTREXATE 25 MG/ML INJECTION    Inject 0.6 mLs (15 mg total) into the muscle every 7 days.   Current Medications    AMLODIPINE (NORVASC) 5 MG TABLET    Take 1 tablet (5 mg total) by mouth every evening.    ASPIRIN (ECOTRIN) 81 MG EC TABLET    Take 324 mg by mouth once daily.    CLONAZEPAM (KLONOPIN) 1 MG TABLET    Take 1 tablet (1 mg total) by mouth daily as needed for Anxiety.    HYDROXYCHLOROQUINE (PLAQUENIL) 200 MG TABLET    Take 1 tablet (200 mg total) by mouth 2 (two) times daily.    TRAMADOL (ULTRAM) 50 MG TABLET    Take 1 tablet (50 mg total) by mouth every 24 hours as needed  for Pain.   Discontinued Medications    AZATHIOPRINE (IMURAN) 50 MG TAB    TAKE 1 TABLET BY MOUTH EVERY DAY    ESCITALOPRAM OXALATE (LEXAPRO) 20 MG TABLET    Take 20 mg by mouth once daily.         Thank you for allowing me to participate in the care ofRuma Nguyen.    Disclaimer: This note was prepared using voice recognition system and is likely to have sound alike errors and is not proof read.  Please call me with any questions.

## 2023-05-03 ENCOUNTER — OFFICE VISIT (OUTPATIENT)
Dept: PAIN MEDICINE | Facility: CLINIC | Age: 53
End: 2023-05-03
Payer: COMMERCIAL

## 2023-05-03 VITALS
SYSTOLIC BLOOD PRESSURE: 121 MMHG | HEIGHT: 65 IN | HEART RATE: 71 BPM | WEIGHT: 153.31 LBS | BODY MASS INDEX: 25.54 KG/M2 | DIASTOLIC BLOOD PRESSURE: 75 MMHG

## 2023-05-03 DIAGNOSIS — R29.2 HYPER-REFLEXIA: ICD-10-CM

## 2023-05-03 DIAGNOSIS — M54.16 LUMBAR RADICULOPATHY: ICD-10-CM

## 2023-05-03 DIAGNOSIS — M54.42 CHRONIC BILATERAL LOW BACK PAIN WITH LEFT-SIDED SCIATICA: Primary | ICD-10-CM

## 2023-05-03 DIAGNOSIS — G89.29 CHRONIC BILATERAL LOW BACK PAIN WITH LEFT-SIDED SCIATICA: Primary | ICD-10-CM

## 2023-05-03 DIAGNOSIS — M53.3 PAIN IN THE COCCYX: ICD-10-CM

## 2023-05-03 PROCEDURE — 3008F PR BODY MASS INDEX (BMI) DOCUMENTED: ICD-10-PCS | Mod: CPTII,S$GLB,, | Performed by: PHYSICIAN ASSISTANT

## 2023-05-03 PROCEDURE — 3044F HG A1C LEVEL LT 7.0%: CPT | Mod: CPTII,S$GLB,, | Performed by: PHYSICIAN ASSISTANT

## 2023-05-03 PROCEDURE — 3074F PR MOST RECENT SYSTOLIC BLOOD PRESSURE < 130 MM HG: ICD-10-PCS | Mod: CPTII,S$GLB,, | Performed by: PHYSICIAN ASSISTANT

## 2023-05-03 PROCEDURE — 1159F PR MEDICATION LIST DOCUMENTED IN MEDICAL RECORD: ICD-10-PCS | Mod: CPTII,S$GLB,, | Performed by: PHYSICIAN ASSISTANT

## 2023-05-03 PROCEDURE — 1159F MED LIST DOCD IN RCRD: CPT | Mod: CPTII,S$GLB,, | Performed by: PHYSICIAN ASSISTANT

## 2023-05-03 PROCEDURE — 3008F BODY MASS INDEX DOCD: CPT | Mod: CPTII,S$GLB,, | Performed by: PHYSICIAN ASSISTANT

## 2023-05-03 PROCEDURE — 99999 PR PBB SHADOW E&M-EST. PATIENT-LVL IV: ICD-10-PCS | Mod: PBBFAC,,, | Performed by: PHYSICIAN ASSISTANT

## 2023-05-03 PROCEDURE — 99203 PR OFFICE/OUTPT VISIT, NEW, LEVL III, 30-44 MIN: ICD-10-PCS | Mod: S$GLB,,, | Performed by: PHYSICIAN ASSISTANT

## 2023-05-03 PROCEDURE — 3078F PR MOST RECENT DIASTOLIC BLOOD PRESSURE < 80 MM HG: ICD-10-PCS | Mod: CPTII,S$GLB,, | Performed by: PHYSICIAN ASSISTANT

## 2023-05-03 PROCEDURE — 3078F DIAST BP <80 MM HG: CPT | Mod: CPTII,S$GLB,, | Performed by: PHYSICIAN ASSISTANT

## 2023-05-03 PROCEDURE — 99999 PR PBB SHADOW E&M-EST. PATIENT-LVL IV: CPT | Mod: PBBFAC,,, | Performed by: PHYSICIAN ASSISTANT

## 2023-05-03 PROCEDURE — 3074F SYST BP LT 130 MM HG: CPT | Mod: CPTII,S$GLB,, | Performed by: PHYSICIAN ASSISTANT

## 2023-05-03 PROCEDURE — 1160F RVW MEDS BY RX/DR IN RCRD: CPT | Mod: CPTII,S$GLB,, | Performed by: PHYSICIAN ASSISTANT

## 2023-05-03 PROCEDURE — 99203 OFFICE O/P NEW LOW 30 MIN: CPT | Mod: S$GLB,,, | Performed by: PHYSICIAN ASSISTANT

## 2023-05-03 PROCEDURE — 1160F PR REVIEW ALL MEDS BY PRESCRIBER/CLIN PHARMACIST DOCUMENTED: ICD-10-PCS | Mod: CPTII,S$GLB,, | Performed by: PHYSICIAN ASSISTANT

## 2023-05-03 PROCEDURE — 3044F PR MOST RECENT HEMOGLOBIN A1C LEVEL <7.0%: ICD-10-PCS | Mod: CPTII,S$GLB,, | Performed by: PHYSICIAN ASSISTANT

## 2023-05-04 NOTE — PROGRESS NOTES
[facility] New Patient Evaluation      Referred by: Dr. Jesus Bardales*    PCP:     CC:   Chief Complaint   Patient presents with    Low-back Pain          HPI:   Ruma Nguyen is a 52 y.o. year old female smoker patient who has a past medical history of Antiphospholipid syndrome, Cancer, Colon polyps, Heart murmur, Hyperlipidemia, RA (rheumatoid arthritis), Radiculopathy, and Rheumatoid arthritis of multiple sites without rheumatoid factor. She presents in referral from Dr. Jesus Bardales* for lower back pain.  She has abotu 10 years chronic pain across the lower back with radiation to the left hip and entire leg in a generalized distribution throughout.  Her legs feel fatigued with walking; seeing cardiology who did find 50% stenosis right common femoral artery and no arterial stenosis on the left.  She has focal coccyx and sacrum pain that she relates back to a fall years ago.    Overall level of pain in the back/ left leg has increased in the last 2 weeks.  She has tried a heating pad, tramadol.  No other treatments    Denies bowel/ bladder incontinence.    Past and current medications:  Antineuropathics:  NSAIDs:    Antidepressants:  Muscle relaxers:  Opioids:  tramadol  Antiplatelets/Anticoagulants:  ASA    Physical Therapy/ Chiropractic care:  none    Pain Intervention History:  none    Past Spine Surgical History:  none        History:    Current Outpatient Medications:     amLODIPine (NORVASC) 5 MG tablet, Take 1 tablet (5 mg total) by mouth every evening., Disp: 90 tablet, Rfl: 1    aspirin (ECOTRIN) 81 MG EC tablet, Take 324 mg by mouth once daily., Disp: , Rfl:     clonazePAM (KLONOPIN) 1 MG tablet, Take 1 tablet (1 mg total) by mouth daily as needed for Anxiety. (Patient taking differently: Take 1 mg by mouth 2 (two) times daily as needed for Anxiety.), Disp: 30 tablet, Rfl: 1    folic acid (FOLVITE) 1 MG tablet, Take 1 tablet (1 mg total) by mouth once daily., Disp: 90 tablet,  Rfl: 2    hydrOXYchloroQUINE (PLAQUENIL) 200 mg tablet, Take 1 tablet (200 mg total) by mouth 2 (two) times daily., Disp: 180 tablet, Rfl: 1    methotrexate 25 mg/mL injection, Inject 0.6 mLs (15 mg total) into the muscle every 7 days., Disp: 2 mL, Rfl: 3    traMADoL (ULTRAM) 50 mg tablet, Take 1 tablet (50 mg total) by mouth every 24 hours as needed for Pain., Disp: 30 tablet, Rfl: 2    Past Medical History:   Diagnosis Date    Antiphospholipid syndrome     Cancer     skin, melanoma    Colon polyps     Heart murmur     Hyperlipidemia     RA (rheumatoid arthritis)     Radiculopathy     Rheumatoid arthritis of multiple sites without rheumatoid factor        Past Surgical History:   Procedure Laterality Date    ADENOIDECTOMY      BREAST SURGERY      Augmentation     SECTION      COLONOSCOPY N/A 2020    Procedure: COLONOSCOPY;  Surgeon: Lalo De Leon MD;  Location: [facility];  Service: Endoscopy;  Laterality: N/A;    HERNIA REPAIR      x2    POLYPECTOMY      TUBAL LIGATION      TUBAL LIGATION Bilateral        Family History   Problem Relation Age of Onset    Cancer Father     Colon cancer Father     Heart disease Mother     Hypertension Mother     No Known Problems Sister     Diabetes Brother     Kidney cancer Brother        Social History     Socioeconomic History    Marital status:    Tobacco Use    Smoking status: Every Day    Smokeless tobacco: Never   Substance and Sexual Activity    Alcohol use: No    Drug use: No    Sexual activity: Yes     Partners: Male       Review of patient's allergies indicates:  No Known Allergies    Labs:  Lab Results   Component Value Date    HGBA1C 5.0 2023       Lab Results   Component Value Date    WBC 5.81 2023    HGB 15.1 2023    HCT 43.0 2023    MCV 98 2023     2023           Review of Systems:  Low back pain.  Coccyx pain, legs fatidue, left leg pain.  Balance of review of systems is  "negative.    Physical Exam:  Vitals:    05/03/23 1051   BP: 121/75   Pulse: 71   Weight: 69.6 kg (153 lb 5.3 oz)   Height: 5' 5" (1.651 m)   PainSc:   5   PainLoc: Back     Body mass index is 25.52 kg/m².    Gen: NAD  Psych: mood appropriate for given condition  HEENT: eyes anicteric   CV: RRR, 2+ radial pulse  HEENT: anicteric   Respiratory: non-labored, no signs of respiratory distress  Abd: non-distended  Skin: warm, dry and intact.  Gait: Able to heel walk, toe walk. No antalgic gait.     Coordination:   Romberg: negative  Finger to nose coordination: normal  Heel to shin coordination: normal  Tandem walking coordination: normal    Cervical spine: ROM is full in flexion, extension and lateral rotation without increased pain.  Spurling's maneuver causes no neck pain to either side.  Myofascial exam: No Tenderness to palpation across cervical paraspinous region bilaterally.    Lumbar spine:  Lumbar spine: ROM is full with flexion extension and oblique extension with no increased pain.    Jesus Manuel's test causes no increased pain on either side.    Supine straight leg raise is negative bilaterally.    Internal and external rotation of the hip causes no increased pain on either side.  Myofascial exam: No tenderness to palpation across lumbar paraspinous muscles. No tenderness to palpation over the bilateral greater trochanters and bilateral SI joint    Sensory:  Intact and symmetrical to light touch in C4-T1 dermatomes bilaterally. Intact and symmetrical to light touch in L1-S1 dermatomes bilaterally.    Motor:    Right Left   C4 Shoulder Abduction  5  5   C5 Elbow Flexion    5  5   C6 Wrist Extension  5  5   C7 Elbow Extension   5  5   C8/T1 Hand Intrinsics   5  5        Right Left   L2/3 Iliacus Hip flexion  5  5   L3/4 Qudratus Femoris Knee Extension  5  5   L4/5 Tib Anterior Ankle Dorsiflexion   5  5   L5/S1 Extensor Hallicus Longus Great toe extension  5  5   S1/S2 Gastroc/Soleus Plantar Flexion  5  5      Right " Left   Triceps DTR 3+ 3+   Biceps DTR 3+ 3+   Brachioradialis DTR 3+ 3+   Patellar DTR 3+ 3+   Achilles DTR 3+ 3+   Pritchard Presnt  Present   Clonus Present Present   Babinski Absent Absent       Imaging:  Xray lumbar spine 1-11-23:  degenerative changes at L5/S1 with vacumm disc changes.      Assessment:   Ruma Nguyen is a 52 y.o. year old female patient who has a past medical history of Antiphospholipid syndrome, Cancer, Colon polyps, Heart murmur, Hyperlipidemia, RA (rheumatoid arthritis), Radiculopathy, and Rheumatoid arthritis of multiple sites without rheumatoid factor. She presents in referral from Dr. Jesus Bardales* for chronic lower back pain with left leg radicular pain in a generalized distribution, no focal dermatomal districution.  No lumbar neurological defcitis.  Possible myofascial pain vs true lumbar radiculopathy.  No left sided arterial claudication.  She has exam findings conserning for myelopathy - hyper reflexia, hoffmans.  Coccyx pain    Problem List Items Addressed This Visit    None  Visit Diagnoses       Chronic bilateral low back pain with left-sided sciatica    -  Primary    Relevant Orders    MRI Lumbar Spine Without Contrast    Lumbar radiculopathy        Relevant Orders    MRI Lumbar Spine Without Contrast    Pain in the coccyx        Relevant Orders    X-Ray Sacrum And Coccyx    Hyper-reflexia        Relevant Orders    MRI Cervical Spine Without Contrast            Plan:  - to help determine cause of back and left leg pain - obtain MRI lumbar spine to further assess  -   coccyx xray - rule out prior fracture/ injury as she is highly concerned she fractured the area in a fall years ago  - mri cervical spine - rule out cervical cord compression given myelopathic exam findings.  - follow up in clinic after iamgingn to further assess/ review.      Follow Up: RTC after imaging complete.      : Reviewed and consistent with medication use as prescribed.    Thank you for  referring this interesting patient, and I look forward to continuing to collaborate in her care.        Miranda Davis PA-C  [facility]

## 2023-05-12 DIAGNOSIS — M06.09 RHEUMATOID ARTHRITIS OF MULTIPLE SITES WITH NEGATIVE RHEUMATOID FACTOR: ICD-10-CM

## 2023-05-12 DIAGNOSIS — M54.42 CHRONIC MIDLINE LOW BACK PAIN WITH LEFT-SIDED SCIATICA: ICD-10-CM

## 2023-05-12 DIAGNOSIS — G89.4 CHRONIC PAIN SYNDROME: ICD-10-CM

## 2023-05-12 DIAGNOSIS — G89.29 CHRONIC MIDLINE LOW BACK PAIN WITH LEFT-SIDED SCIATICA: ICD-10-CM

## 2023-05-12 RX ORDER — TRAMADOL HYDROCHLORIDE 50 MG/1
50 TABLET ORAL
Qty: 30 TABLET | Refills: 2 | Status: SHIPPED | OUTPATIENT
Start: 2023-05-12 | End: 2023-12-22

## 2023-05-18 ENCOUNTER — PATIENT MESSAGE (OUTPATIENT)
Dept: CARDIOLOGY | Facility: HOSPITAL | Age: 53
End: 2023-05-18
Payer: COMMERCIAL

## 2023-05-19 ENCOUNTER — HOSPITAL ENCOUNTER (OUTPATIENT)
Dept: RADIOLOGY | Facility: HOSPITAL | Age: 53
Discharge: HOME | End: 2023-05-19
Attending: INTERNAL MEDICINE
Payer: COMMERCIAL

## 2023-05-19 ENCOUNTER — CLINICAL SUPPORT (OUTPATIENT)
Dept: CARDIOLOGY | Facility: HOSPITAL | Age: 53
End: 2023-05-19
Attending: INTERNAL MEDICINE
Payer: COMMERCIAL

## 2023-05-19 VITALS — BODY MASS INDEX: 25.49 KG/M2 | WEIGHT: 153 LBS | HEIGHT: 65 IN

## 2023-05-19 DIAGNOSIS — I35.0 NONRHEUMATIC AORTIC VALVE STENOSIS: Chronic | ICD-10-CM

## 2023-05-19 DIAGNOSIS — R06.02 SOB (SHORTNESS OF BREATH): ICD-10-CM

## 2023-05-19 DIAGNOSIS — I70.0 ATHEROSCLEROSIS OF ABDOMINAL AORTA: Chronic | ICD-10-CM

## 2023-05-19 LAB
CV PHARM DOSE: 0.4 MG
CV STRESS BASE HR: 46 BPM
DIASTOLIC BLOOD PRESSURE: 63 MMHG
NUC REST EJECTION FRACTION: 72
OHS CV CPX 1 MINUTE RECOVERY HEART RATE: 88 BPM
OHS CV CPX 85 PERCENT MAX PREDICTED HEART RATE MALE: 137
OHS CV CPX MAX PREDICTED HEART RATE: 161
OHS CV CPX PATIENT IS FEMALE: 1
OHS CV CPX PATIENT IS MALE: 0
OHS CV CPX PEAK DIASTOLIC BLOOD PRESSURE: 65 MMHG
OHS CV CPX PEAK HEAR RATE: 90 BPM
OHS CV CPX PEAK RATE PRESSURE PRODUCT: NORMAL
OHS CV CPX PEAK SYSTOLIC BLOOD PRESSURE: 146 MMHG
OHS CV CPX PERCENT MAX PREDICTED HEART RATE ACHIEVED: 56
OHS CV CPX RATE PRESSURE PRODUCT PRESENTING: 5888
OHS CV PHARM TIME: 927 MIN
SYSTOLIC BLOOD PRESSURE: 128 MMHG

## 2023-05-19 PROCEDURE — 93016 CV STRESS TEST SUPVJ ONLY: CPT | Mod: ,,, | Performed by: INTERNAL MEDICINE

## 2023-05-19 PROCEDURE — 78452 NUCLEAR STRESS - CARDIOLOGY INTERPRETED (CUPID ONLY): ICD-10-PCS | Mod: 26,,, | Performed by: INTERNAL MEDICINE

## 2023-05-19 PROCEDURE — 63600175 PHARM REV CODE 636 W HCPCS: Mod: PO | Performed by: INTERNAL MEDICINE

## 2023-05-19 PROCEDURE — 93018 CV STRESS TEST I&R ONLY: CPT | Mod: ,,, | Performed by: INTERNAL MEDICINE

## 2023-05-19 PROCEDURE — 78452 HT MUSCLE IMAGE SPECT MULT: CPT | Mod: 26,,, | Performed by: INTERNAL MEDICINE

## 2023-05-19 PROCEDURE — 93017 CV STRESS TEST TRACING ONLY: CPT | Mod: PO

## 2023-05-19 PROCEDURE — 78452 HT MUSCLE IMAGE SPECT MULT: CPT | Mod: PO

## 2023-05-19 PROCEDURE — A9502 TC99M TETROFOSMIN: HCPCS | Mod: PO

## 2023-05-19 PROCEDURE — 93018 PR CARDIAC STRESS TST,INTERP/REPT ONLY: ICD-10-PCS | Mod: ,,, | Performed by: INTERNAL MEDICINE

## 2023-05-19 PROCEDURE — 93016 NUCLEAR STRESS - CARDIOLOGY INTERPRETED (CUPID ONLY): ICD-10-PCS | Mod: ,,, | Performed by: INTERNAL MEDICINE

## 2023-05-19 RX ORDER — REGADENOSON 0.08 MG/ML
0.4 INJECTION, SOLUTION INTRAVENOUS
Status: COMPLETED | OUTPATIENT
Start: 2023-05-19 | End: 2023-05-19

## 2023-05-19 RX ADMIN — REGADENOSON 0.4 MG: 0.08 INJECTION, SOLUTION INTRAVENOUS at 09:05

## 2023-06-02 ENCOUNTER — HOSPITAL ENCOUNTER (OUTPATIENT)
Dept: RADIOLOGY | Facility: HOSPITAL | Age: 53
Discharge: HOME | End: 2023-06-02
Attending: PHYSICIAN ASSISTANT
Payer: COMMERCIAL

## 2023-06-02 DIAGNOSIS — M53.3 PAIN IN THE COCCYX: ICD-10-CM

## 2023-06-02 PROCEDURE — 72220 XR SACRUM AND COCCYX: ICD-10-PCS | Mod: 26,,, | Performed by: RADIOLOGY

## 2023-06-02 PROCEDURE — 72220 X-RAY EXAM SACRUM TAILBONE: CPT | Mod: 26,,, | Performed by: RADIOLOGY

## 2023-06-02 PROCEDURE — 72220 X-RAY EXAM SACRUM TAILBONE: CPT | Mod: TC,FY,PO

## 2023-06-05 ENCOUNTER — HOSPITAL ENCOUNTER (OUTPATIENT)
Dept: RADIOLOGY | Facility: HOSPITAL | Age: 53
Discharge: HOME | End: 2023-06-05
Attending: PHYSICIAN ASSISTANT
Payer: COMMERCIAL

## 2023-06-05 ENCOUNTER — TELEPHONE (OUTPATIENT)
Dept: PAIN MEDICINE | Facility: CLINIC | Age: 53
End: 2023-06-05
Payer: COMMERCIAL

## 2023-06-05 DIAGNOSIS — G89.29 CHRONIC BILATERAL LOW BACK PAIN WITH LEFT-SIDED SCIATICA: ICD-10-CM

## 2023-06-05 DIAGNOSIS — M54.16 LUMBAR RADICULOPATHY: ICD-10-CM

## 2023-06-05 DIAGNOSIS — R29.2 HYPER-REFLEXIA: ICD-10-CM

## 2023-06-05 DIAGNOSIS — M54.42 CHRONIC BILATERAL LOW BACK PAIN WITH LEFT-SIDED SCIATICA: ICD-10-CM

## 2023-06-05 PROCEDURE — 72148 MRI LUMBAR SPINE WITHOUT CONTRAST: ICD-10-PCS | Mod: 26,,, | Performed by: RADIOLOGY

## 2023-06-05 PROCEDURE — 72148 MRI LUMBAR SPINE W/O DYE: CPT | Mod: 26,,, | Performed by: RADIOLOGY

## 2023-06-05 PROCEDURE — 72141 MRI CERVICAL SPINE WITHOUT CONTRAST: ICD-10-PCS | Mod: 26,,, | Performed by: RADIOLOGY

## 2023-06-05 PROCEDURE — 72141 MRI NECK SPINE W/O DYE: CPT | Mod: 26,,, | Performed by: RADIOLOGY

## 2023-06-05 PROCEDURE — 72141 MRI NECK SPINE W/O DYE: CPT | Mod: TC,PO

## 2023-06-05 PROCEDURE — 72148 MRI LUMBAR SPINE W/O DYE: CPT | Mod: TC,PO

## 2023-06-05 NOTE — TELEPHONE ENCOUNTER
----- Message from Mrianda Davis PA-C sent at 6/5/2023 12:23 PM CDT -----  Results Reviewed.  Please call with below:    All imaging complete.  Please schedule appt to discuss all results.

## 2023-06-06 DIAGNOSIS — R94.39 ABNORMAL NUCLEAR STRESS TEST: Primary | ICD-10-CM

## 2023-06-06 RX ORDER — SODIUM CHLORIDE 9 MG/ML
INJECTION, SOLUTION INTRAVENOUS ONCE
Status: CANCELLED | OUTPATIENT
Start: 2023-06-06 | End: 2023-06-06

## 2023-06-06 RX ORDER — SODIUM CHLORIDE 0.9 % (FLUSH) 0.9 %
10 SYRINGE (ML) INJECTION
Status: DISCONTINUED | OUTPATIENT
Start: 2023-06-06 | End: 2023-07-25

## 2023-06-09 ENCOUNTER — TELEPHONE (OUTPATIENT)
Dept: CARDIOLOGY | Facility: CLINIC | Age: 53
End: 2023-06-09
Payer: COMMERCIAL

## 2023-06-09 NOTE — TELEPHONE ENCOUNTER
----- Message from Peggy Bonds sent at 6/9/2023  9:47 AM CDT -----  Regarding: pt call back  Name of Who is Calling: TRANG SOUZA [[id number]]        What is the request in detail: pt would like a call back to schedule cancelled appt, I am unable to schedule, please advise.         Can the clinic reply by [facility]: no           What Number to Call Back if not in [facility]: [phone]

## 2023-06-12 ENCOUNTER — OFFICE VISIT (OUTPATIENT)
Dept: PAIN MEDICINE | Facility: CLINIC | Age: 53
End: 2023-06-12
Payer: COMMERCIAL

## 2023-06-12 ENCOUNTER — TELEPHONE (OUTPATIENT)
Dept: PAIN MEDICINE | Facility: CLINIC | Age: 53
End: 2023-06-12

## 2023-06-12 VITALS
HEART RATE: 67 BPM | DIASTOLIC BLOOD PRESSURE: 70 MMHG | WEIGHT: 153 LBS | BODY MASS INDEX: 25.49 KG/M2 | SYSTOLIC BLOOD PRESSURE: 109 MMHG | HEIGHT: 65 IN

## 2023-06-12 DIAGNOSIS — M54.42 CHRONIC BILATERAL LOW BACK PAIN WITH LEFT-SIDED SCIATICA: Primary | ICD-10-CM

## 2023-06-12 DIAGNOSIS — M47.816 LUMBAR SPONDYLOSIS: ICD-10-CM

## 2023-06-12 DIAGNOSIS — G89.29 CHRONIC BILATERAL LOW BACK PAIN WITH LEFT-SIDED SCIATICA: Primary | ICD-10-CM

## 2023-06-12 DIAGNOSIS — M53.3 PAIN IN THE COCCYX: ICD-10-CM

## 2023-06-12 DIAGNOSIS — E04.2 MULTIPLE THYROID NODULES: ICD-10-CM

## 2023-06-12 PROCEDURE — 1160F RVW MEDS BY RX/DR IN RCRD: CPT | Mod: CPTII,S$GLB,, | Performed by: PHYSICIAN ASSISTANT

## 2023-06-12 PROCEDURE — 1160F PR REVIEW ALL MEDS BY PRESCRIBER/CLIN PHARMACIST DOCUMENTED: ICD-10-PCS | Mod: CPTII,S$GLB,, | Performed by: PHYSICIAN ASSISTANT

## 2023-06-12 PROCEDURE — 3044F PR MOST RECENT HEMOGLOBIN A1C LEVEL <7.0%: ICD-10-PCS | Mod: CPTII,S$GLB,, | Performed by: PHYSICIAN ASSISTANT

## 2023-06-12 PROCEDURE — 3074F PR MOST RECENT SYSTOLIC BLOOD PRESSURE < 130 MM HG: ICD-10-PCS | Mod: CPTII,S$GLB,, | Performed by: PHYSICIAN ASSISTANT

## 2023-06-12 PROCEDURE — 99214 PR OFFICE/OUTPT VISIT, EST, LEVL IV, 30-39 MIN: ICD-10-PCS | Mod: S$GLB,,, | Performed by: PHYSICIAN ASSISTANT

## 2023-06-12 PROCEDURE — 99214 OFFICE O/P EST MOD 30 MIN: CPT | Mod: S$GLB,,, | Performed by: PHYSICIAN ASSISTANT

## 2023-06-12 PROCEDURE — 1159F MED LIST DOCD IN RCRD: CPT | Mod: CPTII,S$GLB,, | Performed by: PHYSICIAN ASSISTANT

## 2023-06-12 PROCEDURE — 3074F SYST BP LT 130 MM HG: CPT | Mod: CPTII,S$GLB,, | Performed by: PHYSICIAN ASSISTANT

## 2023-06-12 PROCEDURE — 3008F PR BODY MASS INDEX (BMI) DOCUMENTED: ICD-10-PCS | Mod: CPTII,S$GLB,, | Performed by: PHYSICIAN ASSISTANT

## 2023-06-12 PROCEDURE — 3008F BODY MASS INDEX DOCD: CPT | Mod: CPTII,S$GLB,, | Performed by: PHYSICIAN ASSISTANT

## 2023-06-12 PROCEDURE — 99999 PR PBB SHADOW E&M-EST. PATIENT-LVL III: CPT | Mod: PBBFAC,,, | Performed by: PHYSICIAN ASSISTANT

## 2023-06-12 PROCEDURE — 99999 PR PBB SHADOW E&M-EST. PATIENT-LVL III: ICD-10-PCS | Mod: PBBFAC,,, | Performed by: PHYSICIAN ASSISTANT

## 2023-06-12 PROCEDURE — 1159F PR MEDICATION LIST DOCUMENTED IN MEDICAL RECORD: ICD-10-PCS | Mod: CPTII,S$GLB,, | Performed by: PHYSICIAN ASSISTANT

## 2023-06-12 PROCEDURE — 3078F DIAST BP <80 MM HG: CPT | Mod: CPTII,S$GLB,, | Performed by: PHYSICIAN ASSISTANT

## 2023-06-12 PROCEDURE — 3078F PR MOST RECENT DIASTOLIC BLOOD PRESSURE < 80 MM HG: ICD-10-PCS | Mod: CPTII,S$GLB,, | Performed by: PHYSICIAN ASSISTANT

## 2023-06-12 PROCEDURE — 3044F HG A1C LEVEL LT 7.0%: CPT | Mod: CPTII,S$GLB,, | Performed by: PHYSICIAN ASSISTANT

## 2023-06-12 RX ORDER — SERTRALINE HYDROCHLORIDE 25 MG/1
25 TABLET, FILM COATED ORAL NIGHTLY
COMMUNITY
Start: 2023-05-08 | End: 2023-09-11

## 2023-06-12 NOTE — PROGRESS NOTES
Subjective:    Patient ID:  Ruma Nguyen is a 52 y.o. female who presents for Mild mitral and aortic regurgitation        HPI  DISCUSSED TESTS MILD TO MODERATE PVD BRYAN 0.82 ON THE RIGHT 0.73 ON THE LEFT, ABNORMAL NUCLEAR STRESS TEST WITH 2 REVERSIBLE DEFECT, RECENT LABS CMP AND CRP OK,  UP FROM 74, HDL 32 TRIGLYCERIDE 183, DECREASED TOBACCO, THYROID NODULES, ALSO HAD BACK MRI, SEE ROS    Past Medical History:   Diagnosis Date    Antiphospholipid syndrome     Cancer     skin, melanoma    Colon polyps     Heart murmur     Hyperlipidemia     RA (rheumatoid arthritis)     Radiculopathy     Rheumatoid arthritis of multiple sites without rheumatoid factor      Past Surgical History:   Procedure Laterality Date    ADENOIDECTOMY      BREAST SURGERY  2006    Augmentation     SECTION      COLONOSCOPY N/A 2020    Procedure: COLONOSCOPY;  Surgeon: Lalo De Leon MD;  Location: [facility];  Service: Endoscopy;  Laterality: N/A;    HERNIA REPAIR      x2    POLYPECTOMY      TUBAL LIGATION      TUBAL LIGATION Bilateral      Family History   Problem Relation Age of Onset    Cancer Father     Colon cancer Father     Heart disease Mother     Hypertension Mother     No Known Problems Sister     Diabetes Brother     Kidney cancer Brother      Social History     Socioeconomic History    Marital status:    Tobacco Use    Smoking status: Every Day    Smokeless tobacco: Never   Substance and Sexual Activity    Alcohol use: No    Drug use: No    Sexual activity: Yes     Partners: Male       Review of patient's allergies indicates:  No Known Allergies    Current Outpatient Medications:     amLODIPine (NORVASC) 5 MG tablet, Take 1 tablet (5 mg total) by mouth every evening., Disp: 90 tablet, Rfl: 1    aspirin (ECOTRIN) 81 MG EC tablet, Take 324 mg by mouth once daily., Disp: , Rfl:     clonazePAM (KLONOPIN) 1 MG tablet, Take 1 tablet (1 mg total) by mouth daily as needed for Anxiety. (Patient taking  differently: Take 1 mg by mouth 2 (two) times daily as needed for Anxiety.), Disp: 30 tablet, Rfl: 1    folic acid (FOLVITE) 1 MG tablet, Take 1 tablet (1 mg total) by mouth once daily., Disp: 90 tablet, Rfl: 2    hydrOXYchloroQUINE (PLAQUENIL) 200 mg tablet, Take 1 tablet (200 mg total) by mouth 2 (two) times daily., Disp: 180 tablet, Rfl: 1    methotrexate 25 mg/mL injection, Inject 0.6 mLs (15 mg total) into the muscle every 7 days., Disp: 2 mL, Rfl: 3    sertraline (ZOLOFT) 25 MG tablet, Take 25 mg by mouth., Disp: , Rfl:     traMADoL (ULTRAM) 50 mg tablet, Take 1 tablet (50 mg total) by mouth every 24 hours as needed for Pain., Disp: 30 tablet, Rfl: 2    nitroGLYCERIN (NITROSTAT) 0.4 MG SL tablet, Place 1 tablet (0.4 mg total) under the tongue every 5 (five) minutes as needed for Chest pain., Disp: 25 tablet, Rfl: 0    rosuvastatin (CRESTOR) 20 MG tablet, Take 1 tablet (20 mg total) by mouth every evening., Disp: 90 tablet, Rfl: 1    Current Facility-Administered Medications:     sodium chloride 0.9% flush 10 mL, 10 mL, Intravenous, PRN, Benjamin Bernal MD    Review of Systems   Constitutional: Negative for weight gain and weight loss.   HENT:  Positive for hearing loss (SINCE CHILDHOOD). Negative for congestion and sore throat.    Eyes:  Negative for blurred vision, pain and redness.   Cardiovascular:  Positive for claudication and dyspnea on exertion. Negative for chest pain, cyanosis, irregular heartbeat, leg swelling (ANKLES), near-syncope, orthopnea (no PND), palpitations, paroxysmal nocturnal dyspnea and syncope.   Respiratory:  Positive for shortness of breath. Negative for cough, hemoptysis and wheezing.    Endocrine: Negative.    Skin: Negative.    Musculoskeletal:  Positive for back pain. Negative for joint pain, muscle cramps and myalgias.   Gastrointestinal:  Negative for diarrhea, hematemesis, melena, nausea and vomiting. Abdominal pain: SOME WITH MESH.  Genitourinary:  Negative for dysuria and  "flank pain.   Neurological:  Negative for dizziness, focal weakness, light-headedness, loss of balance and paresthesias (NEUROPATHY IN LEGS).   Psychiatric/Behavioral: Negative.     Allergic/Immunologic: Negative for persistent infections.      Objective:      Vitals:    06/13/23 1116   BP: 122/69   Pulse: (!) 56   Weight: 69.4 kg (153 lb)   Height: 5' 5" (1.651 m)   PainSc: 0-No pain     Body mass index is 25.46 kg/m².    Physical Exam  Constitutional:       Appearance: Normal appearance.   HENT:      Head: Normocephalic and atraumatic.   Eyes:      Extraocular Movements: Extraocular movements intact.      Pupils: Pupils are equal, round, and reactive to light.   Neck:      Vascular: No carotid bruit.   Cardiovascular:      Rate and Rhythm: Normal rate.      Pulses:           Carotid pulses are 2+ on the right side and 2+ on the left side.       Radial pulses are 2+ on the right side and 2+ on the left side.        Posterior tibial pulses are 2+ on the right side and 2+ on the left side.      Heart sounds: Murmur heard.   Systolic murmur is present with a grade of 2/6 at the upper right sternal border.   Diastolic murmur is present with a grade of 1/4 at the upper right sternal border.     No friction rub. No gallop.   Pulmonary:      Effort: Pulmonary effort is normal.      Breath sounds: Normal breath sounds.   Abdominal:      Palpations: Abdomen is soft.      Tenderness: There is no abdominal tenderness.   Musculoskeletal:      Cervical back: Neck supple.      Right lower leg: No edema.      Left lower leg: No edema.   Skin:     Capillary Refill: Capillary refill takes less than 2 seconds.   Neurological:      General: No focal deficit present.      Mental Status: She is alert and oriented to person, place, and time.      Cranial Nerves: Cranial nerve deficit ([place]q) present.   Psychiatric:         Mood and Affect: Mood normal.         Speech: Speech normal.         Behavior: Behavior normal.         Cognition and " Memory: Cognition normal.               ..    Chemistry        Component Value Date/Time     06/02/2023 1350     04/11/2019 1152    K 4.0 06/02/2023 1350     06/02/2023 1350     04/11/2019 1152    CO2 24 06/02/2023 1350    BUN 7 06/02/2023 1350    CREATININE 0.8 06/02/2023 1350    CREATININE 0.71 04/11/2019 1152    GLU 76 06/02/2023 1350    GLU 96 04/11/2019 1152        Component Value Date/Time    CALCIUM 9.4 06/02/2023 1350    ALKPHOS 65 06/02/2023 1350    AST 17 06/02/2023 1350    AST 18 01/27/2016 0915    ALT 14 06/02/2023 1350    BILITOT 0.5 06/02/2023 1350    ESTGFRAFRICA >60 06/05/2020 1457    EGFRNONAA >60 06/05/2020 1457            ..  Lab Results   Component Value Date    CHOL 192 05/01/2023    CHOL 150 01/27/2016     Lab Results   Component Value Date    HDL 32 (L) 05/01/2023    HDL 31 (L) 01/27/2016     Lab Results   Component Value Date    LDLCALC 123.4 05/01/2023    LDLCALC 72.4 01/27/2016     Lab Results   Component Value Date    TRIG 183 (H) 05/01/2023    TRIG 233 (H) 01/27/2016     Lab Results   Component Value Date    CHOLHDL 16.7 (L) 05/01/2023    CHOLHDL 20.7 01/27/2016     ..  Lab Results   Component Value Date    WBC 5.33 06/02/2023    HGB 14.7 06/02/2023    HCT 41.5 06/02/2023    MCV 95 06/02/2023     06/02/2023       Test(s) Reviewed  I have reviewed the following in detail:  [x] Stress test   [] Angiography   [] Echocardiogram   [x] Labs   [x] Other:       Assessment:         ICD-10-CM ICD-9-CM   1. Abnormal nuclear stress test  R94.39 794.39   2. Mixed dyslipidemia  E78.2 272.2   3. PVD (peripheral vascular disease) with claudication  I73.9 443.9   4. SOB (shortness of breath)  R06.02 786.05   5. Atherosclerosis of abdominal aorta  I70.0 440.0   6. Mild mitral and aortic regurgitation  I08.0 396.3   7. Tobacco use  Z72.0 305.1     Problem List Items Addressed This Visit          Pulmonary    SOB (shortness of breath)       Cardiac/Vascular    Mixed  dyslipidemia    Atherosclerosis of abdominal aorta    Mild mitral and aortic regurgitation    Abnormal nuclear stress test - Primary     Other Visit Diagnoses       PVD (peripheral vascular disease) with claudication  (Chronic)       Tobacco use  (Chronic)       COUNSELING             Plan:     CRESTOR, IN VIEW OF ATHEROSCLEROSIS SIGNIFICANT DYSLIPIDEMIA PROBABLE SIGNIFICANT CAD, PRN SUBLINGUAL NITROGLYCERIN FOR ANGINAL SYMPTOMS, WILL NEED FURTHER EVALUATION ANGIOGRAPHY LEFT HEART CATHETERIZATION POSSIBLE INTERVENTION RISK AND ALTERNATIVE EXPLAINED PATIENT DETAILS TO REVERSIBLE DEFECT WITH SYMPTOMS, NO OVERT HEART FAILURE TOBACCO CESSATION COUNSELING WALKING EXERCISE, NO CLINICAL ARRHYTHMIA RETURN TO CLINIC IN FEW WEEKS AFTER TESTS       Abnormal nuclear stress test    Mixed dyslipidemia    PVD (peripheral vascular disease) with claudication    SOB (shortness of breath)    Atherosclerosis of abdominal aorta  Comments:  NO EMBOLIZATION    Mild mitral and aortic regurgitation    Tobacco use  Comments:  COUNSELING    Other orders  -     nitroGLYCERIN (NITROSTAT) 0.4 MG SL tablet; Place 1 tablet (0.4 mg total) under the tongue every 5 (five) minutes as needed for Chest pain.  Dispense: 25 tablet; Refill: 0  -     rosuvastatin (CRESTOR) 20 MG tablet; Take 1 tablet (20 mg total) by mouth every evening.  Dispense: 90 tablet; Refill: 1    RTC Low level/low impact aerobic exercise 5x's/wk. Heart healthy diet and risk factor modification.    See labs and med orders.    Aerobic exercise 5x's/wk. Heart healthy diet and risk factor modification.    See labs and med orders.

## 2023-06-12 NOTE — PROGRESS NOTES
[facility]        PCP: Wander Solano MD    CC:   Chief Complaint   Patient presents with    Follow-up     MRI results for neck & lbp          HPI:     Ms. Hendrix returns for follow up of lower back pain and left leg pain.  Recall, she has abotu 10 years chronic pain across the lower back with radiation to the left hip and entire leg in a generalized distribution throughout.  Her legs feel fatigued with walking; seeing cardiology who did find 50% stenosis right common femoral artery and no arterial stenosis on the left.  She has focal coccyx and sacrum pain that she relates back to a fall years ago.  She had shahram-reflexia and (+) hoffmans on prior exam.  Presents for follow up after sacrum xray, cervical MRI and lumbar MRI.     Initial HPI:  Ruma Nguyen is a 52 y.o. year old female smoker patient who has a past medical history of Antiphospholipid syndrome, Cancer, Colon polyps, Heart murmur, Hyperlipidemia, RA (rheumatoid arthritis), Radiculopathy, and Rheumatoid arthritis of multiple sites without rheumatoid factor. She presents for lower back pain.  She has abotu 10 years chronic pain across the lower back with radiation to the left hip and entire leg in a generalized distribution throughout.  Her legs feel fatigued with walking; seeing cardiology who did find 50% stenosis right common femoral artery and no arterial stenosis on the left.  She has focal coccyx and sacrum pain that she relates back to a fall years ago.    Overall level of pain in the back/ left leg has increased in the last 2 weeks.  She has tried a heating pad, tramadol.  No other treatments    Denies bowel/ bladder incontinence.    Past and current medications:  Antineuropathics:  NSAIDs:    Antidepressants:  Muscle relaxers:  Opioids:  tramadol  Antiplatelets/Anticoagulants:  ASA    Physical Therapy/ Chiropractic care:  none    Pain Intervention History:  none    Past Spine Surgical  History:  none        History:    Current Outpatient Medications:     amLODIPine (NORVASC) 5 MG tablet, Take 1 tablet (5 mg total) by mouth every evening., Disp: 90 tablet, Rfl: 1    aspirin (ECOTRIN) 81 MG EC tablet, Take 324 mg by mouth once daily., Disp: , Rfl:     clonazePAM (KLONOPIN) 1 MG tablet, Take 1 tablet (1 mg total) by mouth daily as needed for Anxiety. (Patient taking differently: Take 1 mg by mouth 2 (two) times daily as needed for Anxiety.), Disp: 30 tablet, Rfl: 1    folic acid (FOLVITE) 1 MG tablet, Take 1 tablet (1 mg total) by mouth once daily., Disp: 90 tablet, Rfl: 2    hydrOXYchloroQUINE (PLAQUENIL) 200 mg tablet, Take 1 tablet (200 mg total) by mouth 2 (two) times daily., Disp: 180 tablet, Rfl: 1    methotrexate 25 mg/mL injection, Inject 0.6 mLs (15 mg total) into the muscle every 7 days., Disp: 2 mL, Rfl: 3    sertraline (ZOLOFT) 25 MG tablet, Take 25 mg by mouth., Disp: , Rfl:     traMADoL (ULTRAM) 50 mg tablet, Take 1 tablet (50 mg total) by mouth every 24 hours as needed for Pain., Disp: 30 tablet, Rfl: 2    Current Facility-Administered Medications:     sodium chloride 0.9% flush 10 mL, 10 mL, Intravenous, PRN, Benjamin Bernal MD    Past Medical History:   Diagnosis Date    Antiphospholipid syndrome     Cancer     skin, melanoma    Colon polyps     Heart murmur     Hyperlipidemia     RA (rheumatoid arthritis)     Radiculopathy     Rheumatoid arthritis of multiple sites without rheumatoid factor        Past Surgical History:   Procedure Laterality Date    ADENOIDECTOMY      BREAST SURGERY  2006    Augmentation     SECTION      COLONOSCOPY N/A 2020    Procedure: COLONOSCOPY;  Surgeon: Lalo De Leon MD;  Location: [facility];  Service: Endoscopy;  Laterality: N/A;    HERNIA REPAIR      x2    POLYPECTOMY      TUBAL LIGATION      TUBAL LIGATION Bilateral        Family History   Problem Relation Age of Onset    Cancer Father     Colon cancer Father     Heart disease  "Mother     Hypertension Mother     No Known Problems Sister     Diabetes Brother     Kidney cancer Brother        Social History     Socioeconomic History    Marital status:    Tobacco Use    Smoking status: Every Day    Smokeless tobacco: Never   Substance and Sexual Activity    Alcohol use: No    Drug use: No    Sexual activity: Yes     Partners: Male       Review of patient's allergies indicates:  No Known Allergies    Labs:  Lab Results   Component Value Date    HGBA1C 5.0 04/03/2023       Lab Results   Component Value Date    WBC 5.33 06/02/2023    HGB 14.7 06/02/2023    HCT 41.5 06/02/2023    MCV 95 06/02/2023     06/02/2023           Review of Systems:  Low back pain.  Coccyx pain, legs fatidue, left leg pain.  Balance of review of systems is negative.    Physical Exam:  Vitals:    06/12/23 1430   BP: 109/70   Pulse: 67   Weight: 69.4 kg (153 lb)   Height: 5' 5" (1.651 m)   PainSc:   2   PainLoc: Back     Body mass index is 25.46 kg/m².    Gen: NAD  Psych: mood appropriate for given condition  HEENT: eyes anicteric   CV: RRR, 2+ radial pulse  HEENT: anicteric   Respiratory: non-labored, no signs of respiratory distress  Abd: non-distended  Skin: warm, dry and intact.  Gait: Able to heel walk, toe walk. No antalgic gait.     Coordination:   Romberg: negative  Finger to nose coordination: normal  Heel to shin coordination: normal  Tandem walking coordination: normal    Cervical spine: ROM is full in flexion, extension and lateral rotation without increased pain.  Spurling's maneuver causes no neck pain to either side.  Myofascial exam: No Tenderness to palpation across cervical paraspinous region bilaterally.    Lumbar spine:  Lumbar spine: ROM is full with flexion extension and oblique extension with no increased pain.    Jesus Manuel's test causes no increased pain on either side.    Supine straight leg raise is negative bilaterally.    Internal and external rotation of the hip causes no increased " pain on either side.  Myofascial exam: No tenderness to palpation across lumbar paraspinous muscles. No tenderness to palpation over the bilateral greater trochanters and bilateral SI joint    Sensory:  Intact and symmetrical to light touch in C4-T1 dermatomes bilaterally. Intact and symmetrical to light touch in L1-S1 dermatomes bilaterally.    Motor:    Right Left   C4 Shoulder Abduction  5  5   C5 Elbow Flexion    5  5   C6 Wrist Extension  5  5   C7 Elbow Extension   5  5   C8/T1 Hand Intrinsics   5  5        Right Left   L2/3 Iliacus Hip flexion  5  5   L3/4 Qudratus Femoris Knee Extension  5  5   L4/5 Tib Anterior Ankle Dorsiflexion   5  5   L5/S1 Extensor Hallicus Longus Great toe extension  5  5   S1/S2 Gastroc/Soleus Plantar Flexion  5  5      Right Left   Triceps DTR 3+ 3+   Biceps DTR 3+ 3+   Brachioradialis DTR 3+ 3+   Patellar DTR 3+ 3+   Achilles DTR 3+ 3+   Pritchard Presnt  Present   Clonus Present Present   Babinski Absent Absent       Imaging:    Xray lumbar spine 1-11-23:  degenerative changes at L5/S1 with vacumm disc changes.    Xray sacrum 6-2-23:  no evidence of fracture.    MRI cervical spine 6-5-23:  no significant central canal stenosis.  No cord signal chagne.  No cord compression.  C5/6 broad disk with some bilateral foraminal narrowing.  Multiple thyroid nodules with recommendation of thyroid ultrasound by the radiologist.    MRI lumbar spine from 6-5-23 reviewed.  L4/5 facet hypertrophy and broad based disk with some bilateral foraminal narrowing.  L5/S1 disk space narrowing and disk/ osteophyte complex, facet arthropathy with bilateral foraminal narrowing.          Assessment:   Ruma Nguyen is a 52 y.o. year old female patient who has a past medical history of Antiphospholipid syndrome, Cancer, Colon polyps, Heart murmur, Hyperlipidemia, RA (rheumatoid arthritis), Radiculopathy, and Rheumatoid arthritis of multiple sites without rheumatoid factor. She presents for follow up of   chronic lower back pain with left leg radicular pain in a generalized distribution, no focal dermatomal districution.  No lumbar neurological defcitis.  Possible myofascial pain vs true lumbar radiculopathy due to L4/5, L5/S1 degenerative chagnes and foraminal narrowing.  No left sided arterial claudication to explain left leg symptoms; she does have a follow up with cardiology this week.   She has exam findings conserning for myelopathy - hyper reflexia, hoffmans; however no cord compression noted on MRI and no cord signal change.  No evidence of prior sacarl pain.      Problem List Items Addressed This Visit    None        Plan:  - coccyx pain - may consider ganglian impar block in the future   - lower back and left leg pain - strongtly encouraged PT to help with pain, but she is hesitant to consider; also discussed left L4/5, L5/S1 transforaminal RADHA, but also hesitant to consider.  - cervical spine - no cord abnormalities of concern  - thyroid nodules - ultrasound order placed; she should follow up with pcp after ultrasound for further recommendations.    - follow up in clinic as needed.       Follow Up: RTC as needed.     : Reviewed and consistent with medication use as prescribed.    Thank you for referring this interesting patient, and I look forward to continuing to collaborate in her care.        Miranda Davis PA-C  [facility]

## 2023-06-12 NOTE — TELEPHONE ENCOUNTER
I spoke with Mrs. Nguyen and she has been scheduled for a thyroid ultrasound 6-15-23, she indicated understanding.

## 2023-06-12 NOTE — Clinical Note
Please schedule thyroid ultrasound.  Discussed in clinic today for nodules seen on cervical MRI.  Needs to discuss results with pcp.

## 2023-06-13 ENCOUNTER — OFFICE VISIT (OUTPATIENT)
Dept: CARDIOLOGY | Facility: CLINIC | Age: 53
End: 2023-06-13
Payer: COMMERCIAL

## 2023-06-13 ENCOUNTER — PATIENT MESSAGE (OUTPATIENT)
Dept: CARDIOLOGY | Facility: CLINIC | Age: 53
End: 2023-06-13

## 2023-06-13 VITALS
WEIGHT: 153 LBS | SYSTOLIC BLOOD PRESSURE: 122 MMHG | DIASTOLIC BLOOD PRESSURE: 69 MMHG | HEART RATE: 56 BPM | BODY MASS INDEX: 25.49 KG/M2 | HEIGHT: 65 IN

## 2023-06-13 DIAGNOSIS — E78.2 MIXED DYSLIPIDEMIA: Chronic | ICD-10-CM

## 2023-06-13 DIAGNOSIS — R06.02 SOB (SHORTNESS OF BREATH): ICD-10-CM

## 2023-06-13 DIAGNOSIS — R94.39 ABNORMAL NUCLEAR STRESS TEST: Primary | ICD-10-CM

## 2023-06-13 DIAGNOSIS — Z72.0 TOBACCO USE: Chronic | ICD-10-CM

## 2023-06-13 DIAGNOSIS — I70.0 ATHEROSCLEROSIS OF ABDOMINAL AORTA: Chronic | ICD-10-CM

## 2023-06-13 DIAGNOSIS — I73.9 PVD (PERIPHERAL VASCULAR DISEASE) WITH CLAUDICATION: Chronic | ICD-10-CM

## 2023-06-13 DIAGNOSIS — I08.0 MILD MITRAL AND AORTIC REGURGITATION: ICD-10-CM

## 2023-06-13 PROCEDURE — 3074F PR MOST RECENT SYSTOLIC BLOOD PRESSURE < 130 MM HG: ICD-10-PCS | Mod: CPTII,S$GLB,, | Performed by: INTERNAL MEDICINE

## 2023-06-13 PROCEDURE — 99214 PR OFFICE/OUTPT VISIT, EST, LEVL IV, 30-39 MIN: ICD-10-PCS | Mod: S$GLB,,, | Performed by: INTERNAL MEDICINE

## 2023-06-13 PROCEDURE — 3008F PR BODY MASS INDEX (BMI) DOCUMENTED: ICD-10-PCS | Mod: CPTII,S$GLB,, | Performed by: INTERNAL MEDICINE

## 2023-06-13 PROCEDURE — 3078F PR MOST RECENT DIASTOLIC BLOOD PRESSURE < 80 MM HG: ICD-10-PCS | Mod: CPTII,S$GLB,, | Performed by: INTERNAL MEDICINE

## 2023-06-13 PROCEDURE — 3074F SYST BP LT 130 MM HG: CPT | Mod: CPTII,S$GLB,, | Performed by: INTERNAL MEDICINE

## 2023-06-13 PROCEDURE — 3044F HG A1C LEVEL LT 7.0%: CPT | Mod: CPTII,S$GLB,, | Performed by: INTERNAL MEDICINE

## 2023-06-13 PROCEDURE — 3008F BODY MASS INDEX DOCD: CPT | Mod: CPTII,S$GLB,, | Performed by: INTERNAL MEDICINE

## 2023-06-13 PROCEDURE — 99999 PR PBB SHADOW E&M-EST. PATIENT-LVL III: ICD-10-PCS | Mod: PBBFAC,,, | Performed by: INTERNAL MEDICINE

## 2023-06-13 PROCEDURE — 3078F DIAST BP <80 MM HG: CPT | Mod: CPTII,S$GLB,, | Performed by: INTERNAL MEDICINE

## 2023-06-13 PROCEDURE — 1159F MED LIST DOCD IN RCRD: CPT | Mod: CPTII,S$GLB,, | Performed by: INTERNAL MEDICINE

## 2023-06-13 PROCEDURE — 99999 PR PBB SHADOW E&M-EST. PATIENT-LVL III: CPT | Mod: PBBFAC,,, | Performed by: INTERNAL MEDICINE

## 2023-06-13 PROCEDURE — 1159F PR MEDICATION LIST DOCUMENTED IN MEDICAL RECORD: ICD-10-PCS | Mod: CPTII,S$GLB,, | Performed by: INTERNAL MEDICINE

## 2023-06-13 PROCEDURE — 3044F PR MOST RECENT HEMOGLOBIN A1C LEVEL <7.0%: ICD-10-PCS | Mod: CPTII,S$GLB,, | Performed by: INTERNAL MEDICINE

## 2023-06-13 PROCEDURE — 99214 OFFICE O/P EST MOD 30 MIN: CPT | Mod: S$GLB,,, | Performed by: INTERNAL MEDICINE

## 2023-06-13 RX ORDER — NITROGLYCERIN 0.4 MG/1
0.4 TABLET SUBLINGUAL EVERY 5 MIN PRN
Qty: 25 TABLET | Refills: 0 | Status: SHIPPED | OUTPATIENT
Start: 2023-06-13 | End: 2024-06-12

## 2023-06-13 RX ORDER — ROSUVASTATIN CALCIUM 20 MG/1
20 TABLET, COATED ORAL NIGHTLY
Qty: 90 TABLET | Refills: 1 | Status: SHIPPED | OUTPATIENT
Start: 2023-06-13 | End: 2023-07-25 | Stop reason: SDUPTHER

## 2023-07-10 ENCOUNTER — TELEPHONE (OUTPATIENT)
Dept: CARDIOLOGY | Facility: CLINIC | Age: 53
End: 2023-07-10
Payer: COMMERCIAL

## 2023-07-10 NOTE — TELEPHONE ENCOUNTER
----- Message from Yoana Reyes sent at 7/10/2023 12:42 PM CDT -----  Regarding: return call  Contact: patient  Type:  Patient Returning Call    Who Called:  patient  Who Left Message for Patient:  Hilary  Does the patient know what this is regarding?:  procedure scheduling  Best Call Back Number:  [phone] (home)     Additional Information:  Please call patient concerning time for her procedure.  Thanks!

## 2023-07-17 ENCOUNTER — TELEPHONE (OUTPATIENT)
Dept: PAIN MEDICINE | Facility: CLINIC | Age: 53
End: 2023-07-17

## 2023-07-17 ENCOUNTER — HOSPITAL ENCOUNTER (OUTPATIENT)
Dept: RADIOLOGY | Facility: HOSPITAL | Age: 53
Discharge: HOME | End: 2023-07-17
Attending: PHYSICIAN ASSISTANT
Payer: COMMERCIAL

## 2023-07-17 DIAGNOSIS — E04.2 MULTIPLE THYROID NODULES: ICD-10-CM

## 2023-07-17 PROCEDURE — 76536 US THYROID: ICD-10-PCS | Mod: 26,,, | Performed by: RADIOLOGY

## 2023-07-17 PROCEDURE — 76536 US EXAM OF HEAD AND NECK: CPT | Mod: TC,PO

## 2023-07-17 PROCEDURE — 76536 US EXAM OF HEAD AND NECK: CPT | Mod: 26,,, | Performed by: RADIOLOGY

## 2023-07-17 NOTE — TELEPHONE ENCOUNTER
No message attached.  She is scheduled for a thyroid ultrashound and to follow up with pcp regarding results.      Blake she have any questions/ concerns to address?

## 2023-07-24 ENCOUNTER — TELEPHONE (OUTPATIENT)
Dept: CARDIOLOGY | Facility: CLINIC | Age: 53
End: 2023-07-24
Payer: COMMERCIAL

## 2023-08-06 NOTE — PROGRESS NOTES
Subjective:    Patient ID:  Ruma Nguyen is a 52 y.o. female who presents for No chief complaint on file.        HPI  STATUS POST LEFT HEART CATHETERIZATION, LVEDP 27, SEVERE OSTIAL LEFT CIRCUMFLEX ARTERY DISEASE WITH SUCCESSFUL PCI, THE RCA HAS ABOUT 50% STENOSIS, DISCUSSED FINDINGS, FEELS BETTER, DECREASED TOBACCO, SEE ROS    Past Medical History:   Diagnosis Date    Anticoagulant long-term use     Antiphospholipid syndrome     Cancer     skin, melanoma    Colon polyps     Heart murmur     Hyperlipidemia     Hypertension     PVD (peripheral vascular disease) with claudication 2023    RA (rheumatoid arthritis)     Radiculopathy     lumbar    Rheumatoid arthritis of multiple sites without rheumatoid factor     Thyroid nodule 2023     Past Surgical History:   Procedure Laterality Date    ADENOIDECTOMY      ANGIOGRAM, CORONARY, WITH LEFT HEART CATHETERIZATION  2023    Procedure: Left Heart Cath;  Surgeon: Benjamin Bernal MD;  Location: [facility] CATH;  Service: Cardiology;;    BREAST SURGERY  2006    Augmentation     SECTION      COLONOSCOPY N/A 2020    Procedure: COLONOSCOPY;  Surgeon: Lalo De Leon MD;  Location: [facility] ENDO;  Service: Endoscopy;  Laterality: N/A;    CORONARY ANGIOGRAPHY  2023    Procedure: -;  Surgeon: Benjamin Bernal MD;  Location: [facility] CATH;  Service: Cardiology;;    HERNIA REPAIR      x 3    PERCUTANEOUS CORONARY INTERVENTION, ARTERY  2023    Procedure: RED LCX;  Surgeon: Benjamin Bernal MD;  Location: [facility] CATH;  Service: Cardiology;;    POLYPECTOMY      STENT, DRUG ELUTING, SINGLE VESSEL, CORONARY  2023    Procedure: -;  Surgeon: Benjamin Bernal MD;  [facility];  Service: Cardiology;;    TUBAL LIGATION Bilateral      Family History   Problem Relation Age of Onset    Heart disease Mother     Hypertension Mother     Cancer Father     Colon cancer Father     No Known Problems Sister     Diabetes Brother     Kidney  cancer Brother      Social History     Socioeconomic History    Marital status:    Tobacco Use    Smoking status: Every Day     Current packs/day: 0.75     Average packs/day: 0.8 packs/day for 38.0 years (28.5 ttl pk-yrs)     Types: Cigarettes    Smokeless tobacco: Never    Tobacco comments:     Needs help to quit; had problems getting chantix   Substance and Sexual Activity    Alcohol use: No    Drug use: No    Sexual activity: Yes     Partners: Male       Review of patient's allergies indicates:  No Known Allergies    Current Outpatient Medications:     albuterol (PROVENTIL) 2.5 mg /3 mL (0.083 %) nebulizer solution, Inhale 3 mL 3 times a day by nebulization route for 30 days., Disp: , Rfl:     amLODIPine (NORVASC) 5 MG tablet, TAKE 1 TABLET BY MOUTH EVERY DAY IN THE EVENING, Disp: 90 tablet, Rfl: 1    amoxicillin (AMOXIL) 500 MG Tab, SMARTSI Tablet(s) By Mouth, Disp: , Rfl:     aspirin (ECOTRIN) 81 MG EC tablet, Take 324 mg by mouth every evening., Disp: , Rfl:     azaTHIOprine (IMURAN) 50 mg Tab, Take 1 tablet by mouth once daily., Disp: , Rfl:     clonazePAM (KLONOPIN) 1 MG tablet, Take 1 tablet (1 mg total) by mouth daily as needed for Anxiety. (Patient taking differently: Take 1 mg by mouth 2 (two) times daily as needed for Anxiety.), Disp: 30 tablet, Rfl: 1    folic acid (FOLVITE) 1 MG tablet, Take 1 tablet (1 mg total) by mouth once daily. (Patient taking differently: Take 1 mg by mouth every evening.), Disp: 90 tablet, Rfl: 2    hydrOXYchloroQUINE (PLAQUENIL) 200 mg tablet, Take 1 tablet (200 mg total) by mouth 2 (two) times daily., Disp: 180 tablet, Rfl: 1    methotrexate 25 mg/mL injection, Inject 0.6 mLs (15 mg total) into the muscle every 7 days., Disp: 2 mL, Rfl: 3    nitroGLYCERIN (NITROSTAT) 0.4 MG SL tablet, Place 1 tablet (0.4 mg total) under the tongue every 5 (five) minutes as needed for Chest pain., Disp: 25 tablet, Rfl: 0    rosuvastatin (CRESTOR) 40 MG Tab, Take 1  "tablet (40 mg total) by mouth every evening., Disp: 90 tablet, Rfl: 1    sertraline (ZOLOFT) 25 MG tablet, Take 25 mg by mouth every evening., Disp: , Rfl:     traMADoL (ULTRAM) 50 mg tablet, Take 1 tablet (50 mg total) by mouth every 24 hours as needed for Pain., Disp: 30 tablet, Rfl: 2    clopidogreL (PLAVIX) 75 mg tablet, Take 1 tablet (75 mg total) by mouth once daily., Disp: 90 tablet, Rfl: 1    Review of Systems   Constitutional: Negative for weight gain and weight loss.   HENT:  Positive for hearing loss (SINCE CHILDHOOD). Negative for congestion and sore throat.    Eyes:  Negative for blurred vision, pain and redness.   Cardiovascular:  Positive for claudication (MILD). Negative for chest pain, cyanosis, dyspnea on exertion, irregular heartbeat, leg swelling (ANKLES), near-syncope, orthopnea (no PND), palpitations, paroxysmal nocturnal dyspnea and syncope.   Respiratory:  Negative for cough, hemoptysis and wheezing.    Endocrine: Negative.    Hematologic/Lymphatic: Negative for adenopathy. Bruises/bleeds easily: OCC.   Skin: Negative.    Musculoskeletal:  Positive for back pain. Negative for joint pain, muscle cramps and myalgias.   Gastrointestinal:  Negative for diarrhea, melena, nausea and vomiting.   Genitourinary:  Negative for dysuria and flank pain.   Neurological:  Negative for dizziness, focal weakness, light-headedness, loss of balance and paresthesias (NEUROPATHY).   Psychiatric/Behavioral: Negative.     Allergic/Immunologic: Negative for persistent infections.        Objective:      Vitals:    08/08/23 1139   BP: 130/68   Pulse: (!) 54   Weight: 67.2 kg (148 lb 2.4 oz)   Height: 5' 5" (1.651 m)   PainSc: 0-No pain     Body mass index is 24.65 kg/m².    Physical Exam  Constitutional:       Appearance: Normal appearance.   HENT:      Head: Normocephalic and atraumatic.   Eyes:      Extraocular Movements: Extraocular movements intact.      Pupils: Pupils are equal, round, and reactive to light. "   Neck:      Vascular: Carotid bruit present.   Cardiovascular:      Rate and Rhythm: Normal rate. No extrasystoles are present.     Pulses:           Carotid pulses are 2+ on the right side with bruit and 2+ on the left side with bruit.       Radial pulses are 2+ on the right side and 2+ on the left side.        Posterior tibial pulses are 2+ on the right side and 2+ on the left side.      Heart sounds: Murmur heard.      Systolic murmur is present with a grade of 1/6 at the upper right sternal border.      Diastolic murmur is present with a grade of 1/4 at the upper right sternal border.      No friction rub. No gallop.   Pulmonary:      Effort: Pulmonary effort is normal.      Breath sounds: Normal breath sounds.   Abdominal:      Palpations: Abdomen is soft.      Tenderness: There is no abdominal tenderness.   Musculoskeletal:      Cervical back: Neck supple.      Right lower leg: No edema.      Left lower leg: No edema.   Skin:     Capillary Refill: Capillary refill takes less than 2 seconds.   Neurological:      General: No focal deficit present.      Mental Status: She is alert and oriented to person, place, and time.      Cranial Nerves: Cranial nerve deficit ([place]) present.   Psychiatric:         Mood and Affect: Mood normal.         Speech: Speech normal.         Behavior: Behavior normal.         Cognition and Memory: Cognition normal.             ..    Chemistry        Component Value Date/Time     08/08/2023 1212     04/11/2019 1152    K 3.8 08/08/2023 1212     08/08/2023 1212     04/11/2019 1152    CO2 24 08/08/2023 1212    BUN 10 08/08/2023 1212    CREATININE 0.8 08/08/2023 1212    CREATININE 0.71 04/11/2019 1152     08/08/2023 1212    GLU 96 04/11/2019 1152        Component Value Date/Time    CALCIUM 9.8 08/08/2023 1212    ALKPHOS 76 08/08/2023 1212    AST 43 (H) 08/08/2023 1212    AST 18 01/27/2016 0915    ALT 47 (H) 08/08/2023 1212    BILITOT 0.4 08/08/2023 1212     ESTGFRAFRICA >60 06/05/2020 1457    EGFRNONAA >60 06/05/2020 1457            ..  Lab Results   Component Value Date    CHOL 192 05/01/2023    CHOL 150 01/27/2016     Lab Results   Component Value Date    HDL 32 (L) 05/01/2023    HDL 31 (L) 01/27/2016     Lab Results   Component Value Date    LDLCALC 123.4 05/01/2023    LDLCALC 72.4 01/27/2016     Lab Results   Component Value Date    TRIG 183 (H) 05/01/2023    TRIG 233 (H) 01/27/2016     Lab Results   Component Value Date    CHOLHDL 16.7 (L) 05/01/2023    CHOLHDL 20.7 01/27/2016     ..  Lab Results   Component Value Date    WBC 5.06 08/08/2023    HGB 14.0 08/08/2023    HCT 38.2 08/08/2023    MCV 99 (H) 08/08/2023     08/08/2023       Test(s) Reviewed  I have reviewed the following in detail:  [] Stress test   [x] Angiography   [] Echocardiogram   [x] Labs   [] Other:       Assessment:         ICD-10-CM ICD-9-CM   1. Coronary artery disease involving native coronary artery of native heart without angina pectoris  I25.10 414.01   2. Elevated left ventricular end-diastolic pressure (LVEDP)  R94.30 794.30   3. Bilateral carotid bruits  R09.89 785.9   4. Tobacco use  Z72.0 305.1   5. Stented coronary artery  Z95.5 V45.82   6. Mild mitral and aortic regurgitation  I08.0 396.3   7. PVD (peripheral vascular disease) with claudication  I73.9 443.9   8. Long term (current) use of antithrombotics/antiplatelets  Z79.02 V58.63   9. Mixed dyslipidemia  E78.2 272.2     Problem List Items Addressed This Visit          Cardiac/Vascular    Mixed dyslipidemia    Relevant Orders    Comprehensive Metabolic Panel    Lipid Panel    Mild mitral and aortic regurgitation    Coronary artery disease involving native coronary artery of native heart without angina pectoris - Primary    Relevant Orders    Comprehensive Metabolic Panel    Lipid Panel    Stented coronary artery    Elevated left ventricular end-diastolic pressure (LVEDP)    PVD (peripheral vascular disease) with claudication     Bilateral carotid bruits    Relevant Orders    CV Ultrasound Bilateral Doppler Carotid       Hematology    Long term (current) use of antithrombotics/antiplatelets    Relevant Orders    Comprehensive Metabolic Panel    Hemoglobin       Other    Tobacco use        Plan:         TOBACCO CESSATION COUNSELING, CHECK CAROTID ULTRASOUND, WALKING EXERCISE PROGRAM FOR PERIPHERAL VASCULAR DISEASE APPEARS TO BE MODERATE, EXPLAINED RELATION TO TOBACCO,ALL OTHER CV CLINICALLY STABLE, NO ANGINA, NO HF, NO TIA, NO CLINICAL ARRHYTHMIA,CONTINUE CURRENT MEDS, EDUCATION, DIET, EXERCISE , WALKING EXERCISE RETURN TO CLINIC IN 3 MONTHS WITH LABS  Coronary artery disease involving native coronary artery of native heart without angina pectoris  -     Comprehensive Metabolic Panel; Future; Expected date: 11/08/2023  -     Lipid Panel; Future; Expected date: 11/08/2023    Elevated left ventricular end-diastolic pressure (LVEDP)    Bilateral carotid bruits  Comments:  CAROTID ULTRASOUND  Orders:  -     CV Ultrasound Bilateral Doppler Carotid; Future    Tobacco use  Comments:  COUNSELING    Stented coronary artery  Comments:  STABLE    Mild mitral and aortic regurgitation    PVD (peripheral vascular disease) with claudication  Comments:  WALKING EXERCISE    Long term (current) use of antithrombotics/antiplatelets  -     Comprehensive Metabolic Panel; Future; Expected date: 11/08/2023  -     Hemoglobin; Future; Expected date: 11/08/2023    Mixed dyslipidemia  Comments:  DIET AND DAILY MEDS  Orders:  -     Comprehensive Metabolic Panel; Future; Expected date: 11/08/2023  -     Lipid Panel; Future; Expected date: 11/08/2023    Other orders  -     clopidogreL (PLAVIX) 75 mg tablet; Take 1 tablet (75 mg total) by mouth once daily.  Dispense: 90 tablet; Refill: 1    RTC Low level/low impact aerobic exercise 5x's/wk. Heart healthy diet and risk factor modification.    See labs and med orders.    Aerobic exercise 5x's/wk. Heart healthy diet and  risk factor modification.    See labs and med orders.

## 2023-08-08 ENCOUNTER — LAB VISIT (OUTPATIENT)
Dept: LAB | Facility: HOSPITAL | Age: 53
End: 2023-08-08
Attending: INTERNAL MEDICINE
Payer: COMMERCIAL

## 2023-08-08 ENCOUNTER — PATIENT MESSAGE (OUTPATIENT)
Dept: RHEUMATOLOGY | Facility: CLINIC | Age: 53
End: 2023-08-08
Payer: COMMERCIAL

## 2023-08-08 ENCOUNTER — OFFICE VISIT (OUTPATIENT)
Dept: CARDIOLOGY | Facility: CLINIC | Age: 53
End: 2023-08-08
Payer: COMMERCIAL

## 2023-08-08 VITALS
HEART RATE: 54 BPM | DIASTOLIC BLOOD PRESSURE: 68 MMHG | WEIGHT: 148.13 LBS | BODY MASS INDEX: 24.68 KG/M2 | SYSTOLIC BLOOD PRESSURE: 130 MMHG | HEIGHT: 65 IN

## 2023-08-08 DIAGNOSIS — R94.30 ELEVATED LEFT VENTRICULAR END-DIASTOLIC PRESSURE (LVEDP): Chronic | ICD-10-CM

## 2023-08-08 DIAGNOSIS — I73.9 PVD (PERIPHERAL VASCULAR DISEASE) WITH CLAUDICATION: Chronic | ICD-10-CM

## 2023-08-08 DIAGNOSIS — R09.89 BILATERAL CAROTID BRUITS: ICD-10-CM

## 2023-08-08 DIAGNOSIS — Z95.5 STENTED CORONARY ARTERY: ICD-10-CM

## 2023-08-08 DIAGNOSIS — E78.2 MIXED DYSLIPIDEMIA: Chronic | ICD-10-CM

## 2023-08-08 DIAGNOSIS — I08.0 MILD MITRAL AND AORTIC REGURGITATION: Chronic | ICD-10-CM

## 2023-08-08 DIAGNOSIS — Z79.02 LONG TERM (CURRENT) USE OF ANTITHROMBOTICS/ANTIPLATELETS: Chronic | ICD-10-CM

## 2023-08-08 DIAGNOSIS — I25.10 CORONARY ARTERY DISEASE INVOLVING NATIVE CORONARY ARTERY OF NATIVE HEART WITHOUT ANGINA PECTORIS: Primary | Chronic | ICD-10-CM

## 2023-08-08 DIAGNOSIS — M06.09 RHEUMATOID ARTHRITIS OF MULTIPLE SITES WITH NEGATIVE RHEUMATOID FACTOR: ICD-10-CM

## 2023-08-08 DIAGNOSIS — D68.61 ANTIPHOSPHOLIPID SYNDROME: ICD-10-CM

## 2023-08-08 DIAGNOSIS — Z72.0 TOBACCO USE: Chronic | ICD-10-CM

## 2023-08-08 LAB
ALBUMIN SERPL BCP-MCNC: 4.3 G/DL (ref 3.5–5.2)
ALP SERPL-CCNC: 76 U/L (ref 55–135)
ALT SERPL W/O P-5'-P-CCNC: 47 U/L (ref 10–44)
ANION GAP SERPL CALC-SCNC: 13 MMOL/L (ref 8–16)
AST SERPL-CCNC: 43 U/L (ref 10–40)
BASOPHILS # BLD AUTO: 0.05 K/UL (ref 0–0.2)
BASOPHILS NFR BLD: 1 % (ref 0–1.9)
BILIRUB SERPL-MCNC: 0.4 MG/DL (ref 0.1–1)
BUN SERPL-MCNC: 10 MG/DL (ref 6–20)
C3 SERPL-MCNC: 118 MG/DL (ref 50–180)
C4 SERPL-MCNC: 7 MG/DL (ref 11–44)
CALCIUM SERPL-MCNC: 9.8 MG/DL (ref 8.7–10.5)
CHLORIDE SERPL-SCNC: 106 MMOL/L (ref 95–110)
CO2 SERPL-SCNC: 24 MMOL/L (ref 23–29)
CREAT SERPL-MCNC: 0.8 MG/DL (ref 0.5–1.4)
CRP SERPL-MCNC: 2.3 MG/L (ref 0–8.2)
DIFFERENTIAL METHOD: ABNORMAL
EOSINOPHIL # BLD AUTO: 0.2 K/UL (ref 0–0.5)
EOSINOPHIL NFR BLD: 3.8 % (ref 0–8)
ERYTHROCYTE [DISTWIDTH] IN BLOOD BY AUTOMATED COUNT: 14.3 % (ref 11.5–14.5)
ERYTHROCYTE [SEDIMENTATION RATE] IN BLOOD BY PHOTOMETRIC METHOD: 16 MM/HR (ref 0–36)
EST. GFR  (NO RACE VARIABLE): >60 ML/MIN/1.73 M^2
GLUCOSE SERPL-MCNC: 110 MG/DL (ref 70–110)
HCT VFR BLD AUTO: 38.2 % (ref 37–48.5)
HGB BLD-MCNC: 14 G/DL (ref 12–16)
IMM GRANULOCYTES # BLD AUTO: 0.01 K/UL (ref 0–0.04)
IMM GRANULOCYTES NFR BLD AUTO: 0.2 % (ref 0–0.5)
LYMPHOCYTES # BLD AUTO: 1.9 K/UL (ref 1–4.8)
LYMPHOCYTES NFR BLD: 37.2 % (ref 18–48)
MCH RBC QN AUTO: 36.2 PG (ref 27–31)
MCHC RBC AUTO-ENTMCNC: 36.6 G/DL (ref 32–36)
MCV RBC AUTO: 99 FL (ref 82–98)
MONOCYTES # BLD AUTO: 0.2 K/UL (ref 0.3–1)
MONOCYTES NFR BLD: 3.6 % (ref 4–15)
NEUTROPHILS # BLD AUTO: 2.8 K/UL (ref 1.8–7.7)
NEUTROPHILS NFR BLD: 54.2 % (ref 38–73)
NRBC BLD-RTO: 0 /100 WBC
PLATELET # BLD AUTO: 218 K/UL (ref 150–450)
PMV BLD AUTO: 9.3 FL (ref 9.2–12.9)
POTASSIUM SERPL-SCNC: 3.8 MMOL/L (ref 3.5–5.1)
PROT SERPL-MCNC: 7.6 G/DL (ref 6–8.4)
RBC # BLD AUTO: 3.87 M/UL (ref 4–5.4)
SODIUM SERPL-SCNC: 143 MMOL/L (ref 136–145)
WBC # BLD AUTO: 5.06 K/UL (ref 3.9–12.7)

## 2023-08-08 PROCEDURE — 3075F PR MOST RECENT SYSTOLIC BLOOD PRESS GE 130-139MM HG: ICD-10-PCS | Mod: CPTII,S$GLB,, | Performed by: INTERNAL MEDICINE

## 2023-08-08 PROCEDURE — 1159F PR MEDICATION LIST DOCUMENTED IN MEDICAL RECORD: ICD-10-PCS | Mod: CPTII,S$GLB,, | Performed by: INTERNAL MEDICINE

## 2023-08-08 PROCEDURE — 80053 COMPREHEN METABOLIC PANEL: CPT | Mod: PO | Performed by: INTERNAL MEDICINE

## 2023-08-08 PROCEDURE — 99999 PR PBB SHADOW E&M-EST. PATIENT-LVL III: CPT | Mod: PBBFAC,,, | Performed by: INTERNAL MEDICINE

## 2023-08-08 PROCEDURE — 86140 C-REACTIVE PROTEIN: CPT | Performed by: INTERNAL MEDICINE

## 2023-08-08 PROCEDURE — 3044F PR MOST RECENT HEMOGLOBIN A1C LEVEL <7.0%: ICD-10-PCS | Mod: CPTII,S$GLB,, | Performed by: INTERNAL MEDICINE

## 2023-08-08 PROCEDURE — 36415 COLL VENOUS BLD VENIPUNCTURE: CPT | Mod: PO | Performed by: INTERNAL MEDICINE

## 2023-08-08 PROCEDURE — 85025 COMPLETE CBC W/AUTO DIFF WBC: CPT | Mod: PO | Performed by: INTERNAL MEDICINE

## 2023-08-08 PROCEDURE — 86160 COMPLEMENT ANTIGEN: CPT | Mod: 59 | Performed by: INTERNAL MEDICINE

## 2023-08-08 PROCEDURE — 3008F PR BODY MASS INDEX (BMI) DOCUMENTED: ICD-10-PCS | Mod: CPTII,S$GLB,, | Performed by: INTERNAL MEDICINE

## 2023-08-08 PROCEDURE — 3044F HG A1C LEVEL LT 7.0%: CPT | Mod: CPTII,S$GLB,, | Performed by: INTERNAL MEDICINE

## 2023-08-08 PROCEDURE — 99214 PR OFFICE/OUTPT VISIT, EST, LEVL IV, 30-39 MIN: ICD-10-PCS | Mod: S$GLB,,, | Performed by: INTERNAL MEDICINE

## 2023-08-08 PROCEDURE — 86038 ANTINUCLEAR ANTIBODIES: CPT | Performed by: INTERNAL MEDICINE

## 2023-08-08 PROCEDURE — 85652 RBC SED RATE AUTOMATED: CPT | Performed by: INTERNAL MEDICINE

## 2023-08-08 PROCEDURE — 3078F PR MOST RECENT DIASTOLIC BLOOD PRESSURE < 80 MM HG: ICD-10-PCS | Mod: CPTII,S$GLB,, | Performed by: INTERNAL MEDICINE

## 2023-08-08 PROCEDURE — 1159F MED LIST DOCD IN RCRD: CPT | Mod: CPTII,S$GLB,, | Performed by: INTERNAL MEDICINE

## 2023-08-08 PROCEDURE — 99214 OFFICE O/P EST MOD 30 MIN: CPT | Mod: S$GLB,,, | Performed by: INTERNAL MEDICINE

## 2023-08-08 PROCEDURE — 99999 PR PBB SHADOW E&M-EST. PATIENT-LVL III: ICD-10-PCS | Mod: PBBFAC,,, | Performed by: INTERNAL MEDICINE

## 2023-08-08 PROCEDURE — 3075F SYST BP GE 130 - 139MM HG: CPT | Mod: CPTII,S$GLB,, | Performed by: INTERNAL MEDICINE

## 2023-08-08 PROCEDURE — 82595 ASSAY OF CRYOGLOBULIN: CPT | Performed by: INTERNAL MEDICINE

## 2023-08-08 PROCEDURE — 3008F BODY MASS INDEX DOCD: CPT | Mod: CPTII,S$GLB,, | Performed by: INTERNAL MEDICINE

## 2023-08-08 PROCEDURE — 86160 COMPLEMENT ANTIGEN: CPT | Performed by: INTERNAL MEDICINE

## 2023-08-08 PROCEDURE — 3078F DIAST BP <80 MM HG: CPT | Mod: CPTII,S$GLB,, | Performed by: INTERNAL MEDICINE

## 2023-08-08 RX ORDER — CLOPIDOGREL BISULFATE 75 MG/1
75 TABLET ORAL DAILY
Qty: 90 TABLET | Refills: 1 | Status: ON HOLD | OUTPATIENT
Start: 2023-08-08 | End: 2023-12-28

## 2023-08-09 ENCOUNTER — TELEPHONE (OUTPATIENT)
Dept: RHEUMATOLOGY | Facility: CLINIC | Age: 53
End: 2023-08-09
Payer: COMMERCIAL

## 2023-08-09 LAB — ANA SER QL IF: NORMAL

## 2023-08-09 NOTE — TELEPHONE ENCOUNTER
----- Message from Jesus Amador MD sent at 8/8/2023  9:44 PM CDT -----  Stable values. Please repeat CMP in 4 weeks to monitor liver function abnormality.   Thanks!

## 2023-08-10 DIAGNOSIS — D68.61 ANTIPHOSPHOLIPID SYNDROME: ICD-10-CM

## 2023-08-10 DIAGNOSIS — M06.09 RHEUMATOID ARTHRITIS OF MULTIPLE SITES WITH NEGATIVE RHEUMATOID FACTOR: ICD-10-CM

## 2023-08-10 RX ORDER — METHOTREXATE 25 MG/ML
15 INJECTION, SOLUTION INTRA-ARTERIAL; INTRAMUSCULAR; INTRAVENOUS
Qty: 2 ML | Refills: 3 | Status: SHIPPED | OUTPATIENT
Start: 2023-08-10 | End: 2024-03-13 | Stop reason: SINTOL

## 2023-08-10 NOTE — TELEPHONE ENCOUNTER
----- Message from Verna Martin sent at 8/10/2023  4:21 PM CDT -----  Contact: Ruma  .Type:  RX Refill Request    Who Called: Ruma  Refill or New Rx:refill  RX Name and Strength:methotrexate 25 mg/mL injection  How is the patient currently taking it? (ex. 1XDay):as prescribed  Is this a 30 day or 90 day RX:30  Preferred Pharm[facility] with phone number:[facility]      Local or Mail Order:Local  Ordering Provider:DR. GRAJEDA  Would the patient rather a call back or a response via [facility]? Call  Best Call Back Number:.[phone]     Additional Information: Needs this refill as soon as possible. She does not have enough for dose on Saturday.

## 2023-08-14 ENCOUNTER — TELEPHONE (OUTPATIENT)
Dept: CARDIOLOGY | Facility: CLINIC | Age: 53
End: 2023-08-14
Payer: COMMERCIAL

## 2023-08-14 NOTE — TELEPHONE ENCOUNTER
----- Message from Francy Dykes sent at 8/14/2023  2:01 PM CDT -----  Contact: Patient  Type: Needs Medical Advice    Who Called:  Patient  What is this regarding?:  Pt needs her lab work an ultrasound scheduled to the same day after she gets back after the 8/24th.  Best Call Back Number:  [phone]  Additional Information:  Please call the patient back at the phone number listed above to advise. Thank you!

## 2023-08-18 LAB — CRYOGLOB SER QL: NORMAL

## 2023-08-31 ENCOUNTER — CLINICAL SUPPORT (OUTPATIENT)
Dept: CARDIOLOGY | Facility: HOSPITAL | Age: 53
End: 2023-08-31
Attending: INTERNAL MEDICINE
Payer: COMMERCIAL

## 2023-08-31 DIAGNOSIS — R09.89 BILATERAL CAROTID BRUITS: ICD-10-CM

## 2023-08-31 LAB
LEFT ARM DIASTOLIC BLOOD PRESSURE: 61 MMHG
LEFT ARM SYSTOLIC BLOOD PRESSURE: 99 MMHG
LEFT CBA DIAS: 12 CM/S
LEFT CBA SYS: 59 CM/S
LEFT CCA DIST DIAS: 15 CM/S
LEFT CCA DIST SYS: 70 CM/S
LEFT CCA MID DIAS: 15 CM/S
LEFT CCA MID SYS: 83 CM/S
LEFT CCA PROX DIAS: 19 CM/S
LEFT CCA PROX SYS: 92 CM/S
LEFT ECA DIAS: 10 CM/S
LEFT ECA SYS: 109 CM/S
LEFT ICA DIST DIAS: 28 CM/S
LEFT ICA DIST SYS: 82 CM/S
LEFT ICA MID DIAS: 20 CM/S
LEFT ICA MID SYS: 70 CM/S
LEFT ICA PROX DIAS: 20 CM/S
LEFT ICA PROX SYS: 82 CM/S
LEFT VERTEBRAL DIAS: 14 CM/S
LEFT VERTEBRAL SYS: 45 CM/S
OHS CV CAROTID RIGHT ICA EDV HIGHEST: 27
OHS CV CAROTID ULTRASOUND LEFT ICA/CCA RATIO: 1.17
OHS CV CAROTID ULTRASOUND RIGHT ICA/CCA RATIO: 1.34
OHS CV PV CAROTID LEFT HIGHEST CCA: 92
OHS CV PV CAROTID LEFT HIGHEST ICA: 82
OHS CV PV CAROTID RIGHT HIGHEST CCA: 85
OHS CV PV CAROTID RIGHT HIGHEST ICA: 87
OHS CV US CAROTID LEFT HIGHEST EDV: 28
RIGHT ARM DIASTOLIC BLOOD PRESSURE: 61 MMHG
RIGHT ARM SYSTOLIC BLOOD PRESSURE: 99 MMHG
RIGHT CBA DIAS: 16 CM/S
RIGHT CBA SYS: 62 CM/S
RIGHT CCA DIST DIAS: 18 CM/S
RIGHT CCA DIST SYS: 65 CM/S
RIGHT CCA MID DIAS: 21 CM/S
RIGHT CCA MID SYS: 81 CM/S
RIGHT CCA PROX DIAS: 19 CM/S
RIGHT CCA PROX SYS: 85 CM/S
RIGHT ECA DIAS: 16 CM/S
RIGHT ECA SYS: 121 CM/S
RIGHT ICA DIST DIAS: 27 CM/S
RIGHT ICA DIST SYS: 87 CM/S
RIGHT ICA MID DIAS: 22 CM/S
RIGHT ICA MID SYS: 80 CM/S
RIGHT ICA PROX DIAS: 22 CM/S
RIGHT ICA PROX SYS: 70 CM/S
RIGHT VERTEBRAL DIAS: 13 CM/S
RIGHT VERTEBRAL SYS: 55 CM/S

## 2023-08-31 PROCEDURE — 93880 CV US DOPPLER CAROTID (CUPID ONLY): ICD-10-PCS | Mod: 26,,, | Performed by: INTERNAL MEDICINE

## 2023-08-31 PROCEDURE — 93880 EXTRACRANIAL BILAT STUDY: CPT | Mod: PO

## 2023-08-31 PROCEDURE — 93880 EXTRACRANIAL BILAT STUDY: CPT | Mod: 26,,, | Performed by: INTERNAL MEDICINE

## 2023-09-14 ENCOUNTER — TELEPHONE (OUTPATIENT)
Dept: CARDIOLOGY | Facility: CLINIC | Age: 53
End: 2023-09-14
Payer: COMMERCIAL

## 2023-09-21 NOTE — TELEPHONE ENCOUNTER
----- Message from Miranda Davis PA-C sent at 6/12/2023  3:18 PM CDT -----  Please schedule thyroid ultrasound.  Discussed in clinic today for nodules seen on cervical MRI.  Needs to discuss results with pcp.   [place] Pregnancy And Lactation Text: There is insufficient data to evaluate drug-associated risk for major birth defects, miscarriage, or other adverse maternal or fetal outcomes.  There is a pregnancy registry that monitors pregnancy outcomes in pregnant persons exposed to the medication during pregnancy.  It is unknown if this medication is excreted in breast milk.  Do not breastfeed during treatment and for about 4 weeks after the last dose.

## 2023-10-13 ENCOUNTER — NURSE TRIAGE (OUTPATIENT)
Dept: ADMINISTRATIVE | Facility: CLINIC | Age: 53
End: 2023-10-13
Payer: COMMERCIAL

## 2023-10-13 NOTE — TELEPHONE ENCOUNTER
Patient c/o worsening of hearing loss. Advised per protocol to be seen within two weeks. Patient transferred to [facility]. Advised the patient to call back with any further questions or if symptoms worsen.        Reason for Disposition   ALL other adults with decreased hearing that has gradually decreased    Additional Information   Negative: Patient sounds very sick or weak to the triager   Negative: Ringing in the ears (tinnitus) and taking aspirin and dosage sounds high (i.e., > 1500 mg/day)   Negative: Hearing loss in one or both ears of sudden onset and present now   Negative: Earache   Negative: Decreased hearing followed sudden, extremely loud noise (e.g., explosion, not just loud concert)   Negative: Decreased hearing and taking medication that can damage hearing (i.e., gentamycin, tobramycin, furosemide, ethacrynic acid, cisplatin, quinidine)   Negative: Patient wants to be seen   Negative: Recurrent episodes of hearing loss, dizziness, and ringing in the ear   Negative: Tinnitus (ringing, hissing, beating) and only or mainly in 1 ear   Negative: Tinnitus (ringing, hissing, beating) and interferes with work, school, or sleep   Negative: Tinnitus (ringing, hissing, beating) and in both ears   Negative: Decreased hearing followed exposure to prolonged loud noise (e.g., concerts, headphones, work environment)   Negative: Decreased hearing following an ear infection   Negative: Decreased hearing (gradual) associated with aging   Negative: Decreased hearing in 1 ear and gradual onset    Protocols used: Hearing Loss-A-[place]

## 2023-10-20 ENCOUNTER — OFFICE VISIT (OUTPATIENT)
Dept: OTOLARYNGOLOGY | Facility: CLINIC | Age: 53
End: 2023-10-20
Payer: COMMERCIAL

## 2023-10-20 ENCOUNTER — CLINICAL SUPPORT (OUTPATIENT)
Dept: AUDIOLOGY | Facility: CLINIC | Age: 53
End: 2023-10-20
Payer: COMMERCIAL

## 2023-10-20 VITALS — HEIGHT: 65 IN | WEIGHT: 146.63 LBS | BODY MASS INDEX: 24.43 KG/M2

## 2023-10-20 DIAGNOSIS — M06.09 RHEUMATOID ARTHRITIS OF MULTIPLE SITES WITH NEGATIVE RHEUMATOID FACTOR: ICD-10-CM

## 2023-10-20 DIAGNOSIS — D68.61 ANTIPHOSPHOLIPID SYNDROME: ICD-10-CM

## 2023-10-20 DIAGNOSIS — H74.8X1 HEMOTYMPANUM, RIGHT: Primary | ICD-10-CM

## 2023-10-20 DIAGNOSIS — H74.8X1 HEMOTYMPANUM, RIGHT: ICD-10-CM

## 2023-10-20 DIAGNOSIS — H90.A31 MIXED CONDUCTIVE AND SENSORINEURAL HEARING LOSS OF RIGHT EAR WITH RESTRICTED HEARING OF LEFT EAR: ICD-10-CM

## 2023-10-20 DIAGNOSIS — H92.09 OTALGIA, UNSPECIFIED LATERALITY: ICD-10-CM

## 2023-10-20 DIAGNOSIS — H90.A22 SENSORINEURAL HEARING LOSS (SNHL) OF LEFT EAR WITH RESTRICTED HEARING OF RIGHT EAR: Primary | ICD-10-CM

## 2023-10-20 PROCEDURE — 92567 TYMPANOMETRY: CPT | Mod: S$GLB,,, | Performed by: AUDIOLOGIST

## 2023-10-20 PROCEDURE — 92557 COMPREHENSIVE HEARING TEST: CPT | Mod: S$GLB,,, | Performed by: AUDIOLOGIST

## 2023-10-20 PROCEDURE — 1159F PR MEDICATION LIST DOCUMENTED IN MEDICAL RECORD: ICD-10-PCS | Mod: CPTII,S$GLB,, | Performed by: STUDENT IN AN ORGANIZED HEALTH CARE EDUCATION/TRAINING PROGRAM

## 2023-10-20 PROCEDURE — 99999 PR PBB SHADOW E&M-EST. PATIENT-LVL I: CPT | Mod: PBBFAC,,, | Performed by: AUDIOLOGIST

## 2023-10-20 PROCEDURE — 92567 PR TYMPA2METRY: ICD-10-PCS | Mod: S$GLB,,, | Performed by: AUDIOLOGIST

## 2023-10-20 PROCEDURE — 99204 PR OFFICE/OUTPT VISIT, NEW, LEVL IV, 45-59 MIN: ICD-10-PCS | Mod: S$GLB,,, | Performed by: STUDENT IN AN ORGANIZED HEALTH CARE EDUCATION/TRAINING PROGRAM

## 2023-10-20 PROCEDURE — 1159F MED LIST DOCD IN RCRD: CPT | Mod: CPTII,S$GLB,, | Performed by: STUDENT IN AN ORGANIZED HEALTH CARE EDUCATION/TRAINING PROGRAM

## 2023-10-20 PROCEDURE — 3044F HG A1C LEVEL LT 7.0%: CPT | Mod: CPTII,S$GLB,, | Performed by: STUDENT IN AN ORGANIZED HEALTH CARE EDUCATION/TRAINING PROGRAM

## 2023-10-20 PROCEDURE — 99204 OFFICE O/P NEW MOD 45 MIN: CPT | Mod: S$GLB,,, | Performed by: STUDENT IN AN ORGANIZED HEALTH CARE EDUCATION/TRAINING PROGRAM

## 2023-10-20 PROCEDURE — 92557 PR COMPREHENSIVE HEARING TEST: ICD-10-PCS | Mod: S$GLB,,, | Performed by: AUDIOLOGIST

## 2023-10-20 PROCEDURE — 99999 PR PBB SHADOW E&M-EST. PATIENT-LVL III: ICD-10-PCS | Mod: PBBFAC,,, | Performed by: STUDENT IN AN ORGANIZED HEALTH CARE EDUCATION/TRAINING PROGRAM

## 2023-10-20 PROCEDURE — 3008F BODY MASS INDEX DOCD: CPT | Mod: CPTII,S$GLB,, | Performed by: STUDENT IN AN ORGANIZED HEALTH CARE EDUCATION/TRAINING PROGRAM

## 2023-10-20 PROCEDURE — 99999 PR PBB SHADOW E&M-EST. PATIENT-LVL I: ICD-10-PCS | Mod: PBBFAC,,, | Performed by: AUDIOLOGIST

## 2023-10-20 PROCEDURE — 3044F PR MOST RECENT HEMOGLOBIN A1C LEVEL <7.0%: ICD-10-PCS | Mod: CPTII,S$GLB,, | Performed by: STUDENT IN AN ORGANIZED HEALTH CARE EDUCATION/TRAINING PROGRAM

## 2023-10-20 PROCEDURE — 3008F PR BODY MASS INDEX (BMI) DOCUMENTED: ICD-10-PCS | Mod: CPTII,S$GLB,, | Performed by: STUDENT IN AN ORGANIZED HEALTH CARE EDUCATION/TRAINING PROGRAM

## 2023-10-20 PROCEDURE — 99999 PR PBB SHADOW E&M-EST. PATIENT-LVL III: CPT | Mod: PBBFAC,,, | Performed by: STUDENT IN AN ORGANIZED HEALTH CARE EDUCATION/TRAINING PROGRAM

## 2023-10-20 RX ORDER — PREDNISONE 20 MG/1
20 TABLET ORAL DAILY
Qty: 5 TABLET | Refills: 0 | Status: SHIPPED | OUTPATIENT
Start: 2023-10-20 | End: 2023-10-25

## 2023-10-20 NOTE — PROGRESS NOTES
Ruma Nguyen was seen 10/20/2023 for an audiological evaluation.      Pt reported a Hx of bilateral hearing loss without use of amplification.  She reported that her hearing decreased noticeably and that she felt bilateral aural pressure on a recent flight to [place].  Her hearing in her right ear has not improved since and she feels like she cannot hear anything from her right ear.      Otoscopy revealed clear view of both external ear canals and tympanic membranes.  No obstructive cerumen present in either ear canal.   Hemotympanum seen in the right ear.      Audiogram results revealed a severe-to-profound sensorineural hearing loss for the right ear and a moderate-to-severe sensorineural hearing loss for the left ear.  Speech testing could not be completed for the right ear due to the degree of hearing loss and was 55dBHL for the left ear.  Word recognition scores were  not tested  for the right ear and good for the left ear.   Tympanograms were Type B for the right ear and Type A for the left ear.    Audiogram results were reviewed in detail with patient and all questions were answered. Results will be reviewed by ENT and/or referring provider at the completion of this note.     Recommend ENT consult for medical management of middle ear pathology followed by a repeat audiogram prior to proceeding with amplification.

## 2023-10-20 NOTE — PROGRESS NOTES
Otolaryngology Clinic Note    Subjective:       Patient ID: Ruma Nguyen is a 52 y.o. female.    Chief Complaint: Hearing Loss      History of Present Illness: Ruma Nguyen is a 52 y.o. female presenting with right sided hearing loss after flight to [place] . Has baseline hearing loss, thinks started about 10 years old, reads lips. No hearing aids. Did try over the counter. No pain in right ear. Ear feels full on right. She has been popping the right ear. Was getting blood from her nose at first. No other sinuses issues. Is on plavix for recent stent. No issues like this before. Denies ringing. No infections or drainage.   She smokes 1/2 ppd. Working on that.   Got abx- doxy from PCP 10/10/23. No steroids.   Has arthritis on imuran and methotrexate.       Past Surgical History:   Procedure Laterality Date    ADENOIDECTOMY      ANGIOGRAM, CORONARY, WITH LEFT HEART CATHETERIZATION  2023    Procedure: Left Heart Cath;  Surgeon: Benjamin Bernal MD;  Location: [facility] CATH;  Service: Cardiology;;    BREAST SURGERY  2006    Augmentation     SECTION      COLONOSCOPY N/A 2020    Procedure: COLONOSCOPY;  Surgeon: Lalo De Leon MD;  Location: [facility] ENDO;  Service: Endoscopy;  Laterality: N/A;    CORONARY ANGIOGRAPHY  2023    Procedure: -;  Surgeon: Benjamin Bernla MD;  Location: [facility] CATH;  Service: Cardiology;;    HERNIA REPAIR      x 3    PERCUTANEOUS CORONARY INTERVENTION, ARTERY  2023    Procedure: RED LCX;  Surgeon: Benjamin Bernal MD;  Location: [facility] CATH;  Service: Cardiology;;    POLYPECTOMY      STENT, DRUG ELUTING, SINGLE VESSEL, CORONARY  2023    Procedure: -;  Surgeon: Benjamin Bernal MD;  Location: [facility] CATH;  Service: Cardiology;;    TUBAL LIGATION Bilateral      Past Medical History:   Diagnosis Date    Anticoagulant long-term use     Antiphospholipid syndrome     Cancer     skin, melanoma    Colon polyps     Heart murmur     Hyperlipidemia     Hypertension      PVD (peripheral vascular disease) with claudication 2023    RA (rheumatoid arthritis)     Radiculopathy     lumbar    Rheumatoid arthritis of multiple sites without rheumatoid factor     Thyroid nodule 2023     Social Determinants of Health     Tobacco Use: High Risk (10/20/2023)    Patient History     Smoking Tobacco Use: Every Day     Smokeless Tobacco Use: Never     Passive Exposure: Not on file   Alcohol Use: Not on file   Financial Resource Strain: Not on file   Food Insecurity: Not on file   Transportation Needs: Not on file   Physical Activity: Not on file   Stress: Not on file   Social Connections: Not on file   Housing Stability: Not on file   Depression: Medium Risk (1/3/2023)    Depression     Last PHQ-4: Flowsheet Data: 5     Review of patient's allergies indicates:  No Known Allergies  Current Outpatient Medications   Medication Instructions    albuterol (PROVENTIL) 2.5 mg /3 mL (0.083 %) nebulizer solution Inhale 3 mL 3 times a day by nebulization route for 30 days.    amLODIPine (NORVASC) 5 MG tablet TAKE 1 TABLET BY MOUTH EVERY DAY IN THE EVENING    amoxicillin (AMOXIL) 500 MG Tab SMARTSI Tablet(s) By Mouth    aspirin (ECOTRIN) 324 mg, Oral, Nightly    azaTHIOprine (IMURAN) 50 mg Tab 1 tablet, Oral, Daily    clonazePAM (KLONOPIN) 1 mg, Oral, Daily PRN    clopidogreL (PLAVIX) 75 mg, Oral, Daily    doxycycline (VIBRA-TABS) 100 mg, Oral, 2 times daily    folic acid (FOLVITE) 1 mg, Oral, Daily    hydroxychloroquine (PLAQUENIL) 200 mg, Oral, 2 times daily    methotrexate 15 mg, Intramuscular, Every 7 days    nitroGLYCERIN (NITROSTAT) 0.4 mg, Sublingual, Every 5 min PRN    predniSONE (DELTASONE) 20 mg, Oral, Daily    rosuvastatin (CRESTOR) 40 mg, Oral, Nightly    sertraline (ZOLOFT) 50 mg, Oral    traMADoL (ULTRAM) 50 mg, Oral, Every 24 hours as needed         ENT ROS negative except as stated above.     Patient answers are not available for this visit.            Objective:      There were no  vitals filed for this visit.    General: NAD, well appearing  Eyes: Normal conjunctiva and lids  Face: symmetric, nerve intact  Nose: The nose is without any evidence of any deformity. The nasal mucosa is moist. The septum is midline. There is no evidence of septal hematoma. The turbinates are without abnormality.   Ears: The ears are with normal-appearing pinna. Examination of the canals is normal appearing bilaterally. Hemotympanum on right, normal on left. Valsalva negative. Mclean to right. Hearing is grossly intact.  Mouth: No obvious abnormalities to the lips. The teeth are unremarkable. The gingivae are without any obvious evidence of infection or lesion. The oral mucosa is moist and pink. There are no obvious masses to the hard or soft palate.   Oropharynx: The uvula is midline.  The tongue is midline. The posterior pharynx is without erythema or exudate. The tonsils are normal appearing.  Salivary glands: The salivary glands are symmetric and not enlarged, no masses  Neck: No lymphadenopathy, trachea midline, thryoid not enlarged.  Psych: Normal mood and affect.   Neuro: Grossly intact  Speech: fluent    Test Review: I personally reviewed audio with BL baseline severe SNHL, with mixed to profound on right with Type B tymp         Assessment and Plan:       1. Hemotympanum, right    2. Otalgia, unspecified laterality    3. Mixed conductive and sensorineural hearing loss of right ear with restricted hearing of left ear          Will do steroid course now to try to clear right ear, plavix may have contributed to hemotympanum.   Will have her try to pop her ears     Would consider hearing aids. She is cleared for this based on nerve lines on audiogram when fluid clears. Reviewed her audio with her, and that right ear should go back to baseline once fluid clears.     RTC: 1 month recheck    Plan of care was discussed in detail with the patient, who agreed with the plan as above. All questions were answered in  detail.     Tiffanie Olson MD  Otolaryngology

## 2023-10-23 RX ORDER — HYDROXYCHLOROQUINE SULFATE 200 MG/1
200 TABLET, FILM COATED ORAL 2 TIMES DAILY
Qty: 180 TABLET | Refills: 1 | Status: SHIPPED | OUTPATIENT
Start: 2023-10-23

## 2023-11-06 ENCOUNTER — LAB VISIT (OUTPATIENT)
Dept: LAB | Facility: HOSPITAL | Age: 53
End: 2023-11-06
Attending: INTERNAL MEDICINE
Payer: COMMERCIAL

## 2023-11-06 DIAGNOSIS — D68.61 ANTIPHOSPHOLIPID SYNDROME: ICD-10-CM

## 2023-11-06 DIAGNOSIS — M06.09 RHEUMATOID ARTHRITIS OF MULTIPLE SITES WITH NEGATIVE RHEUMATOID FACTOR: ICD-10-CM

## 2023-11-06 LAB
ALBUMIN SERPL BCP-MCNC: 3.9 G/DL (ref 3.5–5.2)
ALP SERPL-CCNC: 83 U/L (ref 55–135)
ALT SERPL W/O P-5'-P-CCNC: 41 U/L (ref 10–44)
ANION GAP SERPL CALC-SCNC: 9 MMOL/L (ref 8–16)
AST SERPL-CCNC: 28 U/L (ref 10–40)
BASOPHILS # BLD AUTO: 0.06 K/UL (ref 0–0.2)
BASOPHILS NFR BLD: 1 % (ref 0–1.9)
BILIRUB SERPL-MCNC: 0.4 MG/DL (ref 0.1–1)
BUN SERPL-MCNC: 7 MG/DL (ref 6–20)
CALCIUM SERPL-MCNC: 9.1 MG/DL (ref 8.7–10.5)
CHLORIDE SERPL-SCNC: 108 MMOL/L (ref 95–110)
CO2 SERPL-SCNC: 27 MMOL/L (ref 23–29)
CREAT SERPL-MCNC: 0.8 MG/DL (ref 0.5–1.4)
CRP SERPL-MCNC: 2.1 MG/L (ref 0–8.2)
DIFFERENTIAL METHOD: ABNORMAL
EOSINOPHIL # BLD AUTO: 0.2 K/UL (ref 0–0.5)
EOSINOPHIL NFR BLD: 3.5 % (ref 0–8)
ERYTHROCYTE [DISTWIDTH] IN BLOOD BY AUTOMATED COUNT: 13.5 % (ref 11.5–14.5)
ERYTHROCYTE [SEDIMENTATION RATE] IN BLOOD BY PHOTOMETRIC METHOD: 5 MM/HR (ref 0–36)
EST. GFR  (NO RACE VARIABLE): >60 ML/MIN/1.73 M^2
GLUCOSE SERPL-MCNC: 88 MG/DL (ref 70–110)
HCT VFR BLD AUTO: 40.1 % (ref 37–48.5)
HGB BLD-MCNC: 13.8 G/DL (ref 12–16)
IMM GRANULOCYTES # BLD AUTO: 0.02 K/UL (ref 0–0.04)
IMM GRANULOCYTES NFR BLD AUTO: 0.3 % (ref 0–0.5)
LYMPHOCYTES # BLD AUTO: 2.1 K/UL (ref 1–4.8)
LYMPHOCYTES NFR BLD: 32.9 % (ref 18–48)
MCH RBC QN AUTO: 36 PG (ref 27–31)
MCHC RBC AUTO-ENTMCNC: 34.4 G/DL (ref 32–36)
MCV RBC AUTO: 105 FL (ref 82–98)
MONOCYTES # BLD AUTO: 0.3 K/UL (ref 0.3–1)
MONOCYTES NFR BLD: 5.3 % (ref 4–15)
NEUTROPHILS # BLD AUTO: 3.6 K/UL (ref 1.8–7.7)
NEUTROPHILS NFR BLD: 57 % (ref 38–73)
NRBC BLD-RTO: 0 /100 WBC
PLATELET # BLD AUTO: 205 K/UL (ref 150–450)
PMV BLD AUTO: 8.8 FL (ref 9.2–12.9)
POTASSIUM SERPL-SCNC: 4 MMOL/L (ref 3.5–5.1)
PROT SERPL-MCNC: 6.8 G/DL (ref 6–8.4)
RBC # BLD AUTO: 3.83 M/UL (ref 4–5.4)
SODIUM SERPL-SCNC: 144 MMOL/L (ref 136–145)
WBC # BLD AUTO: 6.24 K/UL (ref 3.9–12.7)

## 2023-11-06 PROCEDURE — 85025 COMPLETE CBC W/AUTO DIFF WBC: CPT | Mod: PO | Performed by: INTERNAL MEDICINE

## 2023-11-06 PROCEDURE — 36415 COLL VENOUS BLD VENIPUNCTURE: CPT | Mod: PO | Performed by: INTERNAL MEDICINE

## 2023-11-06 PROCEDURE — 80053 COMPREHEN METABOLIC PANEL: CPT | Mod: PO | Performed by: INTERNAL MEDICINE

## 2023-11-06 PROCEDURE — 86140 C-REACTIVE PROTEIN: CPT | Performed by: INTERNAL MEDICINE

## 2023-11-06 PROCEDURE — 85652 RBC SED RATE AUTOMATED: CPT | Performed by: INTERNAL MEDICINE

## 2023-11-08 ENCOUNTER — OFFICE VISIT (OUTPATIENT)
Dept: OPTOMETRY | Facility: CLINIC | Age: 53
End: 2023-11-08
Payer: COMMERCIAL

## 2023-11-08 DIAGNOSIS — H52.13 MYOPIA WITH ASTIGMATISM AND PRESBYOPIA, BILATERAL: ICD-10-CM

## 2023-11-08 DIAGNOSIS — M06.09 RHEUMATOID ARTHRITIS OF MULTIPLE SITES WITH NEGATIVE RHEUMATOID FACTOR: ICD-10-CM

## 2023-11-08 DIAGNOSIS — H52.203 MYOPIA WITH ASTIGMATISM AND PRESBYOPIA, BILATERAL: ICD-10-CM

## 2023-11-08 DIAGNOSIS — H43.393 VITREOUS FLOATERS OF BOTH EYES: Primary | ICD-10-CM

## 2023-11-08 DIAGNOSIS — H52.4 MYOPIA WITH ASTIGMATISM AND PRESBYOPIA, BILATERAL: ICD-10-CM

## 2023-11-08 DIAGNOSIS — Z79.899 LONG-TERM USE OF PLAQUENIL: ICD-10-CM

## 2023-11-08 PROCEDURE — 92134 PR COMPUTERIZED OPHTHALMIC IMAGING RETINA: ICD-10-PCS | Mod: 26,S$PBB,,

## 2023-11-08 PROCEDURE — 92134 CPTRZ OPH DX IMG PST SGM RTA: CPT | Mod: PBBFAC,PO

## 2023-11-08 PROCEDURE — 99999 PR PBB SHADOW E&M-EST. PATIENT-LVL III: CPT | Mod: PBBFAC,,,

## 2023-11-08 PROCEDURE — 99204 OFFICE O/P NEW MOD 45 MIN: CPT | Mod: S$PBB,,,

## 2023-11-08 PROCEDURE — 99204 PR OFFICE/OUTPT VISIT, NEW, LEVL IV, 45-59 MIN: ICD-10-PCS | Mod: S$PBB,,,

## 2023-11-08 PROCEDURE — 99999 PR PBB SHADOW E&M-EST. PATIENT-LVL III: ICD-10-PCS | Mod: PBBFAC,,,

## 2023-11-08 PROCEDURE — 92134 CPTRZ OPH DX IMG PST SGM RTA: CPT | Mod: 26,S$PBB,,

## 2023-11-08 PROCEDURE — 92015 PR REFRACTION: ICD-10-PCS | Mod: S$GLB,,,

## 2023-11-08 PROCEDURE — 92015 DETERMINE REFRACTIVE STATE: CPT | Mod: S$GLB,,,

## 2023-11-08 NOTE — PROGRESS NOTES
"HPI    Pt here for routine exam IRIS 2-3 years ago    Pt complains of blurred distance vision. Pt taking plaquenil. Pt notices   strain in eyes while watching TV. Pt notices floaters and flashes (star   bursts). Pt mentioned 3 years ago vision blacked out and regained vision   in "curtain" like shadow.   Last edited by Marleny Zaidi on 11/8/2023  3:09 PM.            Assessment /Plan     For exam results, see Encounter Report.    Vitreous floaters of both eyes    Rheumatoid arthritis of multiple sites with negative rheumatoid factor  -     Posterior Segment OCT Retina-Both eyes  -     Bunch Visual Field - OU - Extended - Both Eyes; Future; Expected date: 12/08/2023    Long-term use of Plaquenil  -     Posterior Segment OCT Retina-Both eyes  -     Bunch Visual Field - OU - Extended - Both Eyes; Future; Expected date: 12/08/2023    Myopia with astigmatism and presbyopia, bilateral      History of transient vision loss OS. Pt states she was checked out by an optometrist who said everything looked okay; etiology was ultimately determined to be blockages in arteries. No pathology on fundus examination OD, OS. No evidence of previous ischemic or hemorrhagic event OD, OS. BCVA OS 20/30. Reviewed signs and symptoms of a retinal detachment thoroughly and ed pt to RTC asap if experienced.    2-3.  Ed pt on the potential ocular side effects associated with long-term Plaquenil use. Baseline testing today revealed no signs of bull's eye maculopathy on fundus examination or Mac OCT. Pt to return for baseline 10-2 HVF. Ed pt if any changes in vision noticed to call or message asap. Continue to monitor yearly with repeat testing.     4. Discussed spectacle options with pt and released final spec rx. Ed pt on change in rx and adaptation.      RTC: 1 year for comprehensive exam or sooner prn                 "

## 2023-11-13 ENCOUNTER — OFFICE VISIT (OUTPATIENT)
Dept: CARDIOLOGY | Facility: CLINIC | Age: 53
End: 2023-11-13
Payer: COMMERCIAL

## 2023-11-13 ENCOUNTER — TELEPHONE (OUTPATIENT)
Dept: OTOLARYNGOLOGY | Facility: CLINIC | Age: 53
End: 2023-11-13
Payer: COMMERCIAL

## 2023-11-13 VITALS
HEIGHT: 65 IN | BODY MASS INDEX: 24.57 KG/M2 | SYSTOLIC BLOOD PRESSURE: 128 MMHG | DIASTOLIC BLOOD PRESSURE: 70 MMHG | WEIGHT: 147.5 LBS | HEART RATE: 63 BPM

## 2023-11-13 DIAGNOSIS — Z71.6 TOBACCO ABUSE COUNSELING: Chronic | ICD-10-CM

## 2023-11-13 DIAGNOSIS — I25.10 CORONARY ARTERY DISEASE INVOLVING NATIVE CORONARY ARTERY OF NATIVE HEART WITHOUT ANGINA PECTORIS: Primary | Chronic | ICD-10-CM

## 2023-11-13 DIAGNOSIS — I73.9 PVD (PERIPHERAL VASCULAR DISEASE) WITH CLAUDICATION: Chronic | ICD-10-CM

## 2023-11-13 DIAGNOSIS — I08.0 MILD MITRAL AND AORTIC REGURGITATION: Chronic | ICD-10-CM

## 2023-11-13 DIAGNOSIS — Z79.02 LONG TERM (CURRENT) USE OF ANTITHROMBOTICS/ANTIPLATELETS: Chronic | ICD-10-CM

## 2023-11-13 DIAGNOSIS — I77.9 MILD CAROTID ARTERY DISEASE: ICD-10-CM

## 2023-11-13 PROCEDURE — 1159F PR MEDICATION LIST DOCUMENTED IN MEDICAL RECORD: ICD-10-PCS | Mod: CPTII,S$GLB,, | Performed by: INTERNAL MEDICINE

## 2023-11-13 PROCEDURE — 99214 PR OFFICE/OUTPT VISIT, EST, LEVL IV, 30-39 MIN: ICD-10-PCS | Mod: S$GLB,,, | Performed by: INTERNAL MEDICINE

## 2023-11-13 PROCEDURE — 99999 PR PBB SHADOW E&M-EST. PATIENT-LVL IV: CPT | Mod: PBBFAC,,, | Performed by: INTERNAL MEDICINE

## 2023-11-13 PROCEDURE — 3008F PR BODY MASS INDEX (BMI) DOCUMENTED: ICD-10-PCS | Mod: CPTII,S$GLB,, | Performed by: INTERNAL MEDICINE

## 2023-11-13 PROCEDURE — 99999 PR PBB SHADOW E&M-EST. PATIENT-LVL IV: ICD-10-PCS | Mod: PBBFAC,,, | Performed by: INTERNAL MEDICINE

## 2023-11-13 PROCEDURE — 99214 OFFICE O/P EST MOD 30 MIN: CPT | Mod: S$GLB,,, | Performed by: INTERNAL MEDICINE

## 2023-11-13 PROCEDURE — 3078F DIAST BP <80 MM HG: CPT | Mod: CPTII,S$GLB,, | Performed by: INTERNAL MEDICINE

## 2023-11-13 PROCEDURE — 3074F PR MOST RECENT SYSTOLIC BLOOD PRESSURE < 130 MM HG: ICD-10-PCS | Mod: CPTII,S$GLB,, | Performed by: INTERNAL MEDICINE

## 2023-11-13 PROCEDURE — 3078F PR MOST RECENT DIASTOLIC BLOOD PRESSURE < 80 MM HG: ICD-10-PCS | Mod: CPTII,S$GLB,, | Performed by: INTERNAL MEDICINE

## 2023-11-13 PROCEDURE — 3008F BODY MASS INDEX DOCD: CPT | Mod: CPTII,S$GLB,, | Performed by: INTERNAL MEDICINE

## 2023-11-13 PROCEDURE — 3074F SYST BP LT 130 MM HG: CPT | Mod: CPTII,S$GLB,, | Performed by: INTERNAL MEDICINE

## 2023-11-13 PROCEDURE — 3044F HG A1C LEVEL LT 7.0%: CPT | Mod: CPTII,S$GLB,, | Performed by: INTERNAL MEDICINE

## 2023-11-13 PROCEDURE — 3044F PR MOST RECENT HEMOGLOBIN A1C LEVEL <7.0%: ICD-10-PCS | Mod: CPTII,S$GLB,, | Performed by: INTERNAL MEDICINE

## 2023-11-13 PROCEDURE — 1159F MED LIST DOCD IN RCRD: CPT | Mod: CPTII,S$GLB,, | Performed by: INTERNAL MEDICINE

## 2023-11-13 NOTE — TELEPHONE ENCOUNTER
Pt called to say that her cardiologist has ok'd her to stop Plavix for 6-7 days for a procedure. Pt is ready to get the procedure scheduled. Please advise of procedure and if clinic or OR.

## 2023-11-13 NOTE — PROGRESS NOTES
Subjective:    Patient ID:  Ruma Nguyen is a 52 y.o. female who presents for Follow-up (F/U HAS QUESTIONS IN REGARDS TO HER THYROID CANCER) and Coronary Artery Disease        HPI    DISCUSSED TESTS, LDL 46, CAROTID ULTRASOUND WITH MILD RIGHT SIDED PLAQUE, RECENT CMP NORMAL, R EAR DRUM PERFORATION AFTER CYN FLIGHT,  ALSO DIAGNOSED WITH THYROID CANCER, NEEDS RELATIVELY URGENT SURGERY, NO CP/ SOB, STILL SMOKES, NO TIA TYPE SYMPTOMS NO NEAR-SYNCOPE, SEE ROS  Past Medical History:   Diagnosis Date    Anticoagulant long-term use     Antiphospholipid syndrome     Cancer     skin, melanoma    Colon polyps     Heart murmur     Hyperlipidemia     Hypertension     PVD (peripheral vascular disease) with claudication 2023    RA (rheumatoid arthritis)     Radiculopathy     lumbar    Rheumatoid arthritis of multiple sites without rheumatoid factor     Thyroid nodule 2023     Past Surgical History:   Procedure Laterality Date    ADENOIDECTOMY      ANGIOGRAM, CORONARY, WITH LEFT HEART CATHETERIZATION  2023    Procedure: Left Heart Cath;  Surgeon: Benjamin Bernal MD;  Location: [facility] CATH;  Service: Cardiology;;    BREAST SURGERY      Augmentation     SECTION      COLONOSCOPY N/A 2020    Procedure: COLONOSCOPY;  Surgeon: Lalo De Leon MD;  Location: [facility] ENDO;  Service: Endoscopy;  Laterality: N/A;    CORONARY ANGIOGRAPHY  2023    Procedure: -;  Surgeon: Benjamin Bernal MD;  Location: [facility] CATH;  Service: Cardiology;;    HERNIA REPAIR      x 3    PERCUTANEOUS CORONARY INTERVENTION, ARTERY  2023    Procedure: RED LCX;  Surgeon: Benjamin Bernal MD;  Location: [facility] CATH;  Service: Cardiology;;    POLYPECTOMY      STENT, DRUG ELUTING, SINGLE VESSEL, CORONARY  2023    Procedure: -;  Surgeon: Benjamin Bernal MD;  Location: [facility] CATH;  Service: Cardiology;;    TUBAL LIGATION Bilateral      Family History   Problem Relation Age of Onset    Heart disease Mother     Hypertension Mother      Cancer Father     Colon cancer Father     No Known Problems Sister     Diabetes Brother     Kidney cancer Brother      Social History     Socioeconomic History    Marital status:    Tobacco Use    Smoking status: Every Day     Current packs/day: 0.75     Average packs/day: 0.8 packs/day for 38.0 years (28.5 ttl pk-yrs)     Types: Cigarettes    Smokeless tobacco: Never    Tobacco comments:     Needs help to quit; had problems getting chantix   Substance and Sexual Activity    Alcohol use: No    Drug use: No    Sexual activity: Yes     Partners: Male       Review of patient's allergies indicates:  No Known Allergies    Current Outpatient Medications:     albuterol (PROVENTIL) 2.5 mg /3 mL (0.083 %) nebulizer solution, Inhale 3 mL 3 times a day by nebulization route for 30 days., Disp: , Rfl:     amLODIPine (NORVASC) 5 MG tablet, TAKE 1 TABLET BY MOUTH EVERY DAY IN THE EVENING, Disp: 90 tablet, Rfl: 1    amoxicillin (AMOXIL) 500 MG Tab, SMARTSI Tablet(s) By Mouth, Disp: , Rfl:     aspirin (ECOTRIN) 81 MG EC tablet, Take 324 mg by mouth every evening., Disp: , Rfl:     azaTHIOprine (IMURAN) 50 mg Tab, Take 1 tablet by mouth once daily., Disp: , Rfl:     clonazePAM (KLONOPIN) 1 MG tablet, Take 1 tablet (1 mg total) by mouth daily as needed for Anxiety. (Patient taking differently: Take 1 mg by mouth 2 (two) times daily as needed for Anxiety.), Disp: 30 tablet, Rfl: 1    clopidogreL (PLAVIX) 75 mg tablet, Take 1 tablet (75 mg total) by mouth once daily., Disp: 90 tablet, Rfl: 1    folic acid (FOLVITE) 1 MG tablet, Take 1 tablet (1 mg total) by mouth once daily. (Patient taking differently: Take 1 mg by mouth every evening.), Disp: 90 tablet, Rfl: 2    hydroxychloroquine (PLAQUENIL) 200 mg tablet, Take 1 tablet (200 mg total) by mouth 2 (two) times daily., Disp: 180 tablet, Rfl: 1    methotrexate 25 mg/mL injection, Inject 0.6 mLs (15 mg total) into the muscle every 7 days., Disp: 2 mL, Rfl: 3     "nitroGLYCERIN (NITROSTAT) 0.4 MG SL tablet, Place 1 tablet (0.4 mg total) under the tongue every 5 (five) minutes as needed for Chest pain., Disp: 25 tablet, Rfl: 0    rosuvastatin (CRESTOR) 40 MG Tab, Take 1 tablet (40 mg total) by mouth every evening., Disp: 90 tablet, Rfl: 1    sertraline (ZOLOFT) 50 MG tablet, Take 50 mg by mouth., Disp: , Rfl:     traMADoL (ULTRAM) 50 mg tablet, Take 1 tablet (50 mg total) by mouth every 24 hours as needed for Pain., Disp: 30 tablet, Rfl: 2    Review of Systems   Constitutional: Negative for weight gain and weight loss.   HENT:  Positive for ear pain and hearing loss (SINCE CHILDHOOD). Negative for congestion and sore throat.    Eyes:  Negative for blurred vision and redness.   Cardiovascular:  Negative for chest pain, claudication (MILD), cyanosis, dyspnea on exertion, irregular heartbeat, leg swelling (ANKLES), near-syncope, orthopnea (no PND), palpitations, paroxysmal nocturnal dyspnea and syncope.   Respiratory:  Negative for cough, hemoptysis and wheezing.    Endocrine: Negative.    Hematologic/Lymphatic: Negative for adenopathy. Bruises/bleeds easily: OCC.   Skin: Negative.    Musculoskeletal:  Positive for back pain. Negative for joint pain, muscle cramps and myalgias.   Gastrointestinal:  Negative for abdominal pain, dysphagia, melena, nausea and vomiting.   Genitourinary:  Negative for dysuria and flank pain.   Neurological:  Negative for dizziness, focal weakness, light-headedness, loss of balance and paresthesias (NEUROPATHY).   Psychiatric/Behavioral:  Negative for altered mental status and depression.    Allergic/Immunologic: Negative for hives and persistent infections.        Objective:      Vitals:    11/13/23 1033 11/13/23 1049   BP: (!) 144/71 128/70   Pulse: 63    Weight: 66.9 kg (147 lb 7.8 oz)    Height: 5' 5" (1.651 m)    PainSc: 0-No pain      Body mass index is 24.54 kg/m².    Physical Exam  Constitutional:       Appearance: Normal appearance.   HENT:    "   Head: Normocephalic and atraumatic.   Eyes:      General: No scleral icterus.     Extraocular Movements: Extraocular movements intact.   Cardiovascular:      Rate and Rhythm: Normal rate. No extrasystoles are present.     Pulses:           Carotid pulses are 2+ on the right side with bruit and 2+ on the left side with bruit.       Radial pulses are 2+ on the right side and 2+ on the left side.        Posterior tibial pulses are 2+ on the right side and 2+ on the left side.      Heart sounds: Murmur heard.      Systolic murmur is present with a grade of 1/6 at the upper right sternal border.      Diastolic murmur is present with a grade of 1/4 at the upper right sternal border.      No friction rub. No gallop.   Pulmonary:      Effort: Pulmonary effort is normal.      Breath sounds: Normal breath sounds and air entry.   Abdominal:      Palpations: Abdomen is soft.      Tenderness: There is no abdominal tenderness.   Musculoskeletal:      Cervical back: Neck supple.      Right lower leg: No edema.      Left lower leg: No edema.   Skin:     Capillary Refill: Capillary refill takes less than 2 seconds.   Neurological:      General: No focal deficit present.      Mental Status: She is alert and oriented to person, place, and time.      Cranial Nerves: Cranial nerve deficit ([place]) present.   Psychiatric:         Mood and Affect: Mood normal.         Speech: Speech normal.         Behavior: Behavior normal.         Cognition and Memory: Cognition normal.                 ..    Chemistry        Component Value Date/Time     11/06/2023 1426     04/11/2019 1152    K 4.0 11/06/2023 1426     11/06/2023 1426     04/11/2019 1152    CO2 27 11/06/2023 1426    BUN 7 11/06/2023 1426    CREATININE 0.8 11/06/2023 1426    CREATININE 0.71 04/11/2019 1152    GLU 88 11/06/2023 1426    GLU 96 04/11/2019 1152        Component Value Date/Time    CALCIUM 9.1 11/06/2023 1426    ALKPHOS 83 11/06/2023 1426    AST 28  11/06/2023 1426    AST 18 01/27/2016 0915    ALT 41 11/06/2023 1426    BILITOT 0.4 11/06/2023 1426    ESTGFRAFRICA >60 06/05/2020 1457    EGFRNONAA >60 06/05/2020 1457            ..  Lab Results   Component Value Date    CHOL 93 (L) 08/31/2023    CHOL 192 05/01/2023    CHOL 150 01/27/2016     Lab Results   Component Value Date    HDL 27 (L) 08/31/2023    HDL 32 (L) 05/01/2023    HDL 31 (L) 01/27/2016     Lab Results   Component Value Date    LDLCALC 46.4 (L) 08/31/2023    LDLCALC 123.4 05/01/2023    LDLCALC 72.4 01/27/2016     Lab Results   Component Value Date    TRIG 98 08/31/2023    TRIG 183 (H) 05/01/2023    TRIG 233 (H) 01/27/2016     Lab Results   Component Value Date    CHOLHDL 29.0 08/31/2023    CHOLHDL 16.7 (L) 05/01/2023    CHOLHDL 20.7 01/27/2016     ..  Lab Results   Component Value Date    WBC 6.24 11/06/2023    HGB 13.8 11/06/2023    HCT 40.1 11/06/2023     (H) 11/06/2023     11/06/2023       Test(s) Reviewed  I have reviewed the following in detail:  [] Stress test   [] Angiography   [] Echocardiogram   [x] Labs   [x] Other:       Assessment:         ICD-10-CM ICD-9-CM   1. Coronary artery disease involving native coronary artery of native heart without angina pectoris  I25.10 414.01   2. Mild mitral and aortic regurgitation  I08.0 396.3   3. Long term (current) use of antithrombotics/antiplatelets  Z79.02 V58.63   4. Tobacco abuse counseling  Z71.6 V65.42     305.1   5. Mild carotid artery disease  I77.9 447.9   6. PVD (peripheral vascular disease) with claudication  I73.9 443.9     Problem List Items Addressed This Visit          Cardiac/Vascular    Mild mitral and aortic regurgitation    Coronary artery disease involving native coronary artery of native heart without angina pectoris - Primary    PVD (peripheral vascular disease) with claudication    Relevant Orders    CV Ultrasound doppler arterial legs bilat    Mild carotid artery disease       Hematology    Long term (current) use  of antithrombotics/antiplatelets       Other    Tobacco use        Plan:     ACCEPTABLE CV RISK FOR THYROID CANCER SURGERY WHICH RELATIVELY URGENT, OK TO HOLD PLAVIX 5-7 DAYS BEFORE THYROID SURGERY, TOBACCO CESSATION EXTENSIVE COUNSELING,ALL CV CLINICALLY STABLE, NO ANGINA, NO HF, NO TIA, NO CLINICAL ARRHYTHMIA,CONTINUE CURRENT MEDS, EDUCATION, DIET, EXERCISE WALKING EXERCISE RETURN TO CLINIC IN 6 MONTHS WITH LABS ARTERIAL DOPPLER REASSESS PERIPHERAL VASCULAR DISEASE HAS MODERATE DISEASE, EXPLAINED EFFECT OF SMOKING ON PERIPHERAL VASCULAR DISEASE, PATIENT UNDERSTANDS, ALL QUESTIONS ANSWERED DISCUSSED PLAN WITH THE PATIENT AND HER ,      Coronary artery disease involving native coronary artery of native heart without angina pectoris    Mild mitral and aortic regurgitation    Long term (current) use of antithrombotics/antiplatelets    Tobacco abuse counseling    Mild carotid artery disease    PVD (peripheral vascular disease) with claudication  -     CV Ultrasound doppler arterial legs bilat; Future    RTC Low level/low impact aerobic exercise 5x's/wk. Heart healthy diet and risk factor modification.    See labs and med orders.    Aerobic exercise 5x's/wk. Heart healthy diet and risk factor modification.    See labs and med orders.

## 2023-11-13 NOTE — TELEPHONE ENCOUNTER
----- Message from Barby Mendoza sent at 11/13/2023 12:37 PM CST -----  Contact: pt [phone]  Type: Needs Medical Advice  Who Called:  Pt     Best Call Back Number: [phone]    Additional Information: Pt requesting a call back to schedule a procedure and to discuss her plavix. Pls call back and advise

## 2023-11-14 NOTE — TELEPHONE ENCOUNTER
She is scheduled to see me on the 21st, can do a myringotomy then if needed. She can stop her plavix on Friday and I'll have her stay off 2 days after if procedure is needed in clinic.

## 2023-11-21 ENCOUNTER — OFFICE VISIT (OUTPATIENT)
Dept: OTOLARYNGOLOGY | Facility: CLINIC | Age: 53
End: 2023-11-21
Payer: COMMERCIAL

## 2023-11-21 VITALS — BODY MASS INDEX: 24.68 KG/M2 | WEIGHT: 148.13 LBS | HEIGHT: 65 IN

## 2023-11-21 DIAGNOSIS — H65.491 CHRONIC OTITIS MEDIA OF RIGHT EAR WITH EFFUSION: Primary | ICD-10-CM

## 2023-11-21 DIAGNOSIS — H90.A31 MIXED CONDUCTIVE AND SENSORINEURAL HEARING LOSS OF RIGHT EAR WITH RESTRICTED HEARING OF LEFT EAR: ICD-10-CM

## 2023-11-21 PROCEDURE — 99999 PR PBB SHADOW E&M-EST. PATIENT-LVL III: CPT | Mod: PBBFAC,,, | Performed by: STUDENT IN AN ORGANIZED HEALTH CARE EDUCATION/TRAINING PROGRAM

## 2023-11-21 PROCEDURE — 99214 OFFICE O/P EST MOD 30 MIN: CPT | Mod: 25,S$PBB,ICN, | Performed by: STUDENT IN AN ORGANIZED HEALTH CARE EDUCATION/TRAINING PROGRAM

## 2023-11-21 PROCEDURE — 69420 PR INCISION EARDRUM,ASPIR: ICD-10-PCS | Mod: S$PBB,RT,ICN, | Performed by: STUDENT IN AN ORGANIZED HEALTH CARE EDUCATION/TRAINING PROGRAM

## 2023-11-21 PROCEDURE — 69420 INCISION OF EARDRUM: CPT | Mod: S$PBB,RT,ICN, | Performed by: STUDENT IN AN ORGANIZED HEALTH CARE EDUCATION/TRAINING PROGRAM

## 2023-11-21 PROCEDURE — 99214 PR OFFICE/OUTPT VISIT, EST, LEVL IV, 30-39 MIN: ICD-10-PCS | Mod: 25,S$PBB,ICN, | Performed by: STUDENT IN AN ORGANIZED HEALTH CARE EDUCATION/TRAINING PROGRAM

## 2023-11-21 PROCEDURE — 99999 PR PBB SHADOW E&M-EST. PATIENT-LVL III: ICD-10-PCS | Mod: PBBFAC,,, | Performed by: STUDENT IN AN ORGANIZED HEALTH CARE EDUCATION/TRAINING PROGRAM

## 2023-11-21 PROCEDURE — 69420 INCISION OF EARDRUM: CPT | Mod: PBBFAC,PO | Performed by: STUDENT IN AN ORGANIZED HEALTH CARE EDUCATION/TRAINING PROGRAM

## 2023-11-21 RX ORDER — FLUTICASONE PROPIONATE 50 MCG
1 SPRAY, SUSPENSION (ML) NASAL 2 TIMES DAILY
Qty: 16 G | Refills: 11 | Status: SHIPPED | OUTPATIENT
Start: 2023-11-21 | End: 2024-11-20

## 2023-11-21 NOTE — PROGRESS NOTES
Otolaryngology Clinic Note    Subjective:       Patient ID: Ruma Nguyen is a 52 y.o. female.    Chief Complaint: Ear Drainage and Hearing Loss      History of Present Illness: Ruma Nguyen is a 52 y.o. female presenting with right sided hearing loss after flight to [place] . Has baseline hearing loss, thinks started about 10 years old, reads lips. No hearing aids. Did try over the counter. No pain in right ear. Ear feels full on right. She has been popping the right ear. Was getting blood from her nose at first. No other sinuses issues. Is on plavix for recent stent. No issues like this before. Denies ringing. No infections or drainage.   She smokes 1/2 ppd. Working on that.   Got abx- doxy from PCP 10/10/23. No steroids.   Has arthritis on imuran and methotrexate.     23: RTC, hearing not better on right, feels like echoing, full. Have been off plavix and aspirin for 3 days.     Past Surgical History:   Procedure Laterality Date    ADENOIDECTOMY      ANGIOGRAM, CORONARY, WITH LEFT HEART CATHETERIZATION  2023    Procedure: Left Heart Cath;  Surgeon: Benjamin Bernal MD;  Location: [facility] CATH;  Service: Cardiology;;    BREAST SURGERY  2006    Augmentation     SECTION      COLONOSCOPY N/A 2020    Procedure: COLONOSCOPY;  Surgeon: Lalo De Leon MD;  Location: [facility] ENDO;  Service: Endoscopy;  Laterality: N/A;    CORONARY ANGIOGRAPHY  2023    Procedure: -;  Surgeon: Benjamin Bernal MD;  Location: [facility] CATH;  Service: Cardiology;;    HERNIA REPAIR      x 3    PERCUTANEOUS CORONARY INTERVENTION, ARTERY  2023    Procedure: RED LCX;  Surgeon: Benjamin Bernal MD;  Location: [facility] CATH;  Service: Cardiology;;    POLYPECTOMY      STENT, DRUG ELUTING, SINGLE VESSEL, CORONARY  2023    Procedure: -;  Surgeon: Benjamin Bernal MD;  Location: [facility] CATH;  Service: Cardiology;;    TUBAL LIGATION Bilateral      Past Medical History:   Diagnosis Date    Anticoagulant  long-term use     Antiphospholipid syndrome     Cancer     skin, melanoma    Colon polyps     Heart murmur     Hyperlipidemia     Hypertension     PVD (peripheral vascular disease) with claudication 2023    RA (rheumatoid arthritis)     Radiculopathy     lumbar    Rheumatoid arthritis of multiple sites without rheumatoid factor     Thyroid nodule 2023     Social Determinants of Health     Tobacco Use: High Risk (2023)    Patient History     Smoking Tobacco Use: Every Day     Smokeless Tobacco Use: Never     Passive Exposure: Not on file   Alcohol Use: Not on file   Financial Resource Strain: Not on file   Food Insecurity: Not on file   Transportation Needs: Not on file   Physical Activity: Not on file   Stress: Not on file   Social Connections: Not on file   Housing Stability: Not on file   Depression: Medium Risk (1/3/2023)    Depression     Last PHQ-4: Flowsheet Data: 5     Review of patient's allergies indicates:  No Known Allergies  Current Outpatient Medications   Medication Instructions    albuterol (PROVENTIL) 2.5 mg /3 mL (0.083 %) nebulizer solution Inhale 3 mL 3 times a day by nebulization route for 30 days.    amLODIPine (NORVASC) 5 MG tablet TAKE 1 TABLET BY MOUTH EVERY DAY IN THE EVENING    amoxicillin (AMOXIL) 500 MG Tab SMARTSI Tablet(s) By Mouth    aspirin (ECOTRIN) 324 mg, Oral, Nightly    azaTHIOprine (IMURAN) 50 mg Tab 1 tablet, Oral, Daily    clonazePAM (KLONOPIN) 1 mg, Oral, Daily PRN    clopidogreL (PLAVIX) 75 mg, Oral, Daily    folic acid (FOLVITE) 1 mg, Oral, Daily    hydroxychloroquine (PLAQUENIL) 200 mg, Oral, 2 times daily    methotrexate 15 mg, Intramuscular, Every 7 days    nitroGLYCERIN (NITROSTAT) 0.4 mg, Sublingual, Every 5 min PRN    rosuvastatin (CRESTOR) 40 mg, Oral, Nightly    sertraline (ZOLOFT) 50 mg, Oral    traMADoL (ULTRAM) 50 mg, Oral, Every 24 hours as needed         ENT ROS negative except as stated above.     Patient answers are not available for this  visit.            Objective:      There were no vitals filed for this visit.    General: NAD, well appearing  Eyes: Normal conjunctiva and lids  Face: symmetric, nerve intact  Nose: The nose is without any evidence of any deformity. The nasal mucosa is moist. The septum is midline. There is no evidence of septal hematoma. The turbinates are without abnormality.   Ears: The ears are with normal-appearing pinna. Examination of the canals is normal appearing bilaterally. Serous effusion on right, normal on left. Valsalva negative. Mclean to right. Hearing is grossly intact.  Mouth: No obvious abnormalities to the lips. The teeth are unremarkable. The gingivae are without any obvious evidence of infection or lesion. The oral mucosa is moist and pink. There are no obvious masses to the hard or soft palate.   Oropharynx: The uvula is midline.  The tongue is midline. The posterior pharynx is without erythema or exudate. The tonsils are normal appearing.  Salivary glands: The salivary glands are symmetric and not enlarged, no masses  Neck: No lymphadenopathy, trachea midline, thryoid not enlarged.  Psych: Normal mood and affect.   Neuro: Grossly intact  Speech: fluent    Test Review: I personally reviewed audio with BL baseline severe SNHL, with mixed to profound on right with Type B tymp         Ruma Nguyen   [id number],   : 1970      Procedure date:2023  Patient's medications, allergies, past medical, surgical, social and family histories were reviewed and updated as appropriate.    Pre-procedure diagnosis: Chronic otitis media of right ear with effusion [H65.491]     Procedure: right  myringotomy and aspiration     Procedure in detail: Risks, benefits, and alternatives of the procedure were discussed with the patient, and consent was obtained to perform for the right ear.  The procedure required a high level of expertise and use of an operating microscope and multiple micro-instruments.     With the  patient in the supine position, the operating microscope was used to examine the ear(s)  A variety of sterile, micro-instruments were utilized to remove the cerumen atraumatically. I applied a small amount of phenol topically (local analgesic) to the tympanic membrane.  The tympanic membrane was incised radially in the appropriate location.  I suctioned the middle ear through the incision. This completed the procedure.  I performed the procedure. The patient tolerated the procedure well and there were no complications. S he has pain with suctioned and some effusion was left in place. She could not tell improvement after procedure.     Findings:   Right ear : serous effusion    Left ear  : no effusion    Assessment and Plan:       1. Chronic otitis media of right ear with effusion    2. Mixed conductive and sensorineural hearing loss of right ear with restricted hearing of left ear            Myringotomy on right today, effusion suctioned partially.   Pop ears TID  Sleep on right    Use flonase twice a day until follow up    Would consider hearing aids. She is cleared for this based on nerve lines on audiogram when fluid clears. Reviewed her audio with her, and that right ear should go back to baseline once fluid clears.     RTC: 4 weeks with audiogram after. If repeat procedure needed (has thyroid surgery 12/28, I am at [facility], could do ear tube at same time if agreeable)    Plan of care was discussed in detail with the patient, who agreed with the plan as above. All questions were answered in detail.     Tiffanie Olson MD  Otolaryngology

## 2023-12-15 ENCOUNTER — CLINICAL SUPPORT (OUTPATIENT)
Dept: AUDIOLOGY | Facility: CLINIC | Age: 53
End: 2023-12-15
Payer: COMMERCIAL

## 2023-12-15 ENCOUNTER — OFFICE VISIT (OUTPATIENT)
Dept: OTOLARYNGOLOGY | Facility: CLINIC | Age: 53
End: 2023-12-15
Payer: COMMERCIAL

## 2023-12-15 VITALS — BODY MASS INDEX: 24.75 KG/M2 | HEIGHT: 65 IN | WEIGHT: 148.56 LBS

## 2023-12-15 DIAGNOSIS — H90.3 SENSORINEURAL HEARING LOSS (SNHL) OF BOTH EARS: Primary | ICD-10-CM

## 2023-12-15 DIAGNOSIS — Z71.89 HEARING AID CONSULTATION: Primary | ICD-10-CM

## 2023-12-15 DIAGNOSIS — H93.13 TINNITUS OF BOTH EARS: ICD-10-CM

## 2023-12-15 DIAGNOSIS — H69.91 ETD (EUSTACHIAN TUBE DYSFUNCTION), RIGHT: ICD-10-CM

## 2023-12-15 PROCEDURE — 92567 TYMPANOMETRY: CPT | Mod: S$GLB,,, | Performed by: AUDIOLOGIST

## 2023-12-15 PROCEDURE — 99999 PR PBB SHADOW E&M-EST. PATIENT-LVL I: ICD-10-PCS | Mod: PBBFAC,,, | Performed by: AUDIOLOGIST

## 2023-12-15 PROCEDURE — 99999 PR PBB SHADOW E&M-EST. PATIENT-LVL III: CPT | Mod: PBBFAC,,, | Performed by: STUDENT IN AN ORGANIZED HEALTH CARE EDUCATION/TRAINING PROGRAM

## 2023-12-15 PROCEDURE — 92557 PR COMPREHENSIVE HEARING TEST: ICD-10-PCS | Mod: S$GLB,,, | Performed by: AUDIOLOGIST

## 2023-12-15 PROCEDURE — 99214 OFFICE O/P EST MOD 30 MIN: CPT | Mod: S$GLB,,, | Performed by: STUDENT IN AN ORGANIZED HEALTH CARE EDUCATION/TRAINING PROGRAM

## 2023-12-15 PROCEDURE — 3008F PR BODY MASS INDEX (BMI) DOCUMENTED: ICD-10-PCS | Mod: CPTII,S$GLB,, | Performed by: STUDENT IN AN ORGANIZED HEALTH CARE EDUCATION/TRAINING PROGRAM

## 2023-12-15 PROCEDURE — 3008F BODY MASS INDEX DOCD: CPT | Mod: CPTII,S$GLB,, | Performed by: STUDENT IN AN ORGANIZED HEALTH CARE EDUCATION/TRAINING PROGRAM

## 2023-12-15 PROCEDURE — 99999 PR PBB SHADOW E&M-EST. PATIENT-LVL III: ICD-10-PCS | Mod: PBBFAC,,, | Performed by: STUDENT IN AN ORGANIZED HEALTH CARE EDUCATION/TRAINING PROGRAM

## 2023-12-15 PROCEDURE — 99214 PR OFFICE/OUTPT VISIT, EST, LEVL IV, 30-39 MIN: ICD-10-PCS | Mod: S$GLB,,, | Performed by: STUDENT IN AN ORGANIZED HEALTH CARE EDUCATION/TRAINING PROGRAM

## 2023-12-15 PROCEDURE — 99499 UNLISTED E&M SERVICE: CPT | Mod: PBBFAC,,, | Performed by: AUDIOLOGIST

## 2023-12-15 PROCEDURE — 1159F PR MEDICATION LIST DOCUMENTED IN MEDICAL RECORD: ICD-10-PCS | Mod: CPTII,S$GLB,, | Performed by: STUDENT IN AN ORGANIZED HEALTH CARE EDUCATION/TRAINING PROGRAM

## 2023-12-15 PROCEDURE — 92557 COMPREHENSIVE HEARING TEST: CPT | Mod: S$GLB,,, | Performed by: AUDIOLOGIST

## 2023-12-15 PROCEDURE — 3044F PR MOST RECENT HEMOGLOBIN A1C LEVEL <7.0%: ICD-10-PCS | Mod: CPTII,S$GLB,, | Performed by: STUDENT IN AN ORGANIZED HEALTH CARE EDUCATION/TRAINING PROGRAM

## 2023-12-15 PROCEDURE — 1159F MED LIST DOCD IN RCRD: CPT | Mod: CPTII,S$GLB,, | Performed by: STUDENT IN AN ORGANIZED HEALTH CARE EDUCATION/TRAINING PROGRAM

## 2023-12-15 PROCEDURE — 92567 PR TYMPA2METRY: ICD-10-PCS | Mod: S$GLB,,, | Performed by: AUDIOLOGIST

## 2023-12-15 PROCEDURE — 99499 NO LOS: ICD-10-PCS | Mod: S$GLB,,, | Performed by: AUDIOLOGIST

## 2023-12-15 PROCEDURE — 99999 PR PBB SHADOW E&M-EST. PATIENT-LVL I: CPT | Mod: PBBFAC,,, | Performed by: AUDIOLOGIST

## 2023-12-15 PROCEDURE — 3044F HG A1C LEVEL LT 7.0%: CPT | Mod: CPTII,S$GLB,, | Performed by: STUDENT IN AN ORGANIZED HEALTH CARE EDUCATION/TRAINING PROGRAM

## 2023-12-15 RX ORDER — METHYLPREDNISOLONE 4 MG/1
TABLET ORAL
COMMUNITY
Start: 2023-12-13 | End: 2023-12-22 | Stop reason: ALTCHOICE

## 2023-12-15 RX ORDER — IPRATROPIUM BROMIDE AND ALBUTEROL SULFATE 2.5; .5 MG/3ML; MG/3ML
3 SOLUTION RESPIRATORY (INHALATION)
COMMUNITY
Start: 2023-12-13

## 2023-12-15 NOTE — PROGRESS NOTES
The patient was scheduled for a hearing aid consult for today. Results from the audiological evaluation were reviewed with the patient. The patient reported that she has an insurance hearing aid benefit, but that she has to get the hearing aids through [employer] in order to use her benefit. The patient was provided the test results from today's audiological evaluation. She reported she will contact [employer] regarding a hearing aid fitting. The patient will contact us as needed.

## 2023-12-15 NOTE — PROGRESS NOTES
The patient was referred by Dr. Tiffanie Olson for a hearing evaluation.    Report from the patient was as follows:    Difficulty Hearing/Understanding - hearing loss since childhood, has never worn hearing aids  Tinnitus - AU  History of Loud Noise Exposure -  negative  Family History of Hearing Loss - sister with hearing loss, possibly since childhood    Otoscopic screening revealed a clear view of TM AU    A hearing evaluation was performed today. Test results indicated a mild to moderately severe sensorineural hearing loss in the left ear and moderate to moderately severe sensorineural hearing loss in the right ear. Decrease in speech discrimination noted in the left ear when today's results were compared with those from 10/20/2023. Impedance testing showed a Type A tympanogram in each ear, consistent with normal middle ear function.    The recommendations were as follows:    (1)  Medical evaluation due to decrease in speech discrimination score for the left ear  (2)  Hearing aid consult pending medical clearance, The patient reported that she has an insurance hearing aid benefit, but that she has to get the hearing aids through [employer] in order to use her benefit.  (3)  Ear protection in loud noise   (4)  Hearing evaluation in one year or sooner if hearing decrease is noted       Today's test results and recommendations were discussed with the patient.

## 2023-12-15 NOTE — PROGRESS NOTES
Otolaryngology Clinic Note    Subjective:       Patient ID: Ruma Nguyen is a 52 y.o. female.    Chief Complaint: Hearing Loss      History of Present Illness: Ruma Nguyen is a 52 y.o. female presenting with right sided hearing loss after flight to [place] . Has baseline hearing loss, thinks started about 11 years old, reads lips. No hearing aids. Did try over the counter. No pain in right ear. Ear feels full on right. She has been popping the right ear. Was getting blood from her nose at first. No other sinuses issues. Is on plavix for recent stent. No issues like this before. Denies ringing. No infections or drainage.   She smokes 1/2 ppd. Working on that.   Got abx- doxy from PCP 10/10/23. No steroids.   Has arthritis on imuran and methotrexate.     23: RTC, hearing not better on right, feels like echoing, full. Have been off plavix and aspirin for 3 days. Myringotomy on right today, effusion suctioned partially.     12/15/23: RTC. Having popping when swallowing on right. She can pop her ears. Has been sick with URI. Hearing is still poor, worse on right. Cannot hear over hum in ears. Feels like head cold, moving lower, present for 1.5 weeks. Drainage is clearing up, was yellow, now clear. Right feels worse than left. Sister had hearing loss, reports a bone issue that was operated on. Was given steroid pack. Has not taken. From [employer].     Past Surgical History:   Procedure Laterality Date    ADENOIDECTOMY      ANGIOGRAM, CORONARY, WITH LEFT HEART CATHETERIZATION  2023    Procedure: Left Heart Cath;  Surgeon: Benjamin Bernal MD;  Location: [facility] CATH;  Service: Cardiology;;    BREAST SURGERY  2006    Augmentation     SECTION      COLONOSCOPY N/A 2020    Procedure: COLONOSCOPY;  Surgeon: Lalo De Leon MD;  Location: [facility] ENDO;  Service: Endoscopy;  Laterality: N/A;    CORONARY ANGIOGRAPHY  2023    Procedure: -;  Surgeon: Benjamin Bernal MD;  Location: [facility]  CATH;  Service: Cardiology;;    HERNIA REPAIR      x 3    PERCUTANEOUS CORONARY INTERVENTION, ARTERY  7/13/2023    Procedure: RED LCX;  Surgeon: Benjamin Bernal MD;  Location: [facility] CATH;  Service: Cardiology;;    POLYPECTOMY      STENT, DRUG ELUTING, SINGLE VESSEL, CORONARY  7/13/2023    Procedure: -;  Surgeon: Benjamin Bernal MD;  Location: [facility] CATH;  Service: Cardiology;;    TUBAL LIGATION Bilateral 1995     Past Medical History:   Diagnosis Date    Anticoagulant long-term use     Antiphospholipid syndrome     Cancer     skin, melanoma    Colon polyps     Heart murmur     Hyperlipidemia     Hypertension     PVD (peripheral vascular disease) with claudication 07/2023    RA (rheumatoid arthritis)     Radiculopathy     lumbar    Rheumatoid arthritis of multiple sites without rheumatoid factor     Thyroid nodule 07/2023     Social Determinants of Health     Tobacco Use: High Risk (12/15/2023)    Patient History     Smoking Tobacco Use: Every Day     Smokeless Tobacco Use: Never     Passive Exposure: Not on file   Alcohol Use: Patient Declined (12/15/2023)    AUDIT-C     Frequency of Alcohol Consumption: Patient declined     Average Number of Drinks: Patient declined     Frequency of Binge Drinking: Patient declined   Financial Resource Strain: Patient Declined (12/15/2023)    Overall Financial Resource Strain (CARDIA)     Difficulty of Paying Living Expenses: Patient declined   Food Insecurity: Patient Declined (12/15/2023)    Hunger Vital Sign     Worried About Running Out of Food in the Last Year: Patient declined     Ran Out of Food in the Last Year: Patient declined   Transportation Needs: Patient Declined (12/15/2023)    PRAPARE - Transportation     Lack of Transportation (Medical): Patient declined     Lack of Transportation (Non-Medical): Patient declined   Physical Activity: Unknown (12/15/2023)    Exercise Vital Sign     Days of Exercise per Week: Patient declined     Minutes of Exercise per Session: Not on  file   Stress: Patient Declined (12/15/2023)    [employer] [place] [employer] - Occupational Stress Questionnaire     Feeling of Stress : Patient declined   Social Connections: Unknown (12/15/2023)    Social Connection and Isolation Panel [NHANES]     Frequency of Communication with Friends and Family: Patient declined     Frequency of Social Gatherings with Friends and Family: Patient declined     Attends Voodoo Services: Not on file     Active Member of Clubs or Organizations: Patient declined     Attends Club or Organization Meetings: Patient declined     Marital Status:    Housing Stability: Patient Declined (12/15/2023)    Housing Stability Vital Sign     Unable to Pay for Housing in the Last Year: Patient declined     Number of Places Lived in the Last Year: Not on file     Unstable Housing in the Last Year: Patient declined   Depression: Medium Risk (1/3/2023)    Depression     Last PHQ-4: Flowsheet Data: 5     Review of patient's allergies indicates:  No Known Allergies  Current Outpatient Medications   Medication Instructions    albuterol (PROVENTIL) 2.5 mg /3 mL (0.083 %) nebulizer solution Inhale 3 mL 3 times a day by nebulization route for 30 days.    albuterol-ipratropium (DUO-NEB) 2.5 mg-0.5 mg/3 mL nebulizer solution Nebulization    amLODIPine (NORVASC) 5 MG tablet TAKE 1 TABLET BY MOUTH EVERY DAY IN THE EVENING    amoxicillin (AMOXIL) 500 MG Tab SMARTSI Tablet(s) By Mouth    aspirin (ECOTRIN) 324 mg, Oral, Nightly    azaTHIOprine (IMURAN) 50 mg Tab 1 tablet, Oral, Daily    clonazePAM (KLONOPIN) 1 mg, Oral, Daily PRN    clopidogreL (PLAVIX) 75 mg, Oral, Daily    fluticasone propionate (FLONASE) 50 mcg, Each Nostril, 2 times daily    folic acid (FOLVITE) 1 mg, Oral, Daily    hydroxychloroquine (PLAQUENIL) 200 mg, Oral, 2 times daily    methotrexate 15 mg, Intramuscular, Every 7 days    methylPREDNISolone (MEDROL DOSEPACK) 4 mg tablet Oral    nitroGLYCERIN (NITROSTAT) 0.4 mg,  Sublingual, Every 5 min PRN    rosuvastatin (CRESTOR) 40 mg, Oral, Nightly    sertraline (ZOLOFT) 50 mg, Oral    traMADoL (ULTRAM) 50 mg, Oral, Every 24 hours as needed         ENT ROS negative except as stated above.     Patient answers are not available for this visit.            Objective:      There were no vitals filed for this visit.    General: NAD, well appearing  Eyes: Normal conjunctiva and lids  Face: symmetric, nerve intact  Nose: The nose is without any evidence of any deformity. The nasal mucosa is moist. The septum is midline. There is no evidence of septal hematoma. The turbinates are without abnormality.   Ears: The ears are with normal-appearing pinna. Examination of the canals is normal appearing bilaterally. No fluid on right today. Valsalva positive. Mclean midline. Hearing is grossly intact.  Mouth: No obvious abnormalities to the lips. The teeth are unremarkable. The gingivae are without any obvious evidence of infection or lesion. The oral mucosa is moist and pink. There are no obvious masses to the hard or soft palate.   Oropharynx: The uvula is midline.  The tongue is midline. The posterior pharynx is without erythema or exudate. The tonsils are normal appearing.  Salivary glands: The salivary glands are symmetric and not enlarged, no masses  Neck: No lymphadenopathy, trachea midline, thryoid not enlarged.  Psych: Normal mood and affect.   Neuro: Grossly intact  Speech: fluent    Test Review: I personally reviewed audio with BL baseline severe SNHL, with mixed to profound on right with Type B tymp        Assessment and Plan:       1. Sensorineural hearing loss (SNHL) of both ears    2. ETD (Eustachian tube dysfunction), right              Pop ears TID  Use flonase twice a day until better. Does not want to add allergy pill.     She has steroid course for URI from urgent care. Advised to take a few days and can help with ear.     Would consider hearing aids. She is cleared for this. ABG as  closed today. Reviewed that the longer she goes without hearing aids, the worse her neural connections will be. Consider hearing aids sooner rather than later.     RTC: She will contact if ear does not feel better. Has thyroidectomy coming up    Plan of care was discussed in detail with the patient, who agreed with the plan as above. All questions were answered in detail.     Tiffanie Olson MD  Otolaryngology

## 2024-01-27 DIAGNOSIS — D68.61 ANTIPHOSPHOLIPID SYNDROME: ICD-10-CM

## 2024-01-27 DIAGNOSIS — M06.09 RHEUMATOID ARTHRITIS OF MULTIPLE SITES WITH NEGATIVE RHEUMATOID FACTOR: ICD-10-CM

## 2024-01-28 RX ORDER — FOLIC ACID 1 MG/1
1 TABLET ORAL NIGHTLY
Qty: 90 TABLET | Refills: 3 | Status: SHIPPED | OUTPATIENT
Start: 2024-01-28 | End: 2024-03-13

## 2024-01-29 RX ORDER — CLOPIDOGREL BISULFATE 75 MG/1
75 TABLET ORAL
Qty: 90 TABLET | Refills: 1 | Status: SHIPPED | OUTPATIENT
Start: 2024-01-29

## 2024-02-09 ENCOUNTER — NURSE TRIAGE (OUTPATIENT)
Dept: ADMINISTRATIVE | Facility: CLINIC | Age: 54
End: 2024-02-09
Payer: COMMERCIAL

## 2024-02-09 NOTE — TELEPHONE ENCOUNTER
Pt calling and c/o left shoulder pain and tingling down her arm pt has called several times and told to go to the ED. Pt said that the pain has been there for about a month but getting worse and thought that she was having a blood clot in the shoulder. Pt triaged and care advice to go to the ED and she is in route there now or was going to go to the MD's office. Pt told and emphasized that the ED is where she needs to go and to call back if any worsening symptoms or needs                      Reason for Disposition   Age > 40 and no obvious cause and pain even when not moving the arm  (Exception: Pain is clearly made worse by moving arm or bending neck.)    Additional Information   Negative: Shock suspected (e.g., cold/pale/clammy skin, too weak to stand, low BP, rapid pulse)   Negative: Similar pain previously and it was from 'heart attack'   Negative: Similar pain previously and it was from 'angina' and not relieved by nitroglycerin   Negative: Sounds like a life-threatening emergency to the triager   Negative: Difficulty breathing or unusual sweating (e.g., sweating without exertion)   Negative: Pain lasting > 5 minutes and pain also present in chest  (Exception: Pain is clearly made worse by movement.)    Protocols used: Shoulder Pain-A-[place]

## 2024-03-11 ENCOUNTER — PATIENT MESSAGE (OUTPATIENT)
Dept: RHEUMATOLOGY | Facility: CLINIC | Age: 54
End: 2024-03-11
Payer: COMMERCIAL

## 2024-03-13 ENCOUNTER — LAB VISIT (OUTPATIENT)
Dept: LAB | Facility: HOSPITAL | Age: 54
End: 2024-03-13
Attending: INTERNAL MEDICINE
Payer: COMMERCIAL

## 2024-03-13 ENCOUNTER — OFFICE VISIT (OUTPATIENT)
Dept: RHEUMATOLOGY | Facility: CLINIC | Age: 54
End: 2024-03-13
Payer: COMMERCIAL

## 2024-03-13 VITALS
HEART RATE: 69 BPM | SYSTOLIC BLOOD PRESSURE: 156 MMHG | DIASTOLIC BLOOD PRESSURE: 84 MMHG | HEIGHT: 65 IN | WEIGHT: 150.81 LBS | BODY MASS INDEX: 25.13 KG/M2

## 2024-03-13 DIAGNOSIS — D68.61 ANTIPHOSPHOLIPID SYNDROME: ICD-10-CM

## 2024-03-13 DIAGNOSIS — I73.9 PERIPHERAL VASCULAR DISEASE, UNSPECIFIED: ICD-10-CM

## 2024-03-13 DIAGNOSIS — F17.229 CHEWING TOBACCO NICOTINE DEPENDENCE WITH NICOTINE-INDUCED DISORDER: ICD-10-CM

## 2024-03-13 DIAGNOSIS — Z51.81 ENCOUNTER FOR MEDICATION MONITORING: ICD-10-CM

## 2024-03-13 DIAGNOSIS — M06.09 RHEUMATOID ARTHRITIS OF MULTIPLE SITES WITH NEGATIVE RHEUMATOID FACTOR: Primary | ICD-10-CM

## 2024-03-13 DIAGNOSIS — M79.7 FIBROMYALGIA: ICD-10-CM

## 2024-03-13 DIAGNOSIS — E53.8 BIOTIN-(PROPIONYL-COA-CARBOXYLASE) LIGASE DEFICIENCY: ICD-10-CM

## 2024-03-13 DIAGNOSIS — M54.12 CERVICAL RADICULOPATHY AT C6: ICD-10-CM

## 2024-03-13 DIAGNOSIS — D84.821 DRUG-INDUCED IMMUNODEFICIENCY: ICD-10-CM

## 2024-03-13 DIAGNOSIS — I25.10 CORONARY ATHEROSCLEROSIS OF NATIVE CORONARY ARTERY: ICD-10-CM

## 2024-03-13 DIAGNOSIS — Z79.899 DRUG-INDUCED IMMUNODEFICIENCY: ICD-10-CM

## 2024-03-13 DIAGNOSIS — M06.09 RHEUMATOID ARTHRITIS OF MULTIPLE SITES WITH NEGATIVE RHEUMATOID FACTOR: ICD-10-CM

## 2024-03-13 DIAGNOSIS — C73 MALIGNANT NEOPLASM OF THYROID GLAND: Primary | ICD-10-CM

## 2024-03-13 DIAGNOSIS — E55.9 AVITAMINOSIS D: ICD-10-CM

## 2024-03-13 LAB
25(OH)D3+25(OH)D2 SERPL-MCNC: 12 NG/ML (ref 30–96)
ALBUMIN SERPL BCP-MCNC: 4 G/DL (ref 3.5–5.2)
ALP SERPL-CCNC: 79 U/L (ref 55–135)
ALT SERPL W/O P-5'-P-CCNC: 23 U/L (ref 10–44)
ANION GAP SERPL CALC-SCNC: 7 MMOL/L (ref 8–16)
ANION GAP SERPL CALC-SCNC: 7 MMOL/L (ref 8–16)
AST SERPL-CCNC: 24 U/L (ref 10–40)
BASOPHILS # BLD AUTO: 0.06 K/UL (ref 0–0.2)
BASOPHILS NFR BLD: 1.3 % (ref 0–1.9)
BILIRUB SERPL-MCNC: 0.4 MG/DL (ref 0.1–1)
BUN SERPL-MCNC: 6 MG/DL (ref 6–20)
BUN SERPL-MCNC: 6 MG/DL (ref 6–20)
CALCIUM SERPL-MCNC: 9.3 MG/DL (ref 8.7–10.5)
CALCIUM SERPL-MCNC: 9.8 MG/DL (ref 8.7–10.5)
CHLORIDE SERPL-SCNC: 109 MMOL/L (ref 95–110)
CHLORIDE SERPL-SCNC: 110 MMOL/L (ref 95–110)
CO2 SERPL-SCNC: 25 MMOL/L (ref 23–29)
CO2 SERPL-SCNC: 27 MMOL/L (ref 23–29)
CREAT SERPL-MCNC: 0.8 MG/DL (ref 0.5–1.4)
CREAT SERPL-MCNC: 0.8 MG/DL (ref 0.5–1.4)
CRP SERPL-MCNC: 1.2 MG/L (ref 0–8.2)
DIFFERENTIAL METHOD BLD: ABNORMAL
EOSINOPHIL # BLD AUTO: 0.1 K/UL (ref 0–0.5)
EOSINOPHIL NFR BLD: 2.9 % (ref 0–8)
ERYTHROCYTE [DISTWIDTH] IN BLOOD BY AUTOMATED COUNT: 13.6 % (ref 11.5–14.5)
ERYTHROCYTE [SEDIMENTATION RATE] IN BLOOD BY PHOTOMETRIC METHOD: 29 MM/HR (ref 0–36)
EST. GFR  (NO RACE VARIABLE): >60 ML/MIN/1.73 M^2
EST. GFR  (NO RACE VARIABLE): >60 ML/MIN/1.73 M^2
GLUCOSE SERPL-MCNC: 102 MG/DL (ref 70–110)
GLUCOSE SERPL-MCNC: 97 MG/DL (ref 70–110)
HCT VFR BLD AUTO: 41.4 % (ref 37–48.5)
HGB BLD-MCNC: 14.6 G/DL (ref 12–16)
IMM GRANULOCYTES # BLD AUTO: 0 K/UL (ref 0–0.04)
IMM GRANULOCYTES NFR BLD AUTO: 0 % (ref 0–0.5)
LYMPHOCYTES # BLD AUTO: 1.6 K/UL (ref 1–4.8)
LYMPHOCYTES NFR BLD: 34 % (ref 18–48)
MCH RBC QN AUTO: 34.4 PG (ref 27–31)
MCHC RBC AUTO-ENTMCNC: 35.3 G/DL (ref 32–36)
MCV RBC AUTO: 97 FL (ref 82–98)
MONOCYTES # BLD AUTO: 0.2 K/UL (ref 0.3–1)
MONOCYTES NFR BLD: 4.8 % (ref 4–15)
NEUTROPHILS # BLD AUTO: 2.7 K/UL (ref 1.8–7.7)
NEUTROPHILS NFR BLD: 57 % (ref 38–73)
NRBC BLD-RTO: 0 /100 WBC
PLATELET # BLD AUTO: 255 K/UL (ref 150–450)
PMV BLD AUTO: 9.5 FL (ref 9.2–12.9)
POTASSIUM SERPL-SCNC: 3.6 MMOL/L (ref 3.5–5.1)
POTASSIUM SERPL-SCNC: 3.8 MMOL/L (ref 3.5–5.1)
PROT SERPL-MCNC: 6.9 G/DL (ref 6–8.4)
RBC # BLD AUTO: 4.25 M/UL (ref 4–5.4)
SODIUM SERPL-SCNC: 142 MMOL/L (ref 136–145)
SODIUM SERPL-SCNC: 143 MMOL/L (ref 136–145)
T3FREE SERPL-MCNC: 3.1 PG/ML (ref 2.3–4.2)
T4 FREE SERPL-MCNC: 0.69 NG/DL (ref 0.71–1.51)
THYROPEROXIDASE IGG SERPL-ACNC: <6 IU/ML
TSH SERPL DL<=0.005 MIU/L-ACNC: 3.48 UIU/ML (ref 0.4–4)
WBC # BLD AUTO: 4.76 K/UL (ref 3.9–12.7)

## 2024-03-13 PROCEDURE — 1160F RVW MEDS BY RX/DR IN RCRD: CPT | Mod: CPTII,S$GLB,, | Performed by: INTERNAL MEDICINE

## 2024-03-13 PROCEDURE — 99215 OFFICE O/P EST HI 40 MIN: CPT | Mod: S$GLB,,, | Performed by: INTERNAL MEDICINE

## 2024-03-13 PROCEDURE — 84481 FREE ASSAY (FT-3): CPT | Performed by: INTERNAL MEDICINE

## 2024-03-13 PROCEDURE — 82306 VITAMIN D 25 HYDROXY: CPT | Performed by: INTERNAL MEDICINE

## 2024-03-13 PROCEDURE — 84443 ASSAY THYROID STIM HORMONE: CPT | Performed by: INTERNAL MEDICINE

## 2024-03-13 PROCEDURE — 82784 ASSAY IGA/IGD/IGG/IGM EACH: CPT | Performed by: INTERNAL MEDICINE

## 2024-03-13 PROCEDURE — 80053 COMPREHEN METABOLIC PANEL: CPT | Performed by: INTERNAL MEDICINE

## 2024-03-13 PROCEDURE — 84443 ASSAY THYROID STIM HORMONE: CPT | Mod: 91 | Performed by: INTERNAL MEDICINE

## 2024-03-13 PROCEDURE — 85613 RUSSELL VIPER VENOM DILUTED: CPT | Performed by: INTERNAL MEDICINE

## 2024-03-13 PROCEDURE — 85730 THROMBOPLASTIN TIME PARTIAL: CPT | Performed by: INTERNAL MEDICINE

## 2024-03-13 PROCEDURE — 84439 ASSAY OF FREE THYROXINE: CPT | Performed by: INTERNAL MEDICINE

## 2024-03-13 PROCEDURE — 36415 COLL VENOUS BLD VENIPUNCTURE: CPT | Performed by: INTERNAL MEDICINE

## 2024-03-13 PROCEDURE — 86376 MICROSOMAL ANTIBODY EACH: CPT | Performed by: INTERNAL MEDICINE

## 2024-03-13 PROCEDURE — 86146 BETA-2 GLYCOPROTEIN ANTIBODY: CPT | Mod: 59 | Performed by: INTERNAL MEDICINE

## 2024-03-13 PROCEDURE — 86140 C-REACTIVE PROTEIN: CPT | Performed by: INTERNAL MEDICINE

## 2024-03-13 PROCEDURE — 85652 RBC SED RATE AUTOMATED: CPT | Performed by: INTERNAL MEDICINE

## 2024-03-13 PROCEDURE — 84445 ASSAY OF TSI GLOBULIN: CPT | Performed by: INTERNAL MEDICINE

## 2024-03-13 PROCEDURE — 36415 COLL VENOUS BLD VENIPUNCTURE: CPT | Mod: PO,XB | Performed by: INTERNAL MEDICINE

## 2024-03-13 PROCEDURE — 82787 IGG 1 2 3 OR 4 EACH: CPT | Performed by: INTERNAL MEDICINE

## 2024-03-13 PROCEDURE — 3008F BODY MASS INDEX DOCD: CPT | Mod: CPTII,S$GLB,, | Performed by: INTERNAL MEDICINE

## 2024-03-13 PROCEDURE — 99999 PR PBB SHADOW E&M-EST. PATIENT-LVL IV: CPT | Mod: PBBFAC,,, | Performed by: INTERNAL MEDICINE

## 2024-03-13 PROCEDURE — 80048 BASIC METABOLIC PNL TOTAL CA: CPT | Mod: XB | Performed by: INTERNAL MEDICINE

## 2024-03-13 PROCEDURE — 86147 CARDIOLIPIN ANTIBODY EA IG: CPT | Mod: 59 | Performed by: INTERNAL MEDICINE

## 2024-03-13 PROCEDURE — 3079F DIAST BP 80-89 MM HG: CPT | Mod: CPTII,S$GLB,, | Performed by: INTERNAL MEDICINE

## 2024-03-13 PROCEDURE — 1159F MED LIST DOCD IN RCRD: CPT | Mod: CPTII,S$GLB,, | Performed by: INTERNAL MEDICINE

## 2024-03-13 PROCEDURE — 85025 COMPLETE CBC W/AUTO DIFF WBC: CPT | Performed by: INTERNAL MEDICINE

## 2024-03-13 PROCEDURE — 3077F SYST BP >= 140 MM HG: CPT | Mod: CPTII,S$GLB,, | Performed by: INTERNAL MEDICINE

## 2024-03-13 RX ORDER — METHOTREXATE 25 MG/ML
15 INJECTION, SOLUTION INTRA-ARTERIAL; INTRAMUSCULAR; INTRAVENOUS
Qty: 2 ML | Refills: 3 | Status: SHIPPED | OUTPATIENT
Start: 2024-03-13 | End: 2024-06-17

## 2024-03-13 RX ORDER — PREGABALIN 75 MG/1
75 CAPSULE ORAL 2 TIMES DAILY
Qty: 60 CAPSULE | Refills: 3 | Status: SHIPPED | OUTPATIENT
Start: 2024-03-13

## 2024-03-13 RX ORDER — FOLIC ACID 1 MG/1
1 TABLET ORAL NIGHTLY
Qty: 90 TABLET | Refills: 3 | Status: SHIPPED | OUTPATIENT
Start: 2024-03-13

## 2024-03-13 NOTE — PROGRESS NOTES
RHEUMATOLOGY CLINIC  VISIT    Chief complaints, HPI, ROS, EXAM, Assessment & Plans:-  Ruma Sanchez a 53 y.o. pleasant female comes in with longstanding history of seronegative rheumatoid arthritis and antiphospholipid antibody syndrome treated by external rheumatologist with Plaquenil and methotrexate.   Longstanding fibromyalgia treated with gabapentin.  Comes in today with severe worsening left lateral neck pain radiating to her fingers including the shoulder and axillary region.  BRYAN showed significant stenosis of right leg.   No weakness.  No bladder or bowel incontinence.  History of amaurosis fugax.  Severe cardiovascular disease with calcification.  Aortic insufficiency managed by cardiologist.  No photosensitive rash.  No sicca syndrome.  No Raynaud's phenomenon.  No history of purpura.  Rheumatological review of system negative otherwise.  Physical examination shows mild synovitis of bilateral hand PIP joints.  Severe diffuse hyperesthesia confound estimation of joint tenderness. No sclerodactyly or telangiectasia.  1. Rheumatoid arthritis of multiple sites with negative rheumatoid factor    2. Antiphospholipid syndrome    3. Cervical radiculopathy at C6    4. Drug-induced immunodeficiency    5. Encounter for medication monitoring      Problem List Items Addressed This Visit       Antiphospholipid syndrome    Relevant Medications    folic acid (FOLVITE) 1 MG tablet    methotrexate 25 mg/mL injection    Other Relevant Orders    CBC Auto Differential    Comprehensive Metabolic Panel    C-Reactive Protein    Sedimentation rate    Cardiolipin antibody    Beta-2 Glycoprotein Abs (IgA, IgG, IgM)    DRVVT    TSH    Rheumatoid arthritis of multiple sites with negative rheumatoid factor - Primary    Relevant Medications    folic acid (FOLVITE) 1 MG tablet    methotrexate 25 mg/mL injection    Other Relevant Orders    CBC Auto Differential    Comprehensive Metabolic Panel    C-Reactive Protein    Sedimentation  rate    Cardiolipin antibody    Beta-2 Glycoprotein Abs (IgA, IgG, IgM)    DRVVT    TSH    IGG    Immunoglobulin G Subclass 4    Cervical radiculopathy at C6    Relevant Medications    pregabalin (LYRICA) 75 MG capsule    Drug-induced immunodeficiency    Encounter for medication monitoring      Latest Reference Range & Units 02/24/23 15:50   WBC 3.90 - 12.70 K/uL 5.81   RBC 4.00 - 5.40 M/uL 4.37   Hemoglobin 12.0 - 16.0 g/dL 15.1   Hematocrit 37.0 - 48.5 % 43.0   MCV 82 - 98 fL 98   MCH 27.0 - 31.0 pg 34.6 (H)   MCHC 32.0 - 36.0 g/dL 35.1   RDW 11.5 - 14.5 % 14.0   Platelets 150 - 450 K/uL 243   MPV 9.2 - 12.9 fL 9.3   Gran % 38.0 - 73.0 % 48.0   Lymph % 18.0 - 48.0 % 43.7   Mono % 4.0 - 15.0 % 3.4 (L)   Eosinophil % 0.0 - 8.0 % 3.3   Basophil % 0.0 - 1.9 % 1.4   Immature Granulocytes 0.0 - 0.5 % 0.2   Gran # (ANC) 1.8 - 7.7 K/uL 2.8   Lymph # 1.0 - 4.8 K/uL 2.5   Mono # 0.3 - 1.0 K/uL 0.2 (L)   Eos # 0.0 - 0.5 K/uL 0.2   Baso # 0.00 - 0.20 K/uL 0.08   Immature Grans (Abs) 0.00 - 0.04 K/uL 0.01   nRBC 0 /100 WBC 0   Differential Method  Automated   Sed Rate 0 - 36 mm/Hr 9   Sodium 136 - 145 mmol/L 142   Potassium 3.5 - 5.1 mmol/L 3.7   Chloride 95 - 110 mmol/L 106   CO2 23 - 29 mmol/L 24   Anion Gap 8 - 16 mmol/L 12   BUN 6 - 20 mg/dL 6   Creatinine 0.5 - 1.4 mg/dL 0.8   eGFR >60 mL/min/1.73 m^2 >60   Glucose 70 - 110 mg/dL 84   Calcium 8.7 - 10.5 mg/dL 9.1   Alkaline Phosphatase 55 - 135 U/L 67   PROTEIN TOTAL 6.0 - 8.4 g/dL 7.3   Albumin 3.5 - 5.2 g/dL 3.9   BILIRUBIN TOTAL 0.1 - 1.0 mg/dL 0.5   AST 10 - 40 U/L 22   ALT 10 - 44 U/L 18   CRP 0.0 - 8.2 mg/L 1.9   HERNAN Screen Negative <1:80  Negative <1:80   Complement (C-3) 50 - 180 mg/dL 94   Complement (C-4) 11 - 44 mg/dL 3 (L)   Cryoglobulin, Qual Absent  Absent   (H): Data is abnormally high  (L): Data is abnormally low      Severe worsening seronegative rheumatoid arthritis with active synovitis.  Self discontinued methotrexate due to concerns of enlarged liver.   If liver functions returned normal, advised to restart methotrexate.  Check labs as ordered today.  Continue Plavix for severe cardiac atherosclerotic disease.  Severe worsening fibromyalgia.  Discontinue gabapentin due to adverse effects and try Lyrica.  History of antiphospholipid antibody syndrome.  Repeat panel today.  Continue Plavix.  Cervical radiculopathy symptoms.  Advised to follow-up with interventional pain specialist.  Drug induced immunodeficiency due to use of immunosuppressive drugs. Monitor carefully for infections. Advised to get immediate medical care if any infection. Also advised strict adherence to age appropriate vaccinations and cancer screenings with PCP.  Hold methotrexate if any infection.  Biologic medications contraindicated due to recently diagnosed papillary thyroid cancer.      # Follow up in about 6 months (around 2024).      Past Medical History:   Diagnosis Date    Anticoagulant long-term use     Antiphospholipid syndrome     Cancer     skin, melanoma    Colon polyps     Coronary artery disease     Heart murmur     Hyperlipidemia     Hypertension     PVD (peripheral vascular disease) with claudication 2023    RA (rheumatoid arthritis)     Radiculopathy     lumbar    Rheumatoid arthritis of multiple sites without rheumatoid factor     Thyroid nodule 2023       Past Surgical History:   Procedure Laterality Date    ADENOIDECTOMY      ANGIOGRAM, CORONARY, WITH LEFT HEART CATHETERIZATION  2023    Procedure: Left Heart Cath;  Surgeon: Benjamin Bernal MD;  Location: [facility] CATH;  Service: Cardiology;;    BREAST SURGERY  2006    Augmentation     SECTION      COLONOSCOPY N/A 2020    Procedure: COLONOSCOPY;  Surgeon: Lalo De Leon MD;  Location: [facility] ENDO;  Service: Endoscopy;  Laterality: N/A;    CORONARY ANGIOGRAPHY  2023    Procedure: -;  Surgeon: Benjamin Bernal MD;  Location: [facility] CATH;  Service: Cardiology;;    HERNIA REPAIR      x 3     PERCUTANEOUS CORONARY INTERVENTION, ARTERY  7/13/2023    Procedure: RED LCX;  Surgeon: Benjamin Bernal MD;  [facility]ATH;  Service: Cardiology;;    POLYPECTOMY      STENT, DRUG ELUTING, SINGLE VESSEL, CORONARY  7/13/2023    Procedure: -;  Surgeon: Benjamin Bernal MD;  [facility]ATH;  Service: Cardiology;;    THYROIDECTOMY Left 12/27/2023    Procedure: THYROIDECTOMY - LEFT LOBE THYROIDECTOMY;  Surgeon: Pravin Mujica MD;  Location: [facility] OR;  Service: General;  Laterality: Left;    TUBAL LIGATION Bilateral 1995        Social History     Tobacco Use    Smoking status: Every Day     Current packs/day: 0.75     Average packs/day: 0.8 packs/day for 38.0 years (28.5 ttl pk-yrs)     Types: Cigarettes    Smokeless tobacco: Never    Tobacco comments:     Needs help to quit; had problems getting chantix   Substance Use Topics    Alcohol use: No    Drug use: No       Family History   Problem Relation Age of Onset    Heart disease Mother     Hypertension Mother     Cancer Father     Colon cancer Father     No Known Problems Sister     Diabetes Brother     Kidney cancer Brother        Review of patient's allergies indicates:  No Known Allergies    Medication List with Changes/Refills   New Medications    PREGABALIN (LYRICA) 75 MG CAPSULE    Take 1 capsule (75 mg total) by mouth 2 (two) times daily.   Current Medications    ALBUTEROL-IPRATROPIUM (DUO-NEB) 2.5 MG-0.5 MG/3 ML NEBULIZER SOLUTION    Take 3 mLs by nebulization as needed.    AMLODIPINE (NORVASC) 5 MG TABLET    TAKE 1 TABLET BY MOUTH EVERY DAY IN THE EVENING    ASPIRIN (ECOTRIN) 81 MG EC TABLET    Take 81 mg by mouth every evening.    CLONAZEPAM (KLONOPIN) 1 MG TABLET    Take 1 tablet (1 mg total) by mouth daily as needed for Anxiety.    CLOPIDOGREL (PLAVIX) 75 MG TABLET    TAKE 1 TABLET BY MOUTH EVERY DAY    FLUTICASONE PROPIONATE (FLONASE) 50 MCG/ACTUATION NASAL SPRAY    1 spray (50 mcg total) by Each Nostril route 2 (two) times a day.    HYDROXYCHLOROQUINE  (PLAQUENIL) 200 MG TABLET    Take 1 tablet (200 mg total) by mouth 2 (two) times daily.    LIDOCAINE (LIDODERM) 5 %    Place 1 patch onto the skin once daily. Remove & Discard patch within 12 hours or as directed by MD    NAPROXEN (NAPROSYN) 500 MG TABLET    Take 1 tablet (500 mg total) by mouth 2 (two) times daily with meals.    NITROGLYCERIN (NITROSTAT) 0.4 MG SL TABLET    Place 1 tablet (0.4 mg total) under the tongue every 5 (five) minutes as needed for Chest pain.    ROSUVASTATIN (CRESTOR) 40 MG TAB    TAKE 1 TABLET BY MOUTH EVERY DAY IN THE EVENING    SERTRALINE (ZOLOFT) 50 MG TABLET    Take 50 mg by mouth every evening.   Changed and/or Refilled Medications    Modified Medication Previous Medication    FOLIC ACID (FOLVITE) 1 MG TABLET folic acid (FOLVITE) 1 MG tablet       Take 1 tablet (1 mg total) by mouth every evening.    Take 1 tablet (1 mg total) by mouth every evening.    METHOTREXATE 25 MG/ML INJECTION methotrexate 25 mg/mL injection       Inject 0.6 mLs (15 mg total) into the muscle every 7 days.    Inject 0.6 mLs (15 mg total) into the muscle every 7 days.   Discontinued Medications    GABAPENTIN (NEURONTIN) 100 MG CAPSULE    Take 1 capsule (100 mg total) by mouth 3 (three) times daily.         Thank you for allowing me to participate in the care ofRuma Nguyen.    Disclaimer: This note was prepared using voice recognition system and is likely to have sound alike errors and is not proof read.  Please call me with any questions.

## 2024-03-14 LAB
IGG SERPL-MCNC: 973 MG/DL (ref 650–1600)
TSH SERPL DL<=0.005 MIU/L-ACNC: 2.77 UIU/ML (ref 0.4–4)

## 2024-03-16 LAB
CONFIRM DRVVT STA-STACLOT: NORMAL S
DRVVT SCREEN TO CONFIRM RATIO: NORMAL {RATIO}
HEPARIN NT PPP QL: NORMAL
LA 3 SCREEN W REFLEX-IMP: NORMAL
LMW HEPARIN IND PLT AB SER QL: NORMAL
MIXING DRVVT/NORMAL: NORMAL %
NEUTRALIZED DRVVT SCREEN RATIO: NORMAL
PROTHROMBIN TIME: 12.5 S (ref 12–15.5)
SCREEN APTT/NORMAL: 1.07
SCREEN APTT/NORMAL: NORMAL
SCREEN DRVVT/NORMAL: 0.88 %
THROMBIN TIME: NORMAL S

## 2024-03-18 LAB
IGG4 SER-MCNC: 29 MG/DL (ref 4–86)
TSI SER-ACNC: <0.1 IU/L

## 2024-03-19 LAB
B2 GLYCOPROT1 IGA SER QL: 16 U/ML
B2 GLYCOPROT1 IGG SER QL: 3.8 U/ML
B2 GLYCOPROT1 IGM SER QL: 51 U/ML
CARDIOLIPIN IGG SER IA-ACNC: <9.4 GPL (ref 0–14.99)
CARDIOLIPIN IGM SER IA-ACNC: 34 MPL (ref 0–12.49)

## 2024-05-13 ENCOUNTER — HOSPITAL ENCOUNTER (OUTPATIENT)
Dept: CARDIOLOGY | Facility: HOSPITAL | Age: 54
Discharge: HOME | End: 2024-05-13
Attending: INTERNAL MEDICINE
Payer: COMMERCIAL

## 2024-05-13 DIAGNOSIS — I73.9 PVD (PERIPHERAL VASCULAR DISEASE) WITH CLAUDICATION: Chronic | ICD-10-CM

## 2024-05-13 PROCEDURE — 93925 LOWER EXTREMITY STUDY: CPT | Mod: 26,,, | Performed by: INTERNAL MEDICINE

## 2024-05-13 PROCEDURE — 93925 LOWER EXTREMITY STUDY: CPT | Mod: PO

## 2024-05-14 LAB
LEFT ANT TIBIAL SYS PSV: 30 CM/S
LEFT CFA PSV: 96 CM/S
LEFT EXTERNAL ILIAC PSV: 61 CM/S
LEFT PERONEAL SYS PSV: 8 CM/S
LEFT POPLITEAL PSV: 32 CM/S
LEFT POST TIBIAL SYS PSV: 28 CM/S
LEFT PROFUNDA SYS PSV: 63 CM/S
LEFT SUPER FEMORAL DIST SYS PSV: 33 CM/S
LEFT SUPER FEMORAL MID SYS PSV: 54 CM/S
LEFT SUPER FEMORAL OSTIAL SYS PSV: 96 CM/S
LEFT SUPER FEMORAL PROX SYS PSV: 80 CM/S
LEFT TIB/PER TRUNK SYS PSV: 33 CM/S
OHS CV LEFT LOWER EXTREMITY ABI (NO CALC): 0.75
OHS CV RIGHT ABI LOWER EXTREMITY (NO CALC): 0.92
RIGHT ANT TIBIAL SYS PSV: 37 CM/S
RIGHT CFA PSV: 213 CM/S
RIGHT EXTERNAL ILLIAC PSV: 155 CM/S
RIGHT PERONEAL SYS PSV: 32 CM/S
RIGHT POPLITEAL PSV: 49 CM/S
RIGHT POST TIBIAL SYS PSV: 39 CM/S
RIGHT PROFUNDA SYS PSV: 145 CM/S
RIGHT SUPER FEMORAL DIST SYS PSV: 54 CM/S
RIGHT SUPER FEMORAL MID SYS PSV: 111 CM/S
RIGHT SUPER FEMORAL OSTIAL SYS PSV: 95 CM/S
RIGHT SUPER FEMORAL PROX SYS PSV: 257 CM/S
RIGHT TIB/PER TRUNK SYS PSV: 54 CM/S

## 2024-06-08 NOTE — PROGRESS NOTES
Subjective:    Patient ID:  Ruma Nguyen is a 53 y.o. female who presents for Claudication and Coronary Artery Disease        HPI    RECENT LABS NOTED BMP AND THYROID FUNCTION TESTS OK, ARTERIAL DOPPLER ABNORMAL, STILL SMOKES, LEGS GET TIRED EASILY L > R, OCC CHEST TIGHTNESS, SEE ROS  RLE :  Diffuse athero sclerosus with less than 50% stenosis in the common femoral artery and moderate stenosis in the proximal SFA, three-vessel runoff with borderline BRYAN at 0.92    LLE :  Diffuse athero sclerosus, three-vessel runoff with monophasic waveforms throughout the left lower extremity which indicated probable more proximal stenosis at the level of the iliac arteries or higher, mildly to moderately decreased BRYAN at 0.75    Consider angiography if clinically indicated  Past Medical History:   Diagnosis Date    Anticoagulant long-term use     Antiphospholipid syndrome     Cancer     skin, melanoma    Colon polyps     Coronary artery disease     Heart murmur     Hyperlipidemia     Hypertension     PVD (peripheral vascular disease) with claudication 2023    RA (rheumatoid arthritis)     Radiculopathy     lumbar    Rheumatoid arthritis of multiple sites without rheumatoid factor     Thyroid nodule 2023     Past Surgical History:   Procedure Laterality Date    ADENOIDECTOMY      ANGIOGRAM, CORONARY, WITH LEFT HEART CATHETERIZATION  2023    Procedure: Left Heart Cath;  Surgeon: Benjamin Bernal MD;  Location: [facility] CATH;  Service: Cardiology;;    BREAST SURGERY  2006    Augmentation     SECTION      COLONOSCOPY N/A 2020    Procedure: COLONOSCOPY;  Surgeon: Lalo De Leon MD;  Location: [facility] ENDO;  Service: Endoscopy;  Laterality: N/A;    CORONARY ANGIOGRAPHY  2023    Procedure: -;  Surgeon: Benjamin Bernal MD;  Location: [facility] CATH;  Service: Cardiology;;    HERNIA REPAIR      x 3    PERCUTANEOUS CORONARY INTERVENTION, ARTERY  2023    Procedure: RED LCX;  Surgeon: Benjamin Bernal MD;   Location: [facility] CATH;  Service: Cardiology;;    POLYPECTOMY      STENT, DRUG ELUTING, SINGLE VESSEL, CORONARY  7/13/2023    Procedure: -;  Surgeon: Benjamin Bernal MD;  Location: [facility] CATH;  Service: Cardiology;;    THYROIDECTOMY Left 12/27/2023    Procedure: THYROIDECTOMY - LEFT LOBE THYROIDECTOMY;  Surgeon: Pravin Mujica MD;  Location: [facility] OR;  Service: General;  Laterality: Left;    TUBAL LIGATION Bilateral 1995     Family History   Problem Relation Name Age of Onset    Heart disease Mother      Hypertension Mother      Cancer Father      Colon cancer Father      No Known Problems Sister      Diabetes Brother      Kidney cancer Brother       Social History     Socioeconomic History    Marital status:    Tobacco Use    Smoking status: Every Day     Current packs/day: 0.75     Average packs/day: 0.8 packs/day for 38.0 years (28.5 ttl pk-yrs)     Types: Cigarettes    Smokeless tobacco: Never    Tobacco comments:     Needs help to quit; had problems getting chantix   Substance and Sexual Activity    Alcohol use: No    Drug use: No    Sexual activity: Yes     Partners: Male     Social Determinants of Health     Financial Resource Strain: Low Risk  (6/3/2024)    Overall Financial Resource Strain (CARDIA)     Difficulty of Paying Living Expenses: Not hard at all   Food Insecurity: No Food Insecurity (6/3/2024)    Hunger Vital Sign     Worried About Running Out of Food in the Last Year: Never true     Ran Out of Food in the Last Year: Never true   Transportation Needs: No Transportation Needs (12/28/2023)    PRAPARE - Transportation     Lack of Transportation (Medical): No     Lack of Transportation (Non-Medical): No   Physical Activity: Sufficiently Active (6/3/2024)    Exercise Vital Sign     Days of Exercise per Week: 7 days     Minutes of Exercise per Session: 60 min   Stress: Stress Concern Present (6/3/2024)    [employer] - Occupational Stress Questionnaire     Feeling of  Stress : Very much   Housing Stability: Low Risk  (12/28/2023)    Housing Stability Vital Sign     Unable to Pay for Housing in the Last Year: No     Number of Places Lived in the Last Year: 1     Unstable Housing in the Last Year: No       Review of patient's allergies indicates:  No Known Allergies    Current Outpatient Medications:     albuterol-ipratropium (DUO-NEB) 2.5 mg-0.5 mg/3 mL nebulizer solution, Take 3 mLs by nebulization as needed., Disp: , Rfl:     amLODIPine (NORVASC) 5 MG tablet, TAKE 1 TABLET BY MOUTH EVERY DAY IN THE EVENING, Disp: 90 tablet, Rfl: 1    aspirin (ECOTRIN) 81 MG EC tablet, Take 81 mg by mouth every evening., Disp: , Rfl:     clonazePAM (KLONOPIN) 1 MG tablet, Take 1 tablet (1 mg total) by mouth daily as needed for Anxiety. (Patient taking differently: Take 1 mg by mouth 2 (two) times daily as needed for Anxiety.), Disp: 30 tablet, Rfl: 1    clopidogreL (PLAVIX) 75 mg tablet, TAKE 1 TABLET BY MOUTH EVERY DAY, Disp: 90 tablet, Rfl: 1    ergocalciferol (ERGOCALCIFEROL) 50,000 unit Cap, 1 capsule., Disp: , Rfl:     fluticasone propionate (FLONASE) 50 mcg/actuation nasal spray, 1 spray (50 mcg total) by Each Nostril route 2 (two) times a day., Disp: 16 g, Rfl: 11    folic acid (FOLVITE) 1 MG tablet, Take 1 tablet (1 mg total) by mouth every evening., Disp: 90 tablet, Rfl: 3    hydroxychloroquine (PLAQUENIL) 200 mg tablet, Take 1 tablet (200 mg total) by mouth 2 (two) times daily., Disp: 180 tablet, Rfl: 1    methotrexate 25 mg/mL injection, Inject 0.6 mLs (15 mg total) into the muscle every 7 days., Disp: 2 mL, Rfl: 3    mirtazapine (REMERON) 7.5 MG Tab, Take 7.5 mg by mouth every evening., Disp: , Rfl:     nitroGLYCERIN (NITROSTAT) 0.4 MG SL tablet, Place 1 tablet (0.4 mg total) under the tongue every 5 (five) minutes as needed for Chest pain., Disp: 25 tablet, Rfl: 0    pregabalin (LYRICA) 75 MG capsule, Take 1 capsule (75 mg total) by mouth 2 (two) times daily., Disp: 60 capsule, Rfl:  "3    sertraline (ZOLOFT) 50 MG tablet, Take 50 mg by mouth every evening., Disp: , Rfl:     naproxen (NAPROSYN) 500 MG tablet, Take 1 tablet (500 mg total) by mouth 2 (two) times daily with meals. (Patient not taking: Reported on 6/10/2024), Disp: 10 tablet, Rfl: 0    rosuvastatin (CRESTOR) 40 MG Tab, Take 1 tablet (40 mg total) by mouth every evening., Disp: 90 tablet, Rfl: 1    Review of Systems   Constitutional: Negative for decreased appetite, fever, weight gain and weight loss.   HENT:  Positive for ear pain and hearing loss (SIN[place] CHILDHOOD). Negative for congestion and sore throat.    Eyes:  Negative for blurred vision and redness.   Cardiovascular:  Positive for claudication (L > R). Negative for chest pain (OCC), cyanosis, dyspnea on exertion, irregular heartbeat, leg swelling (ANKLES), near-syncope, orthopnea (no PND), palpitations, paroxysmal nocturnal dyspnea and syncope.   Respiratory:  Negative for cough, hemoptysis and wheezing.    Endocrine: Negative.    Hematologic/Lymphatic: Negative for adenopathy. Bruises/bleeds easily: OCC.   Skin: Negative.    Musculoskeletal:  Positive for back pain. Negative for joint pain, muscle cramps and myalgias.   Gastrointestinal:  Negative for abdominal pain, dysphagia, melena, nausea and vomiting.   Genitourinary:  Negative for dysuria and flank pain.   Neurological:  Negative for dizziness, focal weakness, light-headedness, loss of balance and paresthesias (NEUROPATHY).   Psychiatric/Behavioral:  Negative for altered mental status and depression.    Allergic/Immunologic: Negative for hives and persistent infections.        Objective:      Vitals:    06/10/24 0841   BP: 138/74   Pulse: 62   Weight: 68.4 kg (150 lb 12.7 oz)   Height: 5' 5" (1.651 m)   PainSc:   3   PainLoc: Generalized     Body mass index is 25.09 kg/m².    Physical Exam  Constitutional:       Appearance: Normal appearance.   HENT:      Head: Normocephalic and atraumatic.   Eyes:      Extraocular " Movements: Extraocular movements intact.      Conjunctiva/sclera: Conjunctivae normal.   Neck:      Vascular: Carotid bruit present.   Cardiovascular:      Rate and Rhythm: Normal rate. No extrasystoles are present.     Pulses:           Carotid pulses are 2+ on the right side with bruit and 2+ on the left side with bruit.       Radial pulses are 2+ on the right side and 2+ on the left side.        Posterior tibial pulses are 2+ on the right side and 2+ on the left side.      Heart sounds: Murmur heard.      Systolic murmur is present with a grade of 1/6 at the upper right sternal border.      Diastolic murmur is present with a grade of 1/4 at the upper right sternal border.      No friction rub. No gallop.   Pulmonary:      Effort: Pulmonary effort is normal.      Breath sounds: Normal breath sounds and air entry.   Abdominal:      Palpations: Abdomen is soft.      Tenderness: There is no abdominal tenderness.   Musculoskeletal:      Cervical back: Neck supple.      Right lower leg: No edema.      Left lower leg: No edema.   Skin:     Capillary Refill: Capillary refill takes less than 2 seconds.   Neurological:      General: No focal deficit present.      Mental Status: She is alert and oriented to person, place, and time.      Cranial Nerves: Cranial nerve deficit ([place]) present.   Psychiatric:         Mood and Affect: Mood normal.         Speech: Speech normal.         Behavior: Behavior normal.                 ..    Chemistry        Component Value Date/Time     06/10/2024 0942     04/11/2019 1152    K 4.0 06/10/2024 0942     06/10/2024 0942     04/11/2019 1152    CO2 26 06/10/2024 0942    BUN 6 06/10/2024 0942    CREATININE 0.8 06/10/2024 0942    CREATININE 0.71 04/11/2019 1152    GLU 96 06/10/2024 0942    GLU 96 04/11/2019 1152        Component Value Date/Time    CALCIUM 9.7 06/10/2024 0942    ALKPHOS 73 06/10/2024 0942    AST 24 06/10/2024 0942    AST 18 01/27/2016 0915    ALT 20  06/10/2024 0942    BILITOT 0.3 06/10/2024 0942    ESTGFRAFRICA >60 06/05/2020 1457    EGFRNONAA >60 06/05/2020 1457            ..  Lab Results   Component Value Date    CHOL 93 (L) 08/31/2023    CHOL 192 05/01/2023    CHOL 150 01/27/2016     Lab Results   Component Value Date    HDL 27 (L) 08/31/2023    HDL 32 (L) 05/01/2023    HDL 31 (L) 01/27/2016     Lab Results   Component Value Date    LDLCALC 46.4 (L) 08/31/2023    LDLCALC 123.4 05/01/2023    LDLCALC 72.4 01/27/2016     Lab Results   Component Value Date    TRIG 98 08/31/2023    TRIG 183 (H) 05/01/2023    TRIG 233 (H) 01/27/2016     Lab Results   Component Value Date    CHOLHDL 29.0 08/31/2023    CHOLHDL 16.7 (L) 05/01/2023    CHOLHDL 20.7 01/27/2016     ..  Lab Results   Component Value Date    WBC 5.09 06/10/2024    HGB 14.1 06/10/2024    HCT 40.4 06/10/2024     (H) 06/10/2024     06/10/2024       Test(s) Reviewed  I have reviewed the following in detail:  [] Stress test   [] Angiography   [] Echocardiogram   [x] Labs   [x] Other:       Assessment:         ICD-10-CM ICD-9-CM   1. PVD (peripheral vascular disease) with claudication  I73.9 443.9   2. Coronary artery disease involving native coronary artery of native heart without angina pectoris  I25.10 414.01   3. Mild mitral and aortic regurgitation  I08.0 396.3   4. Atherosclerosis of abdominal aorta  I70.0 440.0   5. Mixed dyslipidemia  E78.2 272.2   6. Abnormal ankle brachial index (BRYAN)  R68.89 796.4   7. Tobacco use  Z72.0 305.1     Problem List Items Addressed This Visit          Cardiac/Vascular    Mixed dyslipidemia    Relevant Medications    rosuvastatin (CRESTOR) 40 MG Tab    Atherosclerosis of abdominal aorta    Mild mitral and aortic regurgitation    Coronary artery disease involving native coronary artery of native heart without angina pectoris    PVD (peripheral vascular disease) with claudication - Primary    Relevant Orders    Case Request-Cath Lab: Angiogram, Abdominal Aorta W/  Extremity Runoff (Completed)    Vital signs    Cardiac Monitoring - Adult    Basic metabolic panel    Abnormal ankle brachial index (BRYAN)    Relevant Orders    Case Request-Cath Lab: Angiogram, Abdominal Aorta W/ Extremity Runoff (Completed)    Vital signs    Cardiac Monitoring - Adult    Basic metabolic panel       Other    Tobacco use    Relevant Orders    Ambulatory referral/consult to Smoking Cessation Program        Plan:     EXTENSIVE COUNSELING REGARDING TOBACCO CESSATION WALKING EXERCISE PROGRAM SIGNIFICANT CLAUDICATION SIGNIFICANT PERIPHERAL VASCULAR DISEASE DISCUSSED OPTIONS AT LENGTH WITH THE PATIENT AND HER  PROCEED WITH ANGIOGRAPHY POSSIBLE INTERVENTION PTA RISK AND ALTERNATIVE EXPLAINED PATIENT DETAILS NO ANGINA OR RARE, CLINICAL ARRHYTHMIA DIET EXERCISE, CONTINUE ANTIPLATELET TREATMENT      PVD (peripheral vascular disease) with claudication  -     Case Request-Cath Lab: Angiogram, Abdominal Aorta W/ Extremity Runoff; Standing  -     Establish IV access and maintain saline lock; Standing  -     Hold Warfarin; Standing  -     Hold Enoxaparin/subcutaneous Heparin; Standing  -     Hold Heparin drip; Standing  -     Height and weight; Standing  -     Verify informed consent; Standing  -     Vital signs; Standing  -     Cardiac Monitoring - Adult; Standing  -     Pulse Oximetry Q4H; Standing  -     Diet NPO; Standing  -     CBC auto differential; Standing  -     Basic metabolic panel; Standing    Coronary artery disease involving native coronary artery of native heart without angina pectoris    Mild mitral and aortic regurgitation    Atherosclerosis of abdominal aorta    Mixed dyslipidemia  -     rosuvastatin (CRESTOR) 40 MG Tab; Take 1 tablet (40 mg total) by mouth every evening.  Dispense: 90 tablet; Refill: 1    Abnormal ankle brachial index (BRYAN)  -     Case Request-Cath Lab: Angiogram, Abdominal Aorta W/ Extremity Runoff; Standing  -     Establish IV access and maintain saline lock; Standing  -      Hold Warfarin; Standing  -     Hold Enoxaparin/subcutaneous Heparin; Standing  -     Hold Heparin drip; Standing  -     Height and weight; Standing  -     Verify informed consent; Standing  -     Vital signs; Standing  -     Cardiac Monitoring - Adult; Standing  -     Pulse Oximetry Q4H; Standing  -     Diet NPO; Standing  -     CBC auto differential; Standing  -     Basic metabolic panel; Standing    Tobacco use  -     Ambulatory referral/consult to Smoking Cessation Program; Future; Expected date: 06/17/2024    Other orders  -     0.9%  NaCl infusion  -     aspirin chewable tablet 81 mg  -     clopidogreL tablet 300 mg    RTC Low level/low impact aerobic exercise 5x's/wk. Heart healthy diet and risk factor modification.    See labs and med orders.    Aerobic exercise 5x's/wk. Heart healthy diet and risk factor modification.    See labs and med orders.

## 2024-06-10 ENCOUNTER — LAB VISIT (OUTPATIENT)
Dept: LAB | Facility: HOSPITAL | Age: 54
End: 2024-06-10
Attending: INTERNAL MEDICINE
Payer: COMMERCIAL

## 2024-06-10 ENCOUNTER — OFFICE VISIT (OUTPATIENT)
Dept: CARDIOLOGY | Facility: CLINIC | Age: 54
End: 2024-06-10
Payer: COMMERCIAL

## 2024-06-10 DIAGNOSIS — R68.89 ABNORMAL ANKLE BRACHIAL INDEX (ABI): ICD-10-CM

## 2024-06-10 DIAGNOSIS — D68.61 ANTIPHOSPHOLIPID SYNDROME: ICD-10-CM

## 2024-06-10 DIAGNOSIS — I70.0 ATHEROSCLEROSIS OF ABDOMINAL AORTA: Chronic | ICD-10-CM

## 2024-06-10 DIAGNOSIS — I73.9 PVD (PERIPHERAL VASCULAR DISEASE) WITH CLAUDICATION: Primary | Chronic | ICD-10-CM

## 2024-06-10 DIAGNOSIS — Z72.0 TOBACCO USE: Chronic | ICD-10-CM

## 2024-06-10 DIAGNOSIS — E78.2 MIXED DYSLIPIDEMIA: Chronic | ICD-10-CM

## 2024-06-10 DIAGNOSIS — I25.10 CORONARY ARTERY DISEASE INVOLVING NATIVE CORONARY ARTERY OF NATIVE HEART WITHOUT ANGINA PECTORIS: Chronic | ICD-10-CM

## 2024-06-10 DIAGNOSIS — I08.0 MILD MITRAL AND AORTIC REGURGITATION: Chronic | ICD-10-CM

## 2024-06-10 DIAGNOSIS — M06.09 RHEUMATOID ARTHRITIS OF MULTIPLE SITES WITH NEGATIVE RHEUMATOID FACTOR: ICD-10-CM

## 2024-06-10 LAB
ALBUMIN SERPL BCP-MCNC: 4 G/DL (ref 3.5–5.2)
ALP SERPL-CCNC: 73 U/L (ref 55–135)
ALT SERPL W/O P-5'-P-CCNC: 20 U/L (ref 10–44)
ANION GAP SERPL CALC-SCNC: 10 MMOL/L (ref 8–16)
AST SERPL-CCNC: 24 U/L (ref 10–40)
BASOPHILS # BLD AUTO: 0.05 K/UL (ref 0–0.2)
BASOPHILS NFR BLD: 1 % (ref 0–1.9)
BILIRUB SERPL-MCNC: 0.3 MG/DL (ref 0.1–1)
BUN SERPL-MCNC: 6 MG/DL (ref 6–20)
CALCIUM SERPL-MCNC: 9.7 MG/DL (ref 8.7–10.5)
CHLORIDE SERPL-SCNC: 109 MMOL/L (ref 95–110)
CO2 SERPL-SCNC: 26 MMOL/L (ref 23–29)
CREAT SERPL-MCNC: 0.8 MG/DL (ref 0.5–1.4)
CRP SERPL-MCNC: 4.8 MG/L (ref 0–8.2)
DIFFERENTIAL METHOD BLD: ABNORMAL
EOSINOPHIL # BLD AUTO: 0.2 K/UL (ref 0–0.5)
EOSINOPHIL NFR BLD: 4.1 % (ref 0–8)
ERYTHROCYTE [DISTWIDTH] IN BLOOD BY AUTOMATED COUNT: 13.4 % (ref 11.5–14.5)
ERYTHROCYTE [SEDIMENTATION RATE] IN BLOOD BY PHOTOMETRIC METHOD: 13 MM/HR (ref 0–36)
EST. GFR  (NO RACE VARIABLE): >60 ML/MIN/1.73 M^2
GLUCOSE SERPL-MCNC: 96 MG/DL (ref 70–110)
HCT VFR BLD AUTO: 40.4 % (ref 37–48.5)
HGB BLD-MCNC: 14.1 G/DL (ref 12–16)
IMM GRANULOCYTES # BLD AUTO: 0.01 K/UL (ref 0–0.04)
IMM GRANULOCYTES NFR BLD AUTO: 0.2 % (ref 0–0.5)
LYMPHOCYTES # BLD AUTO: 2 K/UL (ref 1–4.8)
LYMPHOCYTES NFR BLD: 38.3 % (ref 18–48)
MCH RBC QN AUTO: 34.7 PG (ref 27–31)
MCHC RBC AUTO-ENTMCNC: 34.9 G/DL (ref 32–36)
MCV RBC AUTO: 100 FL (ref 82–98)
MONOCYTES # BLD AUTO: 0.2 K/UL (ref 0.3–1)
MONOCYTES NFR BLD: 4.3 % (ref 4–15)
NEUTROPHILS # BLD AUTO: 2.7 K/UL (ref 1.8–7.7)
NEUTROPHILS NFR BLD: 52.1 % (ref 38–73)
NRBC BLD-RTO: 0 /100 WBC
PLATELET # BLD AUTO: 202 K/UL (ref 150–450)
PMV BLD AUTO: 9.4 FL (ref 9.2–12.9)
POTASSIUM SERPL-SCNC: 4 MMOL/L (ref 3.5–5.1)
PROT SERPL-MCNC: 7.3 G/DL (ref 6–8.4)
RBC # BLD AUTO: 4.06 M/UL (ref 4–5.4)
SODIUM SERPL-SCNC: 145 MMOL/L (ref 136–145)
WBC # BLD AUTO: 5.09 K/UL (ref 3.9–12.7)

## 2024-06-10 PROCEDURE — 1159F MED LIST DOCD IN RCRD: CPT | Mod: CPTII,S$GLB,, | Performed by: INTERNAL MEDICINE

## 2024-06-10 PROCEDURE — 3078F DIAST BP <80 MM HG: CPT | Mod: CPTII,S$GLB,, | Performed by: INTERNAL MEDICINE

## 2024-06-10 PROCEDURE — 85651 RBC SED RATE NONAUTOMATED: CPT | Mod: PO | Performed by: INTERNAL MEDICINE

## 2024-06-10 PROCEDURE — 80053 COMPREHEN METABOLIC PANEL: CPT | Mod: PO | Performed by: INTERNAL MEDICINE

## 2024-06-10 PROCEDURE — 99999 PR PBB SHADOW E&M-EST. PATIENT-LVL V: CPT | Mod: PBBFAC,,, | Performed by: INTERNAL MEDICINE

## 2024-06-10 PROCEDURE — 3008F BODY MASS INDEX DOCD: CPT | Mod: CPTII,S$GLB,, | Performed by: INTERNAL MEDICINE

## 2024-06-10 PROCEDURE — 86140 C-REACTIVE PROTEIN: CPT | Performed by: INTERNAL MEDICINE

## 2024-06-10 PROCEDURE — 85025 COMPLETE CBC W/AUTO DIFF WBC: CPT | Mod: PO | Performed by: INTERNAL MEDICINE

## 2024-06-10 PROCEDURE — 85652 RBC SED RATE AUTOMATED: CPT | Performed by: INTERNAL MEDICINE

## 2024-06-10 PROCEDURE — 36415 COLL VENOUS BLD VENIPUNCTURE: CPT | Mod: PO | Performed by: INTERNAL MEDICINE

## 2024-06-10 PROCEDURE — 99215 OFFICE O/P EST HI 40 MIN: CPT | Mod: S$GLB,,, | Performed by: INTERNAL MEDICINE

## 2024-06-10 PROCEDURE — 3075F SYST BP GE 130 - 139MM HG: CPT | Mod: CPTII,S$GLB,, | Performed by: INTERNAL MEDICINE

## 2024-06-10 RX ORDER — MIRTAZAPINE 7.5 MG/1
7.5 TABLET, FILM COATED ORAL NIGHTLY
COMMUNITY
Start: 2024-06-04

## 2024-06-10 RX ORDER — ROSUVASTATIN CALCIUM 40 MG/1
40 TABLET, COATED ORAL NIGHTLY
Qty: 90 TABLET | Refills: 1 | Status: SHIPPED | OUTPATIENT
Start: 2024-06-10

## 2024-06-10 RX ORDER — CLOPIDOGREL BISULFATE 300 MG/1
300 TABLET, FILM COATED ORAL ONCE
OUTPATIENT
Start: 2024-06-10 | End: 2024-06-10

## 2024-06-10 RX ORDER — ERGOCALCIFEROL 1.25 MG/1
1 CAPSULE ORAL
COMMUNITY
Start: 2024-04-17 | End: 2024-08-27

## 2024-06-10 RX ORDER — NAPROXEN SODIUM 220 MG/1
81 TABLET, FILM COATED ORAL ONCE
OUTPATIENT
Start: 2024-06-10 | End: 2024-06-10

## 2024-06-10 RX ORDER — SODIUM CHLORIDE 9 MG/ML
INJECTION, SOLUTION INTRAVENOUS ONCE
OUTPATIENT
Start: 2024-06-10 | End: 2024-06-10

## 2024-06-10 NOTE — PATIENT INSTRUCTIONS
Angiogram    Arrive for procedure at: [facility] on July 23rd at 8am (arrival for 6:30)    You will receive a phone call from [facility] Pre-Op Department with further instructions and exact arrival time prior to your scheduled procedure.    Notify the nurse if you are ALLERGIC TO IODINE.    FASTING: You MAY NOT have anything to eat or drink AFTER MIDNIGHT the day before your procedure. If your procedure is scheduled in the afternoon, you may have a LIGHT BREAKFAST 6-8 hours prior to your procedure.  For example: Two slices of toast; black coffee or black tea.    MEDICATIONS: You may take your regular morning medications with water. If there are any medications that you should not take, you will be instructed to hold them for that morning.    CARDIOLOGY PRE-PROCEDURE MEDICATION ORDERS:  ** Please hold any medications that are checked below:    HOLD   # OF DAYS TO HOLD  Coumadin   Consult with Coumadin Clinic   Xarelto    _DAY BEFORE & DAY OF_  Pradaxa  _ DAY BEFORE & DAY OF _  Eliquis   _ DAY BEFORE & DAY OF _  Metformin    Day before procedure & morning of procedure  Short acting insulin   Morning of procedure  Please hold GLP-1 Drugs such as Semiglutide, Ozempic, Wegovy, and Monjaro 1 week prior to procedure.    CONTINUE the Following Medications   Plavix      Effient     Aspirin    WHAT TO EXPECT:    How long will the procedure take?  The procedure will take an average of 1 - 2 hours to perform.  After the procedure, you will need to lay flat for around 4 - 6 hours to minimize bleeding from the puncture site. If the wrist is accessed you will need to keep your arm still as instructed by the nurse.    When can I go home?  You may be able to be discharged home that same afternoon if there were no complications.  If you have one of the following: balloon; stent; pacemaker or defibrillator procedures, you may spend one night for observation.  Your doctor will determine your discharge based upon your  progress.  The results of your procedure will be discussed with you before you are discharged.  Any further testing or procedures will be scheduled for you either before you leave or you will be instructed to call for a future appointment.      TRANSPORTATION:  PLEASE ARRANGE TO HAVE SOMEONE DRIVE YOU HOME FOLLOWING YOUR PROCEDURE, YOU WILL NOT BE ALLOWED TO DRIVE.

## 2024-06-11 VITALS
WEIGHT: 150.81 LBS | HEIGHT: 65 IN | DIASTOLIC BLOOD PRESSURE: 74 MMHG | SYSTOLIC BLOOD PRESSURE: 138 MMHG | HEART RATE: 62 BPM | BODY MASS INDEX: 25.13 KG/M2

## 2024-06-16 DIAGNOSIS — M06.09 RHEUMATOID ARTHRITIS OF MULTIPLE SITES WITH NEGATIVE RHEUMATOID FACTOR: ICD-10-CM

## 2024-06-16 DIAGNOSIS — D68.61 ANTIPHOSPHOLIPID SYNDROME: ICD-10-CM

## 2024-06-17 ENCOUNTER — TELEPHONE (OUTPATIENT)
Dept: ENDOCRINOLOGY | Facility: CLINIC | Age: 54
End: 2024-06-17
Payer: COMMERCIAL

## 2024-06-17 RX ORDER — METHOTREXATE 25 MG/ML
15 INJECTION, SOLUTION INTRA-ARTERIAL; INTRAMUSCULAR; INTRAVENOUS
Qty: 6 ML | Refills: 1 | Status: SHIPPED | OUTPATIENT
Start: 2024-06-17

## 2024-06-17 NOTE — TELEPHONE ENCOUNTER
Called patient 3 times to reschedule. no answer, left message.    ----- Message from Carmita Hicks sent at 6/17/2024  2:08 PM CDT -----  Regarding: Appt  Contact: Pt [phone]  Pt is calling to reschedule appt states she thought appt was in [place] no dates please call

## 2024-06-20 ENCOUNTER — PATIENT MESSAGE (OUTPATIENT)
Dept: RHEUMATOLOGY | Facility: CLINIC | Age: 54
End: 2024-06-20

## 2024-06-20 ENCOUNTER — OFFICE VISIT (OUTPATIENT)
Dept: RHEUMATOLOGY | Facility: CLINIC | Age: 54
End: 2024-06-20
Payer: COMMERCIAL

## 2024-06-20 DIAGNOSIS — D68.61 ANTIPHOSPHOLIPID SYNDROME: ICD-10-CM

## 2024-06-20 DIAGNOSIS — D84.821 DRUG-INDUCED IMMUNODEFICIENCY: ICD-10-CM

## 2024-06-20 DIAGNOSIS — M06.09 RHEUMATOID ARTHRITIS OF MULTIPLE SITES WITH NEGATIVE RHEUMATOID FACTOR: Primary | ICD-10-CM

## 2024-06-20 DIAGNOSIS — Z51.81 ENCOUNTER FOR MEDICATION MONITORING: ICD-10-CM

## 2024-06-20 DIAGNOSIS — M54.12 CERVICAL RADICULOPATHY AT C6: ICD-10-CM

## 2024-06-20 DIAGNOSIS — Z79.899 DRUG-INDUCED IMMUNODEFICIENCY: ICD-10-CM

## 2024-06-20 PROCEDURE — 1159F MED LIST DOCD IN RCRD: CPT | Mod: CPTII,95,, | Performed by: INTERNAL MEDICINE

## 2024-06-20 PROCEDURE — 1160F RVW MEDS BY RX/DR IN RCRD: CPT | Mod: CPTII,95,, | Performed by: INTERNAL MEDICINE

## 2024-06-20 PROCEDURE — 99214 OFFICE O/P EST MOD 30 MIN: CPT | Mod: 95,,, | Performed by: INTERNAL MEDICINE

## 2024-06-20 NOTE — PROGRESS NOTES
RHEUMATOLOGY FOLLOW UP - TELE VISIT     The patient location is: [place]  The chief complaint leading to consultation is: Follow-up for rheumatoid arthritis  Visit type: Virtual visit with synchronous audio and video  Total time spent with patient:  10 minutes  Each patient to whom he or she provides medical services by telemedicine is:  (1) informed of the relationship between the physician and patient and the respective role of any other health care provider with respect to management of the patient; and (2) notified that he or she may decline to receive medical services by telemedicine and may withdraw from such care at any time.    Chief complaints, HPI, ROS, EXAM, Assessment & Plans:-  Ruma Sanchez a 53 y.o. pleasant female seen today for follow-up visit.  Seronegative rheumatoid arthritis and antiphospholipid antibody syndrome managed by external rheumatologist for several years recently established care with me year.  Restarted on methotrexate.  Reports significant improvement of joint pain, swelling and stiffness since restarting methotrexate.  Severe peripheral arterial disease.  Scheduled to undergo angiogram of lower extremities next week.  Cervical pain resolved with topical menthol creams.  Rheumatological review of system negative otherwise.  100% fist formation bilateral hands..    1. Rheumatoid arthritis of multiple sites with negative rheumatoid factor    2. Antiphospholipid syndrome    3. Cervical radiculopathy at C6    4. Drug-induced immunodeficiency    5. Encounter for medication monitoring      Problem List Items Addressed This Visit       Antiphospholipid syndrome    Cervical radiculopathy at C6    Drug-induced immunodeficiency    Encounter for medication monitoring    Rheumatoid arthritis of multiple sites with negative rheumatoid factor - Primary      Latest Reference Range & Units 06/10/24 09:42   WBC 3.90 - 12.70 K/uL 5.09   RBC 4.00 - 5.40 M/uL 4.06   Hemoglobin 12.0 - 16.0 g/dL 14.1    Hematocrit 37.0 - 48.5 % 40.4   MCV 82 - 98 fL 100 (H)   MCH 27.0 - 31.0 pg 34.7 (H)   MCHC 32.0 - 36.0 g/dL 34.9   RDW 11.5 - 14.5 % 13.4   Platelet Count 150 - 450 K/uL 202   MPV 9.2 - 12.9 fL 9.4   Gran % 38.0 - 73.0 % 52.1   Lymph % 18.0 - 48.0 % 38.3   Mono % 4.0 - 15.0 % 4.3   Eos % 0.0 - 8.0 % 4.1   Basophil % 0.0 - 1.9 % 1.0   Immature Granulocytes 0.0 - 0.5 % 0.2   Gran # (ANC) 1.8 - 7.7 K/uL 2.7   Lymph # 1.0 - 4.8 K/uL 2.0   Mono # 0.3 - 1.0 K/uL 0.2 (L)   Eos # 0.0 - 0.5 K/uL 0.2   Baso # 0.00 - 0.20 K/uL 0.05   Immature Grans (Abs) 0.00 - 0.04 K/uL 0.01   nRBC 0 /100 WBC 0   Differential Method  Automated   Sed Rate 0 - 36 mm/Hr 13   Sodium 136 - 145 mmol/L 145   Potassium 3.5 - 5.1 mmol/L 4.0   Chloride 95 - 110 mmol/L 109   CO2 23 - 29 mmol/L 26   Anion Gap 8 - 16 mmol/L 10   BUN 6 - 20 mg/dL 6   Creatinine 0.5 - 1.4 mg/dL 0.8   eGFR >60 mL/min/1.73 m^2 >60   Glucose 70 - 110 mg/dL 96   Calcium 8.7 - 10.5 mg/dL 9.7   ALP 55 - 135 U/L 73   PROTEIN TOTAL 6.0 - 8.4 g/dL 7.3   Albumin 3.5 - 5.2 g/dL 4.0   BILIRUBIN TOTAL 0.1 - 1.0 mg/dL 0.3   AST 10 - 40 U/L 24   ALT 10 - 44 U/L 20   CRP 0.0 - 8.2 mg/L 4.8   (H): Data is abnormally high  (L): Data is abnormally low      Significant improvement of seronegative rheumatoid and antiphospholipid antibody syndrome associated inflammatory arthritis on methotrexate.  Continue weekly methotrexate injectable medication with folic acid daily.  Hold methotrexate any infection.  Continue Lyrica for fibromyalgia.  Continue Plaquenil twice daily.  Safety labs every visit.  Continue follow-up with vascular surgeon for severe peripheral arterial disease.  No Raynaud's.  I have explained all of the above in detail and the patient understands all of the current recommendation(s). I have answered all questions to the best of my ability and to their complete satisfaction.   # Follow up in about 6 months (around 12/20/2024).      Past Medical History:   Diagnosis Date     Anticoagulant long-term use     Antiphospholipid syndrome     Cancer     skin, melanoma    Colon polyps     Coronary artery disease     Heart murmur     Hyperlipidemia     Hypertension     PVD (peripheral vascular disease) with claudication 2023    RA (rheumatoid arthritis)     Radiculopathy     lumbar    Rheumatoid arthritis of multiple sites without rheumatoid factor     Thyroid nodule 2023       Past Surgical History:   Procedure Laterality Date    ADENOIDECTOMY      ANGIOGRAM, CORONARY, WITH LEFT HEART CATHETERIZATION  2023    Procedure: Left Heart Cath;  Surgeon: Benjamin Bernal MD;  Location: [facility] CATH;  Service: Cardiology;;    BREAST SURGERY  2006    Augmentation     SECTION      COLONOSCOPY N/A 2020    Procedure: COLONOSCOPY;  Surgeon: Lalo De Leon MD;  Location: [facility] ENDO;  Service: Endoscopy;  Laterality: N/A;    CORONARY ANGIOGRAPHY  2023    Procedure: -;  Surgeon: Benjamin Bernal MD;  Location: [facility] CATH;  Service: Cardiology;;    HERNIA REPAIR      x 3    PERCUTANEOUS CORONARY INTERVENTION, ARTERY  2023    Procedure: RED LCX;  Surgeon: Benjamin Bernal MD;  Location: [facility] CATH;  Service: Cardiology;;    POLYPECTOMY      STENT, DRUG ELUTING, SINGLE VESSEL, CORONARY  2023    Procedure: -;  Surgeon: Benjamin Bernal MD;  Location: [facility] CATH;  Service: Cardiology;;    THYROIDECTOMY Left 2023    Procedure: THYROIDECTOMY - LEFT LOBE THYROIDECTOMY;  Surgeon: Pravin Mujica MD;  Location: [facility] OR;  Service: General;  Laterality: Left;    TUBAL LIGATION Bilateral         Social History     Tobacco Use    Smoking status: Every Day     Current packs/day: 0.75     Average packs/day: 0.8 packs/day for 38.0 years (28.5 ttl pk-yrs)     Types: Cigarettes    Smokeless tobacco: Never    Tobacco comments:     Needs help to quit; had problems getting chantix   Substance Use Topics    Alcohol use: No    Drug use: No       Family History   Problem Relation Name Age of  Onset    Heart disease Mother      Hypertension Mother      Cancer Father      Colon cancer Father      No Known Problems Sister      Diabetes Brother      Kidney cancer Brother         Review of patient's allergies indicates:  No Known Allergies    Medication List with Changes/Refills   Current Medications    ALBUTEROL-IPRATROPIUM (DUO-NEB) 2.5 MG-0.5 MG/3 ML NEBULIZER SOLUTION    Take 3 mLs by nebulization as needed.    AMLODIPINE (NORVASC) 5 MG TABLET    TAKE 1 TABLET BY MOUTH EVERY DAY IN THE EVENING    ASPIRIN (ECOTRIN) 81 MG EC TABLET    Take 81 mg by mouth every evening.    CLONAZEPAM (KLONOPIN) 1 MG TABLET    Take 1 tablet (1 mg total) by mouth daily as needed for Anxiety.    CLOPIDOGREL (PLAVIX) 75 MG TABLET    TAKE 1 TABLET BY MOUTH EVERY DAY    ERGOCALCIFEROL (ERGOCALCIFEROL) 50,000 UNIT CAP    1 capsule.    FLUTICASONE PROPIONATE (FLONASE) 50 MCG/ACTUATION NASAL SPRAY    1 spray (50 mcg total) by Each Nostril route 2 (two) times a day.    FOLIC ACID (FOLVITE) 1 MG TABLET    Take 1 tablet (1 mg total) by mouth every evening.    HYDROXYCHLOROQUINE (PLAQUENIL) 200 MG TABLET    Take 1 tablet (200 mg total) by mouth 2 (two) times daily.    METHOTREXATE 25 MG/ML INJECTION    INJECT 0.6 MLS (15 MG TOTAL) INTO THE MUSCLE EVERY 7 DAYS.    MIRTAZAPINE (REMERON) 7.5 MG TAB    Take 7.5 mg by mouth every evening.    NAPROXEN (NAPROSYN) 500 MG TABLET    Take 1 tablet (500 mg total) by mouth 2 (two) times daily with meals.    NITROGLYCERIN (NITROSTAT) 0.4 MG SL TABLET    Place 1 tablet (0.4 mg total) under the tongue every 5 (five) minutes as needed for Chest pain.    PREGABALIN (LYRICA) 75 MG CAPSULE    Take 1 capsule (75 mg total) by mouth 2 (two) times daily.    ROSUVASTATIN (CRESTOR) 40 MG TAB    Take 1 tablet (40 mg total) by mouth every evening.    SERTRALINE (ZOLOFT) 50 MG TABLET    Take 50 mg by mouth every evening.       Disclaimer: This note was prepared using voice recognition system and is likely to have  sound alike errors and is not proof read.  Please call me with any questions.

## 2024-06-24 ENCOUNTER — OFFICE VISIT (OUTPATIENT)
Dept: ENDOCRINOLOGY | Facility: CLINIC | Age: 54
End: 2024-06-24
Payer: COMMERCIAL

## 2024-06-24 VITALS
BODY MASS INDEX: 25.6 KG/M2 | WEIGHT: 159.31 LBS | HEART RATE: 76 BPM | OXYGEN SATURATION: 95 % | DIASTOLIC BLOOD PRESSURE: 64 MMHG | SYSTOLIC BLOOD PRESSURE: 116 MMHG | HEIGHT: 66 IN

## 2024-06-24 DIAGNOSIS — C73 THYROID CANCER: Primary | ICD-10-CM

## 2024-06-24 PROCEDURE — 99204 OFFICE O/P NEW MOD 45 MIN: CPT | Mod: S$GLB,,, | Performed by: INTERNAL MEDICINE

## 2024-06-24 PROCEDURE — 3008F BODY MASS INDEX DOCD: CPT | Mod: CPTII,S$GLB,, | Performed by: INTERNAL MEDICINE

## 2024-06-24 PROCEDURE — 1159F MED LIST DOCD IN RCRD: CPT | Mod: CPTII,S$GLB,, | Performed by: INTERNAL MEDICINE

## 2024-06-24 PROCEDURE — 99999 PR PBB SHADOW E&M-EST. PATIENT-LVL V: CPT | Mod: PBBFAC,,, | Performed by: INTERNAL MEDICINE

## 2024-06-24 PROCEDURE — 3074F SYST BP LT 130 MM HG: CPT | Mod: CPTII,S$GLB,, | Performed by: INTERNAL MEDICINE

## 2024-06-24 PROCEDURE — 3078F DIAST BP <80 MM HG: CPT | Mod: CPTII,S$GLB,, | Performed by: INTERNAL MEDICINE

## 2024-06-24 PROCEDURE — 1160F RVW MEDS BY RX/DR IN RCRD: CPT | Mod: CPTII,S$GLB,, | Performed by: INTERNAL MEDICINE

## 2024-06-24 RX ORDER — NALOXONE HYDROCHLORIDE 4 MG/.1ML
SPRAY NASAL
COMMUNITY

## 2024-06-24 RX ORDER — QUETIAPINE FUMARATE 50 MG/1
TABLET, FILM COATED ORAL
COMMUNITY

## 2024-06-24 RX ORDER — AMOXICILLIN 500 MG/1
TABLET, FILM COATED ORAL
COMMUNITY
Start: 2024-04-14 | End: 2024-06-26 | Stop reason: ALTCHOICE

## 2024-06-24 RX ORDER — ARIPIPRAZOLE 2 MG/1
1 TABLET ORAL DAILY
COMMUNITY
End: 2024-06-26

## 2024-06-24 NOTE — PROGRESS NOTES
CHIEF COMPLAINT:  Oncocytic follicular variant papillary thyroid cancer  53 y.o. old being seen as a new patient. Was seeing DR. Suarez.  In 2023 had an FNA that was suspicious for follicular neoplasm.  Subsequently had a left lobectomy in December of 2023 (Dr. Pravin Mujica) showing oncocytic follicular variant papillary thyroid cancer. States had an Ultrasound done a few months ago that showed a  nodule. Having fatigue x 3 months. No Diarrhea or constipation. Occasional palpitations. Anxiety controlled. Has been feeling more hot.       08/24/2023  LEFT THYROID, FINE NEEDLE ASPIRATE:   - SUSPICIOUS FOR FOLLICULAR NEOPLASM: HURTHLE CELL TYPE.       12/27/2023  Left lobectomy  Procedure   - Left lobectomy     TUMOR   Tumor Focality   - Unifocal     Tumor Site   - Left lobe     Tumor Size   - 2.2 x 1.4 x 1.0 cm.     Histologic Tumor Types and Subtypes   - Oncocytic follicular variant papillary carcinoma     Tumor Proliferative Activity   Mitotic Rate   - Less than 3 mitoses per 2mm2     Tumor Necrosis   - Not identified     Angioinvasion (vascular invasion)   - Not identified     Lymphatic Invasion   - Present     Perineural Invasion   - Not identified     Extrathyroidal Extension   - Not identified     Margin Status   - All margins negative for carcinoma           PAST MEDICAL HISTORY/PAST SURGICAL HISTORY:  Reviewed in [facility]    SOCIAL HISTORY: Reviewed in [facility]    FAMILY HISTORY:  1st cousin had thyroidectomy. NO known thyroid cancer. Son with DM 1.     MEDICATIONS/ALLERGIES: The patient's MedCard has been updated and reviewed.            PE:    GENERAL: Well developed, well nourished.  NECK: Supple, trachea midline, no palpable thyroid nodules.  Surgical scar intact  CHEST: Resp even and unlabored, CTA bilateral.  CARDIAC: RRR, S1, S2 heard, no murmurs, rubs, S3, or S4    LABS/Radiology    US THYROID     CLINICAL HISTORY:  Nontoxic multinodular goiter     TECHNIQUE:  Ultrasound of the thyroid and cervical lymph nodes  was performed.     COMPARISON:  Carotid exam performed Nicole 15, 2020     FINDINGS:  The right lobe of the thyroid measures 4.3 x 1.8 x 1.6 cm.  The left lobe of the thyroid measures 4.6 x 1.8 x 1.7 cm.  The overall thyroid volume is within normal limits is a at approximately 13 cc.  There is a hypoechoic solid nodule which is well-defined in the inferior right thyroid lobe measuring 8 x 7 x 6 mm.  No definite microcalcifications are seen.  In the left thyroid lobe there is a dominant lesion containing shadowing macro calcifications measuring 2.2 x 1.6 x 1.6 cm.  Inferiorly there is a hypoechoic nodule measuring 5 x 5 x 8 mm.     Impression:     Dominant nodule in the left lobe of the thyroid measuring 2.2 cm for which consideration of fine needle aspiration is advised.       Latest Reference Range & Units 06/05/20 14:57 07/25/23 14:28 03/13/24 12:41 03/13/24 14:48   TSH 0.400 - 4.000 uIU/mL 2.430 2.530 2.772 3.476   T3, Free 2.3 - 4.2 pg/mL  2.88  3.1   Free T4 0.71 - 1.51 ng/dL  0.78  0.69 (L)   Thyroperoxidase Antibodies <6.0 IU/mL    <6.0   Thyroid-Stim IG Quantitative <0.10 IU/L    <0.10   (L): Data is abnormally low    ASSESSMENT/PLAN:  1. Oncocytic follicular variant papillary thyroid cancer-status post left lobectomy.  Has a 2.2 cm area of thyroid cancer.  See pathology report above.  Right lobe remains.  There does appear to be a subcentimeter right nodule.  Independently reviewed ultrasound done prior to surgery.  Patient very anxious about keeping the remaining low been.  Discussed risks of thyroid nodules and risk of thyroid cancer.  She is very hesitant to watch it.  At this point, would recommend a repeat ultrasound to compare the nodule on the remaining low to the previous ultrasound.  Check thyroglobulin.  Discussed the thyroglobulin will be detectable since she does have normal thyroid tissue.  Would like to verify that it has not excessively high.  Also check TSH to assess need for L T4.  Last free  T4 slightly low.      FOLLOWUP  Needs TSH, free T4, thyroglobulin, thyroid ultrasound

## 2024-06-25 ENCOUNTER — HOSPITAL ENCOUNTER (OUTPATIENT)
Dept: RADIOLOGY | Facility: HOSPITAL | Age: 54
Discharge: HOME | End: 2024-06-25
Attending: INTERNAL MEDICINE
Payer: COMMERCIAL

## 2024-06-25 DIAGNOSIS — C73 THYROID CANCER: ICD-10-CM

## 2024-06-25 PROCEDURE — 76536 US EXAM OF HEAD AND NECK: CPT | Mod: TC,PO

## 2024-06-25 PROCEDURE — 76536 US EXAM OF HEAD AND NECK: CPT | Mod: 26,,, | Performed by: RADIOLOGY

## 2024-06-28 ENCOUNTER — PATIENT MESSAGE (OUTPATIENT)
Dept: ENDOCRINOLOGY | Facility: CLINIC | Age: 54
End: 2024-06-28
Payer: COMMERCIAL

## 2024-07-16 ENCOUNTER — TELEPHONE (OUTPATIENT)
Dept: CARDIOLOGY | Facility: CLINIC | Age: 54
End: 2024-07-16
Payer: COMMERCIAL

## 2024-07-16 NOTE — TELEPHONE ENCOUNTER
----- Message from Aggie Acevedo sent at 7/16/2024 11:53 AM CDT -----  Regarding: Needs return call  Type: Needs Medical Advice  Who Called:  Sid    Best Call Back Number: [phone]    Additional Information: Pt was wanting to touch base with the office regarding her upcoming procedure and what she should do about her plavix and aspirin medication please call to [facility]

## 2024-07-16 NOTE — TELEPHONE ENCOUNTER
----- Message from Aggie Acevedo sent at 7/16/2024  1:39 PM CDT -----  Regarding: Returning call  Type:  Patient Returning Call    Who Called:  Pt  Who Left Message for Patient:  Jami  Does the patient know what this is regarding?:  yes  Best Call Back Number:  [phone]    Additional Information:

## 2024-07-17 ENCOUNTER — PATIENT MESSAGE (OUTPATIENT)
Dept: CARDIOLOGY | Facility: CLINIC | Age: 54
End: 2024-07-17
Payer: COMMERCIAL

## 2024-07-22 ENCOUNTER — OFFICE VISIT (OUTPATIENT)
Dept: ENDOCRINOLOGY | Facility: CLINIC | Age: 54
End: 2024-07-22
Payer: COMMERCIAL

## 2024-07-22 DIAGNOSIS — E04.1 THYROID NODULE: ICD-10-CM

## 2024-07-22 DIAGNOSIS — C73 THYROID CANCER: Primary | ICD-10-CM

## 2024-07-22 PROCEDURE — 99499 UNLISTED E&M SERVICE: CPT | Mod: S$GLB,,, | Performed by: INTERNAL MEDICINE

## 2024-07-22 PROCEDURE — 88173 CYTOPATH EVAL FNA REPORT: CPT | Mod: PO | Performed by: STUDENT IN AN ORGANIZED HEALTH CARE EDUCATION/TRAINING PROGRAM

## 2024-07-22 PROCEDURE — 10005 FNA BX W/US GDN 1ST LES: CPT | Mod: S$GLB,,, | Performed by: INTERNAL MEDICINE

## 2024-07-22 PROCEDURE — 99999 PR PBB SHADOW E&M-EST. PATIENT-LVL I: CPT | Mod: PBBFAC,,, | Performed by: INTERNAL MEDICINE

## 2024-07-22 NOTE — PROGRESS NOTES
Thyroid FNA under US Guidance    Indication:  Ultrasound guidance for fine needle aspiration biopsy  right nodule  Procedure time out noted. Patient's name, , and location of biopsy were verified with patient. Discussed risks of procedure and risks of a non diagnostic/Indeterminate/FLUS/AUS biopsy. Discussed that molecular markers will only be sent after approval from the patient. Advised calling to determine out of pocket costs for molecular markers    Real time ultrasound was performed in 2 planes using a Digicompanion ultrasound machine and 12 MHz probe.   The   right nodule was identified and described in report.  Informed consent obtained and questions answered. FNA guidance was performed using direct u/s guidance to confirm accurate needle placement.  5aspirations were made using 25 gauge needles. Images taken of FNA as seen below.  4 Samples were submitted for cytology.  The patient tolerated the procedure well without complication.  After care instructions were provided to the patient.      Impression:  Uncomplicated FNA biopsy of  right thyroid nodule under U/S guidance. 1 sample saved for potential molecular markers

## 2024-07-23 ENCOUNTER — PATIENT MESSAGE (OUTPATIENT)
Dept: ENDOCRINOLOGY | Facility: CLINIC | Age: 54
End: 2024-07-23
Payer: COMMERCIAL

## 2024-07-23 LAB
FINAL PATHOLOGIC DIAGNOSIS: NORMAL
Lab: NORMAL

## 2024-08-06 ENCOUNTER — LAB VISIT (OUTPATIENT)
Dept: LAB | Facility: HOSPITAL | Age: 54
End: 2024-08-06
Attending: INTERNAL MEDICINE
Payer: COMMERCIAL

## 2024-08-06 DIAGNOSIS — C73 THYROID CANCER: ICD-10-CM

## 2024-08-06 LAB — TSH SERPL DL<=0.005 MIU/L-ACNC: 3.42 UIU/ML (ref 0.4–4)

## 2024-08-06 PROCEDURE — 36415 COLL VENOUS BLD VENIPUNCTURE: CPT | Mod: PO | Performed by: INTERNAL MEDICINE

## 2024-08-06 PROCEDURE — 84443 ASSAY THYROID STIM HORMONE: CPT | Performed by: INTERNAL MEDICINE

## 2024-08-14 DIAGNOSIS — I25.10 CORONARY ARTERY DISEASE INVOLVING NATIVE CORONARY ARTERY OF NATIVE HEART WITHOUT ANGINA PECTORIS: ICD-10-CM

## 2024-08-14 DIAGNOSIS — I77.9 MILD CAROTID ARTERY DISEASE: Primary | ICD-10-CM

## 2024-08-14 RX ORDER — CLOPIDOGREL BISULFATE 75 MG/1
75 TABLET ORAL
Qty: 90 TABLET | Refills: 0 | Status: SHIPPED | OUTPATIENT
Start: 2024-08-14

## 2024-10-08 ENCOUNTER — LAB VISIT (OUTPATIENT)
Dept: LAB | Facility: HOSPITAL | Age: 54
End: 2024-10-08
Attending: INTERNAL MEDICINE
Payer: COMMERCIAL

## 2024-10-08 DIAGNOSIS — D68.61 ANTIPHOSPHOLIPID SYNDROME: ICD-10-CM

## 2024-10-08 DIAGNOSIS — M06.09 RHEUMATOID ARTHRITIS OF MULTIPLE SITES WITH NEGATIVE RHEUMATOID FACTOR: ICD-10-CM

## 2024-10-08 LAB
ALBUMIN SERPL BCP-MCNC: 4.3 G/DL (ref 3.5–5.2)
ALP SERPL-CCNC: 71 U/L (ref 55–135)
ALT SERPL W/O P-5'-P-CCNC: 26 U/L (ref 10–44)
ANION GAP SERPL CALC-SCNC: 10 MMOL/L (ref 8–16)
AST SERPL-CCNC: 24 U/L (ref 10–40)
BASOPHILS # BLD AUTO: 0.05 K/UL (ref 0–0.2)
BASOPHILS NFR BLD: 0.8 % (ref 0–1.9)
BILIRUB SERPL-MCNC: 0.2 MG/DL (ref 0.1–1)
BUN SERPL-MCNC: 9 MG/DL (ref 6–20)
CALCIUM SERPL-MCNC: 9.8 MG/DL (ref 8.7–10.5)
CHLORIDE SERPL-SCNC: 108 MMOL/L (ref 95–110)
CO2 SERPL-SCNC: 26 MMOL/L (ref 23–29)
CREAT SERPL-MCNC: 0.8 MG/DL (ref 0.5–1.4)
CRP SERPL-MCNC: 0.6 MG/L (ref 0–8.2)
DIFFERENTIAL METHOD BLD: ABNORMAL
EOSINOPHIL # BLD AUTO: 0.2 K/UL (ref 0–0.5)
EOSINOPHIL NFR BLD: 3.6 % (ref 0–8)
ERYTHROCYTE [DISTWIDTH] IN BLOOD BY AUTOMATED COUNT: 13.6 % (ref 11.5–14.5)
EST. GFR  (NO RACE VARIABLE): >60 ML/MIN/1.73 M^2
GLUCOSE SERPL-MCNC: 94 MG/DL (ref 70–110)
HCT VFR BLD AUTO: 40.9 % (ref 37–48.5)
HGB BLD-MCNC: 14.3 G/DL (ref 12–16)
IMM GRANULOCYTES # BLD AUTO: 0.02 K/UL (ref 0–0.04)
IMM GRANULOCYTES NFR BLD AUTO: 0.3 % (ref 0–0.5)
LYMPHOCYTES # BLD AUTO: 2.5 K/UL (ref 1–4.8)
LYMPHOCYTES NFR BLD: 39.7 % (ref 18–48)
MCH RBC QN AUTO: 35.2 PG (ref 27–31)
MCHC RBC AUTO-ENTMCNC: 35 G/DL (ref 32–36)
MCV RBC AUTO: 101 FL (ref 82–98)
MONOCYTES # BLD AUTO: 0.3 K/UL (ref 0.3–1)
MONOCYTES NFR BLD: 4.7 % (ref 4–15)
NEUTROPHILS # BLD AUTO: 3.1 K/UL (ref 1.8–7.7)
NEUTROPHILS NFR BLD: 50.9 % (ref 38–73)
NRBC BLD-RTO: 0 /100 WBC
PLATELET # BLD AUTO: 250 K/UL (ref 150–450)
PMV BLD AUTO: 9.5 FL (ref 9.2–12.9)
POTASSIUM SERPL-SCNC: 3.7 MMOL/L (ref 3.5–5.1)
PROT SERPL-MCNC: 7.5 G/DL (ref 6–8.4)
RBC # BLD AUTO: 4.06 M/UL (ref 4–5.4)
SODIUM SERPL-SCNC: 144 MMOL/L (ref 136–145)
WBC # BLD AUTO: 6.17 K/UL (ref 3.9–12.7)

## 2024-10-08 PROCEDURE — 86140 C-REACTIVE PROTEIN: CPT | Performed by: INTERNAL MEDICINE

## 2024-10-08 PROCEDURE — 85651 RBC SED RATE NONAUTOMATED: CPT | Mod: PO | Performed by: INTERNAL MEDICINE

## 2024-10-08 PROCEDURE — 85652 RBC SED RATE AUTOMATED: CPT | Performed by: INTERNAL MEDICINE

## 2024-10-08 PROCEDURE — 86147 CARDIOLIPIN ANTIBODY EA IG: CPT | Performed by: INTERNAL MEDICINE

## 2024-10-08 PROCEDURE — 85025 COMPLETE CBC W/AUTO DIFF WBC: CPT | Mod: PO | Performed by: INTERNAL MEDICINE

## 2024-10-08 PROCEDURE — 36415 COLL VENOUS BLD VENIPUNCTURE: CPT | Mod: PO | Performed by: INTERNAL MEDICINE

## 2024-10-08 PROCEDURE — 86146 BETA-2 GLYCOPROTEIN ANTIBODY: CPT | Mod: 59 | Performed by: INTERNAL MEDICINE

## 2024-10-08 PROCEDURE — 80053 COMPREHEN METABOLIC PANEL: CPT | Mod: PO | Performed by: INTERNAL MEDICINE

## 2024-10-08 PROCEDURE — 85613 RUSSELL VIPER VENOM DILUTED: CPT | Performed by: INTERNAL MEDICINE

## 2024-10-08 PROCEDURE — 85730 THROMBOPLASTIN TIME PARTIAL: CPT | Performed by: INTERNAL MEDICINE

## 2024-10-08 PROCEDURE — 85610 PROTHROMBIN TIME: CPT | Performed by: INTERNAL MEDICINE

## 2024-10-09 LAB — ERYTHROCYTE [SEDIMENTATION RATE] IN BLOOD BY PHOTOMETRIC METHOD: 8 MM/HR (ref 0–36)

## 2024-10-11 ENCOUNTER — OFFICE VISIT (OUTPATIENT)
Dept: CARDIOLOGY | Facility: CLINIC | Age: 54
End: 2024-10-11
Payer: COMMERCIAL

## 2024-10-11 VITALS
SYSTOLIC BLOOD PRESSURE: 118 MMHG | BODY MASS INDEX: 25.4 KG/M2 | WEIGHT: 158.06 LBS | HEART RATE: 64 BPM | DIASTOLIC BLOOD PRESSURE: 72 MMHG | HEIGHT: 66 IN

## 2024-10-11 DIAGNOSIS — E78.2 MIXED DYSLIPIDEMIA: Chronic | ICD-10-CM

## 2024-10-11 DIAGNOSIS — Z71.6 TOBACCO ABUSE COUNSELING: ICD-10-CM

## 2024-10-11 DIAGNOSIS — Z79.02 LONG TERM (CURRENT) USE OF ANTITHROMBOTICS/ANTIPLATELETS: Chronic | ICD-10-CM

## 2024-10-11 DIAGNOSIS — I73.9 PVD (PERIPHERAL VASCULAR DISEASE): Chronic | ICD-10-CM

## 2024-10-11 DIAGNOSIS — I25.10 CORONARY ARTERY DISEASE INVOLVING NATIVE CORONARY ARTERY OF NATIVE HEART WITHOUT ANGINA PECTORIS: Primary | Chronic | ICD-10-CM

## 2024-10-11 DIAGNOSIS — I08.0 MILD MITRAL AND AORTIC REGURGITATION: Chronic | ICD-10-CM

## 2024-10-11 DIAGNOSIS — I70.0 ATHEROSCLEROSIS OF ABDOMINAL AORTA: ICD-10-CM

## 2024-10-11 DIAGNOSIS — I77.9 MILD CAROTID ARTERY DISEASE: Chronic | ICD-10-CM

## 2024-10-11 LAB
CONFIRM DRVVT STA-STACLOT: NORMAL S
DRVVT SCREEN TO CONFIRM RATIO: NORMAL {RATIO}
HEPARIN NT PPP QL: NORMAL
LA 3 SCREEN W REFLEX-IMP: NORMAL
LMW HEPARIN IND PLT AB SER QL: NORMAL
MIXING DRVVT/NORMAL: NORMAL %
NEUTRALIZED DRVVT SCREEN RATIO: NORMAL
PROTHROMBIN TIME: 12.6 S (ref 12–15.5)
SCREEN APTT/NORMAL: 1.05
SCREEN APTT/NORMAL: NORMAL
SCREEN DRVVT/NORMAL: 1.07 %
THROMBIN TIME: NORMAL S

## 2024-10-11 PROCEDURE — 99999 PR PBB SHADOW E&M-EST. PATIENT-LVL III: CPT | Mod: PBBFAC,,, | Performed by: INTERNAL MEDICINE

## 2024-10-11 RX ORDER — CLOPIDOGREL BISULFATE 75 MG/1
75 TABLET ORAL DAILY
Qty: 90 TABLET | Refills: 1 | Status: SHIPPED | OUTPATIENT
Start: 2024-10-11

## 2024-10-11 NOTE — PROGRESS NOTES
Subjective:    Patient ID:  Ruma Nguyen is a 53 y.o. female who presents for Follow-up, Coronary Artery Disease, and Peripheral Arterial Disease        HPI    RECENT LABS NOTED CMP CRP AND SED RATE OK, STATUS POST PTA OF THE LEFT LOWER EXTREMITY DID WELL, WALKING MUCH BETTER, STILL SMOKES, NO ANGINA NO SIGNIFICANT CLAUDICATION NO TIA TYPE SYMPTOMS NO NEAR-SYNCOPE, SEE ROS  Past Medical History:   Diagnosis Date    Anticoagulant long-term use     Antiphospholipid syndrome     Cancer     skin, melanoma    Colon polyps     Coronary artery disease     Heart murmur     Hyperlipidemia     Hypertension     PVD (peripheral vascular disease) with claudication 2023    RA (rheumatoid arthritis)     Radiculopathy     lumbar    Rheumatoid arthritis of multiple sites without rheumatoid factor     Thyroid nodule 2023     Past Surgical History:   Procedure Laterality Date    ADENOIDECTOMY      ANGIOGRAM, ABDOMINAL AORTA W/ EXTREMITY RUNOFF  2024    Procedure: Abdominal w/Runoff;  Surgeon: Benjamin Bernal MD;  Location: [facility] CATH;  Service: Cardiology;;    ANGIOGRAM, CORONARY, WITH LEFT HEART CATHETERIZATION  2023    Procedure: Left Heart Cath;  Surgeon: Benjamin Bernal MD;  Location: [facility] C[facility];  Service: Cardiology;;    BREAST SURGERY  2006    Augmentation     SECTION      COLONOSCOPY N/A 2020    Procedure: COLONOSCOPY;  Surgeon: Lalo De Leon MD;  Location: [facility] ENDO;  Service: Endoscopy;  Laterality: N/A;    CORONARY ANGIOGRAPHY  2023    Procedure: -;  Surgeon: Benjamin Bernal MD;  Location: [facility] CATH;  Service: Cardiology;;    HERNIA REPAIR      x 3    PERCUTANEOUS CORONARY INTERVENTION, ARTERY  2023    Procedure: RED LCX;  Surgeon: Benjamin Bernal MD;  Location: [facility] C[facility];  Service: Cardiology;;    POLYPECTOMY      STENT, DRUG ELUTING, SINGLE VESSEL, CORONARY  2023    Procedure: -;  Surgeon: Benjamin Bernal MD;  Location: [facility] CATH;  Service: Cardiology;;    STENT, ILIAC  ARTERY  7/23/2024    Procedure: PTA / Stent Lt. Iliac Artery;  Surgeon: Benjamin Bernal MD;  Location: [facility] CATH;  Service: Cardiology;;    THYROIDECTOMY Left 12/27/2023    Procedure: THYROIDECTOMY - LEFT LOBE THYROIDECTOMY;  Surgeon: Pravin Mujica MD;  Location: [facility] OR;  Service: General;  Laterality: Left;    TUBAL LIGATION Bilateral 1995     Family History   Problem Relation Name Age of Onset    Heart disease Mother Carole ayala     Hypertension Mother Carole ayala     Asthma Mother Carole ayala     Cancer Father Wayne Ayala     Colon cancer Father Wayne Ayala     No Known Problems Sister      Diabetes Brother Chin ayala     Kidney cancer Brother Chin ayala     Arthritis Maternal Grandmother Lu Kaur     Diabetes Son Bulmaro jenkins      Social History     Socioeconomic History    Marital status:    Tobacco Use    Smoking status: Every Day     Current packs/day: 0.75     Average packs/day: 0.8 packs/day for 38.0 years (28.5 ttl pk-yrs)     Types: Cigarettes    Smokeless tobacco: Never    Tobacco comments:     Needs help to quit; had problems getting chantix   Substance and Sexual Activity    Alcohol use: No    Drug use: No    Sexual activity: Yes     Partners: Male     Social Drivers of Health     Financial Resource Strain: Low Risk  (6/3/2024)    Overall Financial Resource Strain (CARDIA)     Difficulty of Paying Living Expenses: Not hard at all   Food Insecurity: No Food Insecurity (6/3/2024)    Hunger Vital Sign     Worried About Running Out of Food in the Last Year: Never true     Ran Out of Food in the Last Year: Never true   Transportation Needs: No Transportation Needs (12/28/2023)    PRAPARE - Transportation     Lack of Transportation (Medical): No     Lack of Transportation (Non-Medical): No   Physical Activity: Sufficiently Active (6/3/2024)    Exercise Vital Sign     Days of Exercise per Week: 7 days     Minutes of Exercise per Session: 60 min   Stress: Stress Concern  Present (6/3/2024)    [facility] [employer] [facility] - Occupational Stress Questionnaire     Feeling of Stress : Very much   Housing Stability: Low Risk  (12/28/2023)    Housing Stability Vital Sign     Unable to Pay for Housing in the Last Year: No     Number of Places Lived in the Last Year: 1     Unstable Housing in the Last Year: No       Review of patient's allergies indicates:  No Known Allergies    Current Outpatient Medications:     albuterol-ipratropium (DUO-NEB) 2.5 mg-0.5 mg/3 mL nebulizer solution, Take 3 mLs by nebulization as needed., Disp: , Rfl:     amLODIPine (NORVASC) 5 MG tablet, Take 1 tablet (5 mg total) by mouth every evening., Disp: 90 tablet, Rfl: 1    aspirin (ECOTRIN) 81 MG EC tablet, Take 81 mg by mouth every evening., Disp: , Rfl:     clonazePAM (KLONOPIN) 1 MG tablet, Take 1 tablet (1 mg total) by mouth daily as needed for Anxiety. (Patient taking differently: Take 1 mg by mouth 2 (two) times daily as needed for Anxiety.), Disp: 30 tablet, Rfl: 1    fluticasone propionate (FLONASE) 50 mcg/actuation nasal spray, 1 spray (50 mcg total) by Each Nostril route 2 (two) times a day., Disp: 16 g, Rfl: 11    folic acid (FOLVITE) 1 MG tablet, Take 1 tablet (1 mg total) by mouth every evening., Disp: 90 tablet, Rfl: 3    methotrexate 25 mg/mL injection, INJECT 0.6 MLS (15 MG TOTAL) INTO THE MUSCLE EVERY 7 DAYS., Disp: 6 mL, Rfl: 1    mirtazapine (REMERON) 7.5 MG Tab, Take 7.5 mg by mouth every evening., Disp: , Rfl:     multivitamin with minerals tablet, Take 1 tablet by mouth once daily., Disp: , Rfl:     naloxone (NARCAN) 4 mg/actuation Spry, TAKE 2 SPRAYS BY NASAL ROUTE. AS DIRECTED, Disp: , Rfl:     nitroGLYCERIN (NITROSTAT) 0.4 MG SL tablet, Place 1 tablet (0.4 mg total) under the tongue every 5 (five) minutes as needed for Chest pain., Disp: 25 tablet, Rfl: 0    ondansetron (ZOFRAN-ODT) 8 MG TbDL, Take 1 tablet (8 mg total) by mouth every 6 (six) hours as needed (nausea)., Disp: 20  "tablet, Rfl: 1    rosuvastatin (CRESTOR) 40 MG Tab, Take 1 tablet (40 mg total) by mouth every evening., Disp: 90 tablet, Rfl: 1    sertraline (ZOLOFT) 100 MG tablet, Take 100 mg by mouth once daily., Disp: , Rfl:     clopidogreL (PLAVIX) 75 mg tablet, Take 1 tablet (75 mg total) by mouth once daily., Disp: 90 tablet, Rfl: 1    Review of Systems   Constitutional: Negative for decreased appetite, fever, weight gain and weight loss.   HENT:  Positive for hearing loss (SINCE CHILDHOOD). Negative for congestion and sore throat.    Eyes:  Negative for blurred vision and redness.   Cardiovascular:  Negative for chest pain, claudication, cyanosis, dyspnea on exertion, irregular heartbeat, leg swelling (ANKLES), near-syncope, orthopnea (no PND), palpitations, paroxysmal nocturnal dyspnea and syncope.   Respiratory:  Negative for cough, hemoptysis and wheezing.    Endocrine: Negative for polydipsia and polyuria.   Hematologic/Lymphatic: Negative for adenopathy. Bruises/bleeds easily: OCC.   Skin: Negative.    Musculoskeletal:  Negative for back pain, joint pain, muscle cramps and myalgias.   Gastrointestinal:  Negative for abdominal pain, dysphagia, melena, nausea and vomiting.   Genitourinary:  Negative for dysuria and flank pain.   Neurological:  Negative for dizziness, focal weakness, light-headedness, loss of balance and paresthesias (NEUROPATHY).   Psychiatric/Behavioral:  Negative for altered mental status and depression.    Allergic/Immunologic: Negative for hives and persistent infections.        Objective:      Vitals:    10/11/24 1136   BP: 118/72   Pulse: 64   Weight: 71.7 kg (158 lb 1.1 oz)   Height: 5' 6" (1.676 m)     Body mass index is 25.51 kg/m².    Physical Exam  Constitutional:       Appearance: Normal appearance.   HENT:      Head: Normocephalic and atraumatic.   Eyes:      Extraocular Movements: Extraocular movements intact.      Conjunctiva/sclera: Conjunctivae normal.   Neck:      Vascular: Carotid " bruit present.   Cardiovascular:      Rate and Rhythm: Normal rate. No extrasystoles are present.     Pulses:           Carotid pulses are 2+ on the right side with bruit and 2+ on the left side with bruit.       Radial pulses are 2+ on the right side and 2+ on the left side.        Dorsalis pedis pulses are 2+ on the right side and 2+ on the left side.        Posterior tibial pulses are 2+ on the right side and 2+ on the left side.      Heart sounds: Murmur heard.      Systolic murmur is present with a grade of 1/6.      Diastolic murmur is present with a grade of 1/4 at the upper right sternal border.      No friction rub. No gallop.   Pulmonary:      Effort: Pulmonary effort is normal.      Breath sounds: Normal breath sounds and air entry.   Abdominal:      Palpations: Abdomen is soft.      Tenderness: There is no abdominal tenderness.   Musculoskeletal:      Cervical back: Neck supple.      Right lower leg: No edema.      Left lower leg: No edema.   Skin:     Capillary Refill: Capillary refill takes less than 2 seconds.   Neurological:      General: No focal deficit present.      Mental Status: She is alert and oriented to person, place, and time.      Cranial Nerves: Cranial nerve deficit ([place]) present.   Psychiatric:         Mood and Affect: Mood normal.         Speech: Speech normal.         Behavior: Behavior normal.                 ..    Chemistry        Component Value Date/Time     10/08/2024 1205     04/11/2019 1152    K 3.7 10/08/2024 1205     10/08/2024 1205     04/11/2019 1152    CO2 26 10/08/2024 1205    BUN 9 10/08/2024 1205    CREATININE 0.8 10/08/2024 1205    CREATININE 0.71 04/11/2019 1152    GLU 94 10/08/2024 1205    GLU 96 04/11/2019 1152        Component Value Date/Time    CALCIUM 9.8 10/08/2024 1205    ALKPHOS 71 10/08/2024 1205    AST 24 10/08/2024 1205    AST 18 01/27/2016 0915    ALT 26 10/08/2024 1205    BILITOT 0.2 10/08/2024 1205    ESTGFRAFRICA >60 06/05/2020  1457    EGFRNONAA >60 06/05/2020 1457            ..  Lab Results   Component Value Date    CHOL 93 (L) 08/31/2023    CHOL 192 05/01/2023    CHOL 150 01/27/2016     Lab Results   Component Value Date    HDL 27 (L) 08/31/2023    HDL 32 (L) 05/01/2023    HDL 31 (L) 01/27/2016     Lab Results   Component Value Date    LDLCALC 46.4 (L) 08/31/2023    LDLCALC 123.4 05/01/2023    LDLCALC 72.4 01/27/2016     Lab Results   Component Value Date    TRIG 98 08/31/2023    TRIG 183 (H) 05/01/2023    TRIG 233 (H) 01/27/2016     Lab Results   Component Value Date    CHOLHDL 29.0 08/31/2023    CHOLHDL 16.7 (L) 05/01/2023    CHOLHDL 20.7 01/27/2016     ..  Lab Results   Component Value Date    WBC 6.17 10/08/2024    HGB 14.3 10/08/2024    HCT 40.9 10/08/2024     (H) 10/08/2024     10/08/2024       Test(s) Reviewed  I have reviewed the following in detail:  [] Stress test   [x] Angiography   [] Echocardiogram   [x] Labs   [] Other:       Assessment:         ICD-10-CM ICD-9-CM   1. Coronary artery disease involving native coronary artery of native heart without angina pectoris  I25.10 414.01   2. Tobacco abuse counseling  Z71.6 V65.42     305.1   3. PVD (peripheral vascular disease)  I73.9 443.9   4. Atherosclerosis of abdominal aorta  I70.0 440.0   5. Mild mitral and aortic regurgitation  I08.0 396.3   6. Long term (current) use of antithrombotics/antiplatelets  Z79.02 V58.63   7. Mixed dyslipidemia  E78.2 272.2   8. Mild carotid artery disease  I77.9 447.9     Problem List Items Addressed This Visit          Cardiac/Vascular    Mixed dyslipidemia    Relevant Orders    Lipid Panel    Atherosclerosis of abdominal aorta    Mild mitral and aortic regurgitation    Coronary artery disease involving native coronary artery of native heart without angina pectoris - Primary    Relevant Medications    clopidogreL (PLAVIX) 75 mg tablet    Other Relevant Orders    Comprehensive Metabolic Panel    Hemoglobin    Lipid Panel     Magnesium    Mild carotid artery disease    Relevant Medications    clopidogreL (PLAVIX) 75 mg tablet    PVD (peripheral vascular disease)       Hematology    Long term (current) use of antithrombotics/antiplatelets    Relevant Orders    Hemoglobin       Other    Tobacco abuse counseling        Plan:     TOBACCO CESSATION EXTENSIVE COUNSELING, WALKING EXERCISE PROGRAM, ALL CV CLINICALLY STABLE, NO ANGINA, NO HF, NO TIA, NO CLINICAL ARRHYTHMIA,CONTINUE CURRENT MEDS, EDUCATION, DIET, EXERCISE RETURN TO CLINIC IN 6 MONTHS WITH LABS, DISCUSSED PLAN WITH THE PATIENT HER DAUGHTER ,       Coronary artery disease involving native coronary artery of native heart without angina pectoris  -     Comprehensive Metabolic Panel; Future; Expected date: 10/11/2024  -     Hemoglobin; Future; Expected date: 04/11/2025  -     Lipid Panel; Future; Expected date: 10/11/2024  -     Magnesium; Future; Expected date: 10/11/2024  -     clopidogreL (PLAVIX) 75 mg tablet; Take 1 tablet (75 mg total) by mouth once daily.  Dispense: 90 tablet; Refill: 1    Tobacco abuse counseling    PVD (peripheral vascular disease)  Comments:  STABLE    Atherosclerosis of abdominal aorta    Mild mitral and aortic regurgitation    Long term (current) use of antithrombotics/antiplatelets  -     Hemoglobin; Future; Expected date: 04/11/2025    Mixed dyslipidemia  -     Lipid Panel; Future; Expected date: 10/11/2024    Mild carotid artery disease  -     clopidogreL (PLAVIX) 75 mg tablet; Take 1 tablet (75 mg total) by mouth once daily.  Dispense: 90 tablet; Refill: 1    RTC Low level/low impact aerobic exercise 5x's/wk. Heart healthy diet and risk factor modification.    See labs and med orders.    Aerobic exercise 5x's/wk. Heart healthy diet and risk factor modification.    See labs and med orders.      ALL CV CLINICALLY STABLE, NO ANGINA, NO HF, NO TIA, NO CLINICAL ARRHYTHMIA,CONTINUE CURRENT MEDS, EDUCATION, DIET, EXERCISE

## 2024-10-13 LAB
B2 GLYCOPROT1 IGA SER QL: 16 U/ML
B2 GLYCOPROT1 IGG SER QL: 1.7 U/ML
B2 GLYCOPROT1 IGM SER QL: 62 U/ML
CARDIOLIPIN IGG SER IA-ACNC: <9.4 GPL (ref 0–14.99)
CARDIOLIPIN IGM SER IA-ACNC: 37 MPL (ref 0–12.49)

## 2024-11-26 ENCOUNTER — TELEPHONE (OUTPATIENT)
Dept: CARDIOLOGY | Facility: CLINIC | Age: 54
End: 2024-11-26
Payer: COMMERCIAL

## 2024-11-26 NOTE — TELEPHONE ENCOUNTER
Cardiac clearance for colonoscopy. Anesthesia type not listed. Pt taking ASA and Plavix.   Implant: stent    [facility]   Fax: [phone]

## 2024-11-28 DIAGNOSIS — D68.61 ANTIPHOSPHOLIPID SYNDROME: ICD-10-CM

## 2024-11-28 DIAGNOSIS — M06.09 RHEUMATOID ARTHRITIS OF MULTIPLE SITES WITH NEGATIVE RHEUMATOID FACTOR: ICD-10-CM

## 2024-11-29 RX ORDER — METHOTREXATE 25 MG/ML
15 INJECTION, SOLUTION INTRA-ARTERIAL; INTRAMUSCULAR; INTRAVENOUS
Qty: 4 ML | Refills: 1 | Status: SHIPPED | OUTPATIENT
Start: 2024-11-29

## 2025-01-01 DIAGNOSIS — D68.61 ANTIPHOSPHOLIPID SYNDROME: ICD-10-CM

## 2025-01-01 DIAGNOSIS — M06.09 RHEUMATOID ARTHRITIS OF MULTIPLE SITES WITH NEGATIVE RHEUMATOID FACTOR: ICD-10-CM

## 2025-01-02 RX ORDER — METHOTREXATE 25 MG/ML
15 INJECTION, SOLUTION INTRAMUSCULAR; INTRATHECAL; INTRAVENOUS; SUBCUTANEOUS
Qty: 8 ML | Refills: 0 | Status: SHIPPED | OUTPATIENT
Start: 2025-01-02

## 2025-01-06 ENCOUNTER — LAB VISIT (OUTPATIENT)
Dept: LAB | Facility: HOSPITAL | Age: 55
End: 2025-01-06
Attending: INTERNAL MEDICINE
Payer: COMMERCIAL

## 2025-01-06 DIAGNOSIS — D68.61 ANTIPHOSPHOLIPID SYNDROME: ICD-10-CM

## 2025-01-06 DIAGNOSIS — M06.09 RHEUMATOID ARTHRITIS OF MULTIPLE SITES WITH NEGATIVE RHEUMATOID FACTOR: ICD-10-CM

## 2025-01-06 LAB
ALBUMIN SERPL BCP-MCNC: 4.3 G/DL (ref 3.5–5.2)
ALP SERPL-CCNC: 78 U/L (ref 40–150)
ALT SERPL W/O P-5'-P-CCNC: 21 U/L (ref 10–44)
ANION GAP SERPL CALC-SCNC: 13 MMOL/L (ref 8–16)
AST SERPL-CCNC: 27 U/L (ref 10–40)
BASOPHILS # BLD AUTO: 0.07 K/UL (ref 0–0.2)
BASOPHILS NFR BLD: 1.2 % (ref 0–1.9)
BILIRUB SERPL-MCNC: 0.4 MG/DL (ref 0.1–1)
BUN SERPL-MCNC: 10 MG/DL (ref 6–20)
CALCIUM SERPL-MCNC: 10 MG/DL (ref 8.7–10.5)
CHLORIDE SERPL-SCNC: 106 MMOL/L (ref 95–110)
CO2 SERPL-SCNC: 26 MMOL/L (ref 23–29)
CREAT SERPL-MCNC: 0.8 MG/DL (ref 0.5–1.4)
CRP SERPL-MCNC: 5.4 MG/L (ref 0–8.2)
DIFFERENTIAL METHOD BLD: ABNORMAL
EOSINOPHIL # BLD AUTO: 0.2 K/UL (ref 0–0.5)
EOSINOPHIL NFR BLD: 3 % (ref 0–8)
ERYTHROCYTE [DISTWIDTH] IN BLOOD BY AUTOMATED COUNT: 13.4 % (ref 11.5–14.5)
ERYTHROCYTE [SEDIMENTATION RATE] IN BLOOD BY PHOTOMETRIC METHOD: 15 MM/HR (ref 0–36)
EST. GFR  (NO RACE VARIABLE): >60 ML/MIN/1.73 M^2
GLUCOSE SERPL-MCNC: 105 MG/DL (ref 70–110)
HCT VFR BLD AUTO: 42.6 % (ref 37–48.5)
HGB BLD-MCNC: 14.9 G/DL (ref 12–16)
IMM GRANULOCYTES # BLD AUTO: 0.01 K/UL (ref 0–0.04)
IMM GRANULOCYTES NFR BLD AUTO: 0.2 % (ref 0–0.5)
LYMPHOCYTES # BLD AUTO: 2.3 K/UL (ref 1–4.8)
LYMPHOCYTES NFR BLD: 38.1 % (ref 18–48)
MCH RBC QN AUTO: 35.1 PG (ref 27–31)
MCHC RBC AUTO-ENTMCNC: 35 G/DL (ref 32–36)
MCV RBC AUTO: 101 FL (ref 82–98)
MONOCYTES # BLD AUTO: 0.2 K/UL (ref 0.3–1)
MONOCYTES NFR BLD: 4 % (ref 4–15)
NEUTROPHILS # BLD AUTO: 3.3 K/UL (ref 1.8–7.7)
NEUTROPHILS NFR BLD: 53.5 % (ref 38–73)
NRBC BLD-RTO: 0 /100 WBC
PLATELET # BLD AUTO: 260 K/UL (ref 150–450)
PMV BLD AUTO: 9.4 FL (ref 9.2–12.9)
POTASSIUM SERPL-SCNC: 4.1 MMOL/L (ref 3.5–5.1)
PROT SERPL-MCNC: 8 G/DL (ref 6–8.4)
RBC # BLD AUTO: 4.24 M/UL (ref 4–5.4)
SODIUM SERPL-SCNC: 145 MMOL/L (ref 136–145)
WBC # BLD AUTO: 6.06 K/UL (ref 3.9–12.7)

## 2025-01-06 PROCEDURE — 85652 RBC SED RATE AUTOMATED: CPT | Performed by: INTERNAL MEDICINE

## 2025-01-06 PROCEDURE — 36415 COLL VENOUS BLD VENIPUNCTURE: CPT | Mod: PO | Performed by: INTERNAL MEDICINE

## 2025-01-06 PROCEDURE — 86140 C-REACTIVE PROTEIN: CPT | Performed by: INTERNAL MEDICINE

## 2025-01-06 PROCEDURE — 80053 COMPREHEN METABOLIC PANEL: CPT | Mod: PO | Performed by: INTERNAL MEDICINE

## 2025-01-06 PROCEDURE — 85025 COMPLETE CBC W/AUTO DIFF WBC: CPT | Mod: PO | Performed by: INTERNAL MEDICINE

## 2025-01-09 ENCOUNTER — OFFICE VISIT (OUTPATIENT)
Dept: RHEUMATOLOGY | Facility: CLINIC | Age: 55
End: 2025-01-09
Payer: COMMERCIAL

## 2025-01-09 ENCOUNTER — PATIENT MESSAGE (OUTPATIENT)
Dept: RHEUMATOLOGY | Facility: CLINIC | Age: 55
End: 2025-01-09

## 2025-01-09 DIAGNOSIS — M54.12 CERVICAL RADICULOPATHY AT C6: ICD-10-CM

## 2025-01-09 DIAGNOSIS — Z51.81 ENCOUNTER FOR MEDICATION MONITORING: ICD-10-CM

## 2025-01-09 DIAGNOSIS — Z79.899 DRUG-INDUCED IMMUNODEFICIENCY: ICD-10-CM

## 2025-01-09 DIAGNOSIS — M06.09 RHEUMATOID ARTHRITIS OF MULTIPLE SITES WITH NEGATIVE RHEUMATOID FACTOR: Primary | ICD-10-CM

## 2025-01-09 DIAGNOSIS — D68.61 ANTIPHOSPHOLIPID SYNDROME: ICD-10-CM

## 2025-01-09 DIAGNOSIS — D84.821 DRUG-INDUCED IMMUNODEFICIENCY: ICD-10-CM

## 2025-01-09 RX ORDER — METHOTREXATE 25 MG/ML
15 INJECTION, SOLUTION INTRAMUSCULAR; INTRATHECAL; INTRAVENOUS; SUBCUTANEOUS
Qty: 8 ML | Refills: 1 | Status: SHIPPED | OUTPATIENT
Start: 2025-01-09

## 2025-01-09 RX ORDER — FOLIC ACID 1 MG/1
1 TABLET ORAL NIGHTLY
Qty: 90 TABLET | Refills: 3 | Status: SHIPPED | OUTPATIENT
Start: 2025-01-09

## 2025-01-09 NOTE — PROGRESS NOTES
RHEUMATOLOGY FOLLOW UP - TELE VISIT     The patient location is: [place]  The chief complaint leading to consultation is: Follow-up for rheumatoid arthritis  Visit type: Virtual visit with synchronous audio and video  Total time spent with patient:  10 minutes  Each patient to whom he or she provides medical services by telemedicine is:  (1) informed of the relationship between the physician and patient and the respective role of any other health care provider with respect to management of the patient; and (2) notified that he or she may decline to receive medical services by telemedicine and may withdraw from such care at any time.    Chief complaints, HPI, ROS, EXAM, Assessment & Plans:-  Ruma Sanchez a 54 y.o. pleasant female seen today for follow-up visit.  Seronegative rheumatoid arthritis and antiphospholipid antibody syndrome managed by external rheumatologist for several years recently established care with me.  Restarted on methotrexate.  Doing well today.  Reports significant improvement of joint pain, swelling and stiffness since restarting methotrexate.  Severe peripheral arterial disease status post surgical treatment.  Recovered well..   Rheumatological review of system negative otherwise.  100% fist formation bilateral hands..    1. Rheumatoid arthritis of multiple sites with negative rheumatoid factor    2. Antiphospholipid syndrome    3. Drug-induced immunodeficiency    4. Cervical radiculopathy at C6    5. Encounter for medication monitoring    6. Fibromyalgia    7. Chronic pain syndrome      Problem List Items Addressed This Visit       Antiphospholipid syndrome    Cervical radiculopathy at C6    Chronic pain syndrome    Drug-induced immunodeficiency    Encounter for medication monitoring    Fibromyalgia    Rheumatoid arthritis of multiple sites with negative rheumatoid factor - Primary      Latest Reference Range & Units 01/06/25 13:00   WBC 3.90 - 12.70 K/uL 6.06   RBC 4.00 - 5.40 M/uL 4.24    Hemoglobin 12.0 - 16.0 g/dL 14.9   Hematocrit 37.0 - 48.5 % 42.6   MCV 82 - 98 fL 101 (H)   MCH 27.0 - 31.0 pg 35.1 (H)   MCHC 32.0 - 36.0 g/dL 35.0   RDW 11.5 - 14.5 % 13.4   Platelet Count 150 - 450 K/uL 260   MPV 9.2 - 12.9 fL 9.4   Gran % 38.0 - 73.0 % 53.5   Lymph % 18.0 - 48.0 % 38.1   Mono % 4.0 - 15.0 % 4.0   Eos % 0.0 - 8.0 % 3.0   Basophil % 0.0 - 1.9 % 1.2   Immature Granulocytes 0.0 - 0.5 % 0.2   Gran # (ANC) 1.8 - 7.7 K/uL 3.3   Lymph # 1.0 - 4.8 K/uL 2.3   Mono # 0.3 - 1.0 K/uL 0.2 (L)   Eos # 0.0 - 0.5 K/uL 0.2   Baso # 0.00 - 0.20 K/uL 0.07   Immature Grans (Abs) 0.00 - 0.04 K/uL 0.01   nRBC 0 /100 WBC 0   Differential Method  Automated   Sed Rate 0 - 36 mm/Hr 15   Sodium 136 - 145 mmol/L 145   Potassium 3.5 - 5.1 mmol/L 4.1   Chloride 95 - 110 mmol/L 106   CO2 23 - 29 mmol/L 26   Anion Gap 8 - 16 mmol/L 13   BUN 6 - 20 mg/dL 10   Creatinine 0.5 - 1.4 mg/dL 0.8   eGFR >60 mL/min/1.73 m^2 >60   Glucose 70 - 110 mg/dL 105   Calcium 8.7 - 10.5 mg/dL 10.0   ALP 40 - 150 U/L 78   PROTEIN TOTAL 6.0 - 8.4 g/dL 8.0   Albumin 3.5 - 5.2 g/dL 4.3   BILIRUBIN TOTAL 0.1 - 1.0 mg/dL 0.4   AST 10 - 40 U/L 27   ALT 10 - 44 U/L 21   CRP 0.0 - 8.2 mg/L 5.4   (H): Data is abnormally high  (L): Data is abnormally low      Significant improvement of seronegative rheumatoid and antiphospholipid antibody syndrome associated inflammatory arthritis on methotrexate.  Continue weekly methotrexate injectable medication with folic acid daily.  Hold methotrexate any infection.  Safety labs every visit.  Continue follow-up with vascular surgeon for severe peripheral arterial disease.  No Raynaud's.  Drug induced immunodeficiency due to use of immunosuppressive drugs. Monitor carefully for infections. Advised to get immediate medical care if any infection. Also advised strict adherence to age appropriate vaccinations and cancer screenings with PCP.  I have explained all of the above in detail and the patient understands all of  the current recommendation(s). I have answered all questions to the best of my ability and to their complete satisfaction.   # Follow up in about 8 months (around 2025).      Past Medical History:   Diagnosis Date    Anticoagulant long-term use     Antiphospholipid syndrome     Cancer     skin, melanoma    Colon polyps     Coronary artery disease     Heart murmur     Hyperlipidemia     Hypertension     PVD (peripheral vascular disease) with claudication 2023    RA (rheumatoid arthritis)     Radiculopathy     lumbar    Rheumatoid arthritis of multiple sites without rheumatoid factor     Thyroid nodule 2023       Past Surgical History:   Procedure Laterality Date    ADENOIDECTOMY      ANGIOGRAM, ABDOMINAL AORTA W/ EXTREMITY RUNOFF  2024    Procedure: Abdominal w/Runoff;  Surgeon: Benjamin Bernal MD;  [facility];  Service: Cardiology;;    ANGIOGRAM, CORONARY, WITH LEFT HEART CATHETERIZATION  2023    Procedure: Left Heart Cath;  Surgeon: Benjamin Bernal MD;  Location: [facility] CATH;  Service: Cardiology;;    BREAST SURGERY  2006    Augmentation     SECTION      COLONOSCOPY N/A 2020    Procedure: COLONOSCOPY;  Surgeon: Lalo De Leon MD;  Location: [facility] ENDO;  Service: Endoscopy;  Laterality: N/A;    CORONARY ANGIOGRAPHY  2023    Procedure: -;  Surgeon: Benjamin Bernal MD;  [facility];  Service: Cardiology;;    HERNIA REPAIR      x 3    PERCUTANEOUS CORONARY INTERVENTION, ARTERY  2023    Procedure: RED LCX;  Surgeon: Benjamin Bernal MD;  [facility];  Service: Cardiology;;    POLYPECTOMY      STENT, DRUG ELUTING, SINGLE VESSEL, CORONARY  2023    Procedure: -;  Surgeon: Benjamin Bernal MD;  Location: [facility] CATH;  Service: Cardiology;;    STENT, ILIAC ARTERY  2024    Procedure: PTA / Stent Lt. Iliac Artery;  Surgeon: Benjamin Bernal MD;  Location: [facility] CATH;  Service: Cardiology;;    THYROIDECTOMY Left 2023    Procedure: THYROIDECTOMY - LEFT  LOBE THYROIDECTOMY;  Surgeon: Pravin Mujica MD;  [facility];  Service: General;  Laterality: Left;    TUBAL LIGATION Bilateral 1995        Social History     Tobacco Use    Smoking status: Every Day     Current packs/day: 0.75     Average packs/day: 0.8 packs/day for 38.0 years (28.5 ttl pk-yrs)     Types: Cigarettes    Smokeless tobacco: Never    Tobacco comments:     Needs help to quit; had problems getting chantix   Substance Use Topics    Alcohol use: No    Drug use: No       Family History   Problem Relation Name Age of Onset    Heart disease Mother Carole alejandre     Hypertension Mother Carole alejandre     Asthma Mother Carole alejandre     Cancer Father Wayne Alejandre     Colon cancer Father Wayne Alejandre     No Known Problems Sister      Diabetes Brother Chin alejandre     Kidney cancer Brother Chin alejandre     Arthritis Maternal Grandmother Lu Kaur     Diabetes Son Bulmaro jenkins        Review of patient's allergies indicates:  No Known Allergies    Medication List with Changes/Refills   Current Medications    ALBUTEROL-IPRATROPIUM (DUO-NEB) 2.5 MG-0.5 MG/3 ML NEBULIZER SOLUTION    Take 3 mLs by nebulization as needed.    AMLODIPINE (NORVASC) 5 MG TABLET    Take 1 tablet (5 mg total) by mouth every evening.    ASPIRIN (ECOTRIN) 81 MG EC TABLET    Take 81 mg by mouth every evening.    CLONAZEPAM (KLONOPIN) 1 MG TABLET    Take 1 tablet (1 mg total) by mouth daily as needed for Anxiety.    CLOPIDOGREL (PLAVIX) 75 MG TABLET    Take 1 tablet (75 mg total) by mouth once daily.    FOLIC ACID (FOLVITE) 1 MG TABLET    Take 1 tablet (1 mg total) by mouth every evening.    METHOTREXATE 25 MG/ML INJECTION    INJECT 0.6 MLS (15 MG TOTAL) INTO THE SKIN EVERY 7 DAYS.    MIRTAZAPINE (REMERON) 7.5 MG TAB    Take 7.5 mg by mouth every evening.    MULTIVITAMIN WITH MINERALS TABLET    Take 1 tablet by mouth once daily.    NALOXONE (NARCAN) 4 MG/ACTUATION SPRY    TAKE 2 SPRAYS BY NASAL ROUTE. AS DIRECTED     NITROGLYCERIN (NITROSTAT) 0.4 MG SL TABLET    Place 1 tablet (0.4 mg total) under the tongue every 5 (five) minutes as needed for Chest pain.    ONDANSETRON (ZOFRAN-ODT) 8 MG TBDL    Take 1 tablet (8 mg total) by mouth every 6 (six) hours as needed (nausea).    ROSUVASTATIN (CRESTOR) 40 MG TAB    Take 1 tablet (40 mg total) by mouth every evening.    SERTRALINE (ZOLOFT) 100 MG TABLET    Take 100 mg by mouth once daily.       Disclaimer: This note was prepared using voice recognition system and is likely to have sound alike errors and is not proof read.  Please call me with any questions.

## 2025-01-17 DIAGNOSIS — E78.2 MIXED DYSLIPIDEMIA: Chronic | ICD-10-CM

## 2025-01-17 RX ORDER — ROSUVASTATIN CALCIUM 40 MG/1
40 TABLET, COATED ORAL NIGHTLY
Qty: 90 TABLET | Refills: 0 | Status: SHIPPED | OUTPATIENT
Start: 2025-01-17

## 2025-03-23 DIAGNOSIS — D68.61 ANTIPHOSPHOLIPID SYNDROME: ICD-10-CM

## 2025-03-23 DIAGNOSIS — M06.09 RHEUMATOID ARTHRITIS OF MULTIPLE SITES WITH NEGATIVE RHEUMATOID FACTOR: ICD-10-CM

## 2025-03-24 RX ORDER — METHOTREXATE 25 MG/ML
15 INJECTION, SOLUTION INTRAMUSCULAR; INTRATHECAL; INTRAVENOUS; SUBCUTANEOUS
Qty: 6 ML | Refills: 1 | Status: SHIPPED | OUTPATIENT
Start: 2025-03-24

## 2025-04-14 ENCOUNTER — TELEPHONE (OUTPATIENT)
Dept: ENDOCRINOLOGY | Facility: CLINIC | Age: 55
End: 2025-04-14
Payer: COMMERCIAL

## 2025-04-14 DIAGNOSIS — C73 THYROID CANCER: Primary | ICD-10-CM

## 2025-04-14 NOTE — TELEPHONE ENCOUNTER
----- Message from Nurse Marion sent at 4/11/2025  4:09 PM CDT -----  Regarding: FW: appointment  Contact: patient  She has an appt with you on 7/30. Lab/US prior? No orders in.  ----- Message -----  From: Yoana Reyes  Sent: 4/11/2025   1:32 PM CDT  To: Saulo Whipple Staff (Endo)  Subject: appointment                                      Type:  Sooner Appointment RequestCaller is requesting a sooner appointment.  Caller declined first available appointment listed below.  Caller will not accept being placed on the waitlist and is requesting a message be sent to doctor.Name of Caller:  patientWhen is the first available appointment?  Symptoms:  follow up JulyWould the patient rather a call back or a response via [facility]? [facility] Call Back Number:  [phone] (home) Additional Information:  Please call patient to advise.  Thanks!

## 2025-04-22 ENCOUNTER — TELEPHONE (OUTPATIENT)
Dept: CARDIOLOGY | Facility: CLINIC | Age: 55
End: 2025-04-22
Payer: COMMERCIAL

## 2025-04-22 NOTE — TELEPHONE ENCOUNTER
Lvm for pt in regards to r/s appt on 5/2 due Dr. Bernal not being in clinic. Asked to call us back or reach out to us through the portal to discuss dates and times to r/s.

## 2025-04-23 ENCOUNTER — TELEPHONE (OUTPATIENT)
Dept: CARDIOLOGY | Facility: CLINIC | Age: 55
End: 2025-04-23
Payer: COMMERCIAL

## 2025-04-23 NOTE — TELEPHONE ENCOUNTER
Lvm for pt in regards to r/s appt on 5/2 due to Dr. Bernal not being in clinic. Asked to call us back or reach out to us through the portal to discuss dates and times to r/s.

## 2025-04-29 ENCOUNTER — LAB VISIT (OUTPATIENT)
Dept: LAB | Facility: HOSPITAL | Age: 55
End: 2025-04-29
Attending: INTERNAL MEDICINE
Payer: COMMERCIAL

## 2025-04-29 DIAGNOSIS — I25.10 CORONARY ARTERY DISEASE INVOLVING NATIVE CORONARY ARTERY OF NATIVE HEART WITHOUT ANGINA PECTORIS: Chronic | ICD-10-CM

## 2025-04-29 DIAGNOSIS — Z79.02 LONG TERM (CURRENT) USE OF ANTITHROMBOTICS/ANTIPLATELETS: Chronic | ICD-10-CM

## 2025-04-29 DIAGNOSIS — E78.2 MIXED DYSLIPIDEMIA: Chronic | ICD-10-CM

## 2025-04-29 LAB
ALBUMIN SERPL BCP-MCNC: 4 G/DL (ref 3.5–5.2)
ALP SERPL-CCNC: 74 UNIT/L (ref 40–150)
ALT SERPL W/O P-5'-P-CCNC: 18 UNIT/L (ref 10–44)
ANION GAP (OHS): 10 MMOL/L (ref 8–16)
AST SERPL-CCNC: 22 UNIT/L (ref 11–45)
BILIRUB SERPL-MCNC: 0.4 MG/DL (ref 0.1–1)
BUN SERPL-MCNC: 7 MG/DL (ref 6–20)
CALCIUM SERPL-MCNC: 9.4 MG/DL (ref 8.7–10.5)
CHLORIDE SERPL-SCNC: 110 MMOL/L (ref 95–110)
CHOLEST SERPL-MCNC: 121 MG/DL (ref 120–199)
CHOLEST/HDLC SERPL: 3.4 {RATIO} (ref 2–5)
CO2 SERPL-SCNC: 26 MMOL/L (ref 23–29)
CREAT SERPL-MCNC: 0.8 MG/DL (ref 0.5–1.4)
GFR SERPLBLD CREATININE-BSD FMLA CKD-EPI: >60 ML/MIN/1.73/M2
GLUCOSE SERPL-MCNC: 100 MG/DL (ref 70–110)
HDLC SERPL-MCNC: 36 MG/DL (ref 40–75)
HDLC SERPL: 29.8 % (ref 20–50)
HGB BLD-MCNC: 13.9 GM/DL (ref 12–16)
LDLC SERPL CALC-MCNC: 65 MG/DL (ref 63–159)
MAGNESIUM SERPL-MCNC: 1.9 MG/DL (ref 1.6–2.6)
NONHDLC SERPL-MCNC: 85 MG/DL
POTASSIUM SERPL-SCNC: 4.4 MMOL/L (ref 3.5–5.1)
PROT SERPL-MCNC: 7.4 GM/DL (ref 6–8.4)
SODIUM SERPL-SCNC: 146 MMOL/L (ref 136–145)
TRIGL SERPL-MCNC: 100 MG/DL (ref 30–150)

## 2025-04-29 PROCEDURE — 85018 HEMOGLOBIN: CPT

## 2025-04-29 PROCEDURE — 80053 COMPREHEN METABOLIC PANEL: CPT

## 2025-04-29 PROCEDURE — 83735 ASSAY OF MAGNESIUM: CPT

## 2025-04-29 PROCEDURE — 80061 LIPID PANEL: CPT

## 2025-04-29 PROCEDURE — 36415 COLL VENOUS BLD VENIPUNCTURE: CPT | Mod: PO

## 2025-05-06 ENCOUNTER — OFFICE VISIT (OUTPATIENT)
Dept: CARDIOLOGY | Facility: CLINIC | Age: 55
End: 2025-05-06
Payer: COMMERCIAL

## 2025-05-06 ENCOUNTER — RESULTS FOLLOW-UP (OUTPATIENT)
Dept: RHEUMATOLOGY | Facility: CLINIC | Age: 55
End: 2025-05-06

## 2025-05-06 ENCOUNTER — OFFICE VISIT (OUTPATIENT)
Dept: FAMILY MEDICINE | Facility: CLINIC | Age: 55
End: 2025-05-06
Payer: COMMERCIAL

## 2025-05-06 ENCOUNTER — LAB VISIT (OUTPATIENT)
Dept: LAB | Facility: HOSPITAL | Age: 55
End: 2025-05-06
Attending: INTERNAL MEDICINE
Payer: COMMERCIAL

## 2025-05-06 VITALS
BODY MASS INDEX: 27.17 KG/M2 | HEIGHT: 66 IN | DIASTOLIC BLOOD PRESSURE: 70 MMHG | WEIGHT: 168.56 LBS | HEART RATE: 59 BPM | OXYGEN SATURATION: 96 % | SYSTOLIC BLOOD PRESSURE: 126 MMHG | HEART RATE: 62 BPM | HEIGHT: 66 IN | DIASTOLIC BLOOD PRESSURE: 64 MMHG | TEMPERATURE: 98 F | BODY MASS INDEX: 27.09 KG/M2 | WEIGHT: 169.06 LBS | SYSTOLIC BLOOD PRESSURE: 132 MMHG

## 2025-05-06 DIAGNOSIS — D68.61 ANTIPHOSPHOLIPID SYNDROME: ICD-10-CM

## 2025-05-06 DIAGNOSIS — Z79.02 LONG TERM (CURRENT) USE OF ANTITHROMBOTICS/ANTIPLATELETS: Chronic | ICD-10-CM

## 2025-05-06 DIAGNOSIS — I10 HYPERTENSION, UNSPECIFIED TYPE: ICD-10-CM

## 2025-05-06 DIAGNOSIS — C73 THYROID CANCER: ICD-10-CM

## 2025-05-06 DIAGNOSIS — Z85.820 HISTORY OF MELANOMA: ICD-10-CM

## 2025-05-06 DIAGNOSIS — I08.0 MILD MITRAL AND AORTIC REGURGITATION: Chronic | ICD-10-CM

## 2025-05-06 DIAGNOSIS — M06.09 RHEUMATOID ARTHRITIS OF MULTIPLE SITES WITHOUT RHEUMATOID FACTOR: ICD-10-CM

## 2025-05-06 DIAGNOSIS — M06.09 RHEUMATOID ARTHRITIS OF MULTIPLE SITES WITH NEGATIVE RHEUMATOID FACTOR: ICD-10-CM

## 2025-05-06 DIAGNOSIS — Q05.9 SPINA BIFIDA, UNSPECIFIED HYDROCEPHALUS PRESENCE, UNSPECIFIED SPINAL REGION: ICD-10-CM

## 2025-05-06 DIAGNOSIS — F17.218 CIGARETTE NICOTINE DEPENDENCE WITH OTHER NICOTINE-INDUCED DISORDER: Primary | ICD-10-CM

## 2025-05-06 DIAGNOSIS — E78.5 HYPERLIPIDEMIA, UNSPECIFIED HYPERLIPIDEMIA TYPE: ICD-10-CM

## 2025-05-06 DIAGNOSIS — I77.9 MILD CAROTID ARTERY DISEASE: Chronic | ICD-10-CM

## 2025-05-06 DIAGNOSIS — Z79.01 ANTICOAGULANT LONG-TERM USE: ICD-10-CM

## 2025-05-06 DIAGNOSIS — E78.2 MIXED DYSLIPIDEMIA: ICD-10-CM

## 2025-05-06 DIAGNOSIS — I25.10 CORONARY ARTERY DISEASE INVOLVING NATIVE CORONARY ARTERY OF NATIVE HEART WITHOUT ANGINA PECTORIS: Primary | Chronic | ICD-10-CM

## 2025-05-06 DIAGNOSIS — Z71.6 TOBACCO ABUSE COUNSELING: Chronic | ICD-10-CM

## 2025-05-06 DIAGNOSIS — I70.0 ATHEROSCLEROSIS OF ABDOMINAL AORTA: Chronic | ICD-10-CM

## 2025-05-06 DIAGNOSIS — I73.9 PVD (PERIPHERAL VASCULAR DISEASE) WITH CLAUDICATION: Chronic | ICD-10-CM

## 2025-05-06 DIAGNOSIS — Z12.31 ENCOUNTER FOR SCREENING MAMMOGRAM FOR MALIGNANT NEOPLASM OF BREAST: ICD-10-CM

## 2025-05-06 PROBLEM — R09.89 BILATERAL CAROTID BRUITS: Status: RESOLVED | Noted: 2023-08-08 | Resolved: 2025-05-06

## 2025-05-06 LAB
ABSOLUTE EOSINOPHIL (OHS): 0.21 K/UL
ABSOLUTE MONOCYTE (OHS): 0.26 K/UL (ref 0.3–1)
ABSOLUTE NEUTROPHIL COUNT (OHS): 3.16 K/UL (ref 1.8–7.7)
ALBUMIN SERPL BCP-MCNC: 4.5 G/DL (ref 3.5–5.2)
ALP SERPL-CCNC: 85 UNIT/L (ref 40–150)
ALT SERPL W/O P-5'-P-CCNC: 25 UNIT/L (ref 10–44)
ANION GAP (OHS): 13 MMOL/L (ref 8–16)
AST SERPL-CCNC: 25 UNIT/L (ref 11–45)
BASOPHILS # BLD AUTO: 0.07 K/UL
BASOPHILS NFR BLD AUTO: 1.2 %
BILIRUB SERPL-MCNC: 0.5 MG/DL (ref 0.1–1)
BUN SERPL-MCNC: 8 MG/DL (ref 6–20)
C3 SERPL-MCNC: 126 MG/DL (ref 50–180)
C4 COMPLEMENT (OHS): 5 MG/DL (ref 11–44)
CALCIUM SERPL-MCNC: 9.5 MG/DL (ref 8.7–10.5)
CHLORIDE SERPL-SCNC: 107 MMOL/L (ref 95–110)
CO2 SERPL-SCNC: 24 MMOL/L (ref 23–29)
CREAT SERPL-MCNC: 0.8 MG/DL (ref 0.5–1.4)
CRP SERPL-MCNC: 1.4 MG/L
ERYTHROCYTE [DISTWIDTH] IN BLOOD BY AUTOMATED COUNT: 13.7 % (ref 11.5–14.5)
ERYTHROCYTE [SEDIMENTATION RATE] IN BLOOD BY PHOTOMETRIC METHOD: 6 MM/HR
GFR SERPLBLD CREATININE-BSD FMLA CKD-EPI: >60 ML/MIN/1.73/M2
GLUCOSE SERPL-MCNC: 101 MG/DL (ref 70–110)
HCT VFR BLD AUTO: 41.7 % (ref 37–48.5)
HGB BLD-MCNC: 14.4 GM/DL (ref 12–16)
IMM GRANULOCYTES # BLD AUTO: 0.01 K/UL (ref 0–0.04)
IMM GRANULOCYTES NFR BLD AUTO: 0.2 % (ref 0–0.5)
LYMPHOCYTES # BLD AUTO: 2 K/UL (ref 1–4.8)
MCH RBC QN AUTO: 34.9 PG (ref 27–31)
MCHC RBC AUTO-ENTMCNC: 34.5 G/DL (ref 32–36)
MCV RBC AUTO: 101 FL (ref 82–98)
NUCLEATED RBC (/100WBC) (OHS): 0 /100 WBC
PLATELET # BLD AUTO: 258 K/UL (ref 150–450)
PMV BLD AUTO: 9.7 FL (ref 9.2–12.9)
POTASSIUM SERPL-SCNC: 3.9 MMOL/L (ref 3.5–5.1)
PROT SERPL-MCNC: 8.2 GM/DL (ref 6–8.4)
RBC # BLD AUTO: 4.13 M/UL (ref 4–5.4)
RELATIVE EOSINOPHIL (OHS): 3.7 %
RELATIVE LYMPHOCYTE (OHS): 35 % (ref 18–48)
RELATIVE MONOCYTE (OHS): 4.6 % (ref 4–15)
RELATIVE NEUTROPHIL (OHS): 55.3 % (ref 38–73)
SODIUM SERPL-SCNC: 144 MMOL/L (ref 136–145)
TSH SERPL-ACNC: 3.31 UIU/ML (ref 0.4–4)
WBC # BLD AUTO: 5.71 K/UL (ref 3.9–12.7)

## 2025-05-06 PROCEDURE — 86140 C-REACTIVE PROTEIN: CPT

## 2025-05-06 PROCEDURE — 84450 TRANSFERASE (AST) (SGOT): CPT | Mod: PO

## 2025-05-06 PROCEDURE — 86147 CARDIOLIPIN ANTIBODY EA IG: CPT | Mod: 59,PO

## 2025-05-06 PROCEDURE — 86235 NUCLEAR ANTIGEN ANTIBODY: CPT | Mod: 59

## 2025-05-06 PROCEDURE — 86146 BETA-2 GLYCOPROTEIN ANTIBODY: CPT | Mod: PO

## 2025-05-06 PROCEDURE — 99214 OFFICE O/P EST MOD 30 MIN: CPT | Mod: S$GLB,,, | Performed by: INTERNAL MEDICINE

## 2025-05-06 PROCEDURE — 85652 RBC SED RATE AUTOMATED: CPT

## 2025-05-06 PROCEDURE — 86225 DNA ANTIBODY NATIVE: CPT

## 2025-05-06 PROCEDURE — 99999 PR PBB SHADOW E&M-EST. PATIENT-LVL III: CPT | Mod: PBBFAC,,, | Performed by: INTERNAL MEDICINE

## 2025-05-06 PROCEDURE — 3078F DIAST BP <80 MM HG: CPT | Mod: CPTII,S$GLB,, | Performed by: INTERNAL MEDICINE

## 2025-05-06 PROCEDURE — 86160 COMPLEMENT ANTIGEN: CPT

## 2025-05-06 PROCEDURE — 85025 COMPLETE CBC W/AUTO DIFF WBC: CPT | Mod: PO

## 2025-05-06 PROCEDURE — 86235 NUCLEAR ANTIGEN ANTIBODY: CPT

## 2025-05-06 PROCEDURE — 84443 ASSAY THYROID STIM HORMONE: CPT

## 2025-05-06 PROCEDURE — 36415 COLL VENOUS BLD VENIPUNCTURE: CPT | Mod: PO

## 2025-05-06 PROCEDURE — 85730 THROMBOPLASTIN TIME PARTIAL: CPT

## 2025-05-06 PROCEDURE — 99999 PR PBB SHADOW E&M-EST. PATIENT-LVL IV: CPT | Mod: PBBFAC,,,

## 2025-05-06 PROCEDURE — 84432 ASSAY OF THYROGLOBULIN: CPT | Mod: PO

## 2025-05-06 PROCEDURE — 86039 ANTINUCLEAR ANTIBODIES (ANA): CPT

## 2025-05-06 PROCEDURE — 3008F BODY MASS INDEX DOCD: CPT | Mod: CPTII,S$GLB,, | Performed by: INTERNAL MEDICINE

## 2025-05-06 PROCEDURE — 3075F SYST BP GE 130 - 139MM HG: CPT | Mod: CPTII,S$GLB,, | Performed by: INTERNAL MEDICINE

## 2025-05-06 PROCEDURE — 86160 COMPLEMENT ANTIGEN: CPT | Mod: 59

## 2025-05-06 RX ORDER — EZETIMIBE 10 MG/1
10 TABLET ORAL DAILY
Qty: 90 TABLET | Refills: 1 | Status: SHIPPED | OUTPATIENT
Start: 2025-05-06 | End: 2026-05-06

## 2025-05-06 NOTE — PROGRESS NOTES
Subjective:    Patient ID:  Ruma Nguyen is a 54 y.o. female who presents for Coronary artery disease involving native coronary artery of        HPI    DISCUSSED LABS AND GOALS CMP OK HEMOGLOBIN NORMAL MAGNESIUM 1.9, LDL 65 UP, HDL 36, TRIGLYCERIDE 100, STILL SMOKES, SEE ROS  Past Medical History:   Diagnosis Date    Anticoagulant long-term use     Antiphospholipid syndrome     Cancer     skin, melanoma    Colon polyps     Coronary artery disease     Heart murmur     Hyperlipidemia     Hypertension     PVD (peripheral vascular disease) with claudication 2023    RA (rheumatoid arthritis)     Radiculopathy     lumbar    Rheumatoid arthritis of multiple sites without rheumatoid factor     Thyroid nodule 2023     Past Surgical History:   Procedure Laterality Date    ADENOIDECTOMY      ANGIOGRAM, ABDOMINAL AORTA W/ EXTREMITY RUNOFF  2024    Procedure: Abdominal w/Runoff;  Surgeon: Benjamin Bernal MD;  Location: [facility] CATH;  Service: Cardiology;;    ANGIOGRAM, CORONARY, WITH LEFT HEART CATHETERIZATION  2023    Procedure: Left Heart Cath;  Surgeon: Benjamin Bernal MD;  Location: [facility] CATH;  Service: Cardiology;;    BREAST SURGERY  2006    Augmentation     SECTION      COLONOSCOPY N/A 2020    Procedure: COLONOSCOPY;  Surgeon: Lalo De Leon MD;  Location: [facility] ENDO;  Service: Endoscopy;  Laterality: N/A;    CORONARY ANGIOGRAPHY  2023    Procedure: -;  Surgeon: Benjamin Bernal MD;  [facility]ATH;  Service: Cardiology;;    HERNIA REPAIR      x 3    PERCUTANEOUS CORONARY INTERVENTION, ARTERY  2023    Procedure: RED LCX;  Surgeon: Benjamin Bernal MD;  [facility];  Service: Cardiology;;    POLYPECTOMY      STENT, DRUG ELUTING, SINGLE VESSEL, CORONARY  2023    Procedure: -;  Surgeon: Benjamin Bernal MD;  Location: [facility] CATH;  Service: Cardiology;;    STENT, ILIAC ARTERY  2024    Procedure: PTA / Stent Lt. Iliac Artery;  Surgeon: Benjamin Bernal MD;  [facility];  Service: Cardiology;;    THYROIDECTOMY Left 12/27/2023    Procedure: THYROIDECTOMY - LEFT LOBE THYROIDECTOMY;  Surgeon: Pravin Mujica MD;  Location: [facility] OR;  Service: General;  Laterality: Left;    TUBAL LIGATION Bilateral 1995     Family History   Problem Relation Name Age of Onset    Heart disease Mother Carole alejandre     Hypertension Mother Carole alejandre     Asthma Mother Carole alejandre     Cancer Father Wayne Alejandre     Colon cancer Father Wayne Alejandre 42    No Known Problems Sister      Diabetes Brother Chin alejandre     Kidney cancer Brother Chin alejandre     Testicular cancer Brother Chin alejandre     Heart disease Brother Chin alejandre     Arthritis Maternal Grandmother Lu Kaur     Diabetes Son Bulmaro jenkins     Breast cancer Neg Hx      Cervical cancer Neg Hx      Ovarian cancer Neg Hx       Social History     Socioeconomic History    Marital status:    Tobacco Use    Smoking status: Every Day     Current packs/day: 0.75     Average packs/day: 0.8 packs/day for 38.0 years (28.5 ttl pk-yrs)     Types: Cigarettes    Smokeless tobacco: Never    Tobacco comments:     Needs help to quit; had problems getting chantix   Substance and Sexual Activity    Alcohol use: No    Drug use: No    Sexual activity: Yes     Partners: Male     Social Drivers of Health     Financial Resource Strain: Low Risk  (6/3/2024)    Overall Financial Resource Strain (CARDIA)     Difficulty of Paying Living Expenses: Not hard at all   Food Insecurity: No Food Insecurity (6/3/2024)    Hunger Vital Sign     Worried About Running Out of Food in the Last Year: Never true     Ran Out of Food in the Last Year: Never true   Transportation Needs: No Transportation Needs (12/28/2023)    PRAPARE - Transportation     Lack of Transportation (Medical): No     Lack of Transportation (Non-Medical): No   Physical Activity: Sufficiently Active (6/3/2024)    Exercise Vital Sign     Days of Exercise per Week: 7 days      "Minutes of Exercise per Session: 60 min   Stress: Stress Concern Present (6/3/2024)    [place] [employer] - Occupational Stress Questionnaire     Feeling of Stress : Very much   Housing Stability: Low Risk  (12/28/2023)    Housing Stability Vital Sign     Unable to Pay for Housing in the Last Year: No     Number of Places Lived in the Last Year: 1     Unstable Housing in the Last Year: No       Review of patient's allergies indicates:  No Known Allergies  Current Medications[1]    Review of Systems   Constitutional: Negative for decreased appetite, fever, weight gain and weight loss.   HENT:  Positive for hearing loss. Negative for congestion and sore throat.    Eyes:  Negative for blurred vision and redness.   Cardiovascular:  Negative for chest pain, claudication (MINIMAL), cyanosis, dyspnea on exertion, irregular heartbeat, leg swelling (ANKLES), near-syncope, orthopnea (no PND), palpitations, paroxysmal nocturnal dyspnea and syncope.   Respiratory:  Negative for cough, hemoptysis and wheezing.    Endocrine: Negative for polydipsia and polyuria.   Hematologic/Lymphatic: Negative for adenopathy. Bruises/bleeds easily: OCC.   Skin: Negative.    Musculoskeletal:  Negative for back pain, joint pain and muscle cramps.   Gastrointestinal:  Negative for abdominal pain, dysphagia, melena, nausea and vomiting.   Genitourinary:  Negative for dysuria and flank pain.   Neurological:  Negative for dizziness, focal weakness, light-headedness, loss of balance and paresthesias (NEUROPATHY).   Psychiatric/Behavioral:  Negative for altered mental status and depression.    Allergic/Immunologic: Negative for hives and persistent infections.        Objective:      Vitals:    05/06/25 0951 05/06/25 1009   BP: (!) 141/71 132/70   Pulse: 62    Weight: 76.7 kg (169 lb 1.5 oz)    Height: 5' 6" (1.676 m)    PainSc: 0-No pain      Body mass index is 27.29 kg/m².    Physical Exam  Constitutional:       Appearance: " Normal appearance.   HENT:      Head: Normocephalic and atraumatic.   Eyes:      General: No scleral icterus.     Extraocular Movements: Extraocular movements intact.   Neck:      Vascular: Carotid bruit present.   Cardiovascular:      Rate and Rhythm: Normal rate. No extrasystoles are present.     Pulses:           Carotid pulses are 2+ on the right side and 2+ on the left side with bruit.       Radial pulses are 2+ on the right side and 2+ on the left side.        Dorsalis pedis pulses are 2+ on the right side and 2+ on the left side.        Posterior tibial pulses are 2+ on the right side and 2+ on the left side.      Heart sounds: Murmur heard.      Systolic murmur is present with a grade of 1/6 at the lower left sternal border.      Diastolic murmur is present with a grade of 1/4 at the upper right sternal border.      No friction rub. No gallop.   Pulmonary:      Effort: Pulmonary effort is normal.      Breath sounds: Normal breath sounds and air entry.   Abdominal:      Palpations: Abdomen is soft.      Tenderness: There is no abdominal tenderness.   Musculoskeletal:      Cervical back: Neck supple.      Right lower leg: No edema.      Left lower leg: No edema.   Skin:     Capillary Refill: Capillary refill takes less than 2 seconds.   Neurological:      General: No focal deficit present.      Mental Status: She is alert and oriented to person, place, and time.      Cranial Nerves: Cranial nerve deficit ([place]) present.   Psychiatric:         Mood and Affect: Mood normal.         Speech: Speech normal.         Behavior: Behavior normal.                 ..    Chemistry        Component Value Date/Time     05/06/2025 1145     01/06/2025 1300     04/11/2019 1152    K 3.9 05/06/2025 1145    K 4.1 01/06/2025 1300     05/06/2025 1145     01/06/2025 1300     04/11/2019 1152    CO2 24 05/06/2025 1145    CO2 26 01/06/2025 1300    BUN 8 05/06/2025 1145    CREATININE 0.8 05/06/2025 1145     CREATININE 0.71 04/11/2019 1152     05/06/2025 1145     01/06/2025 1300    GLU 96 04/11/2019 1152        Component Value Date/Time    CALCIUM 9.5 05/06/2025 1145    CALCIUM 10.0 01/06/2025 1300    ALKPHOS 85 05/06/2025 1145    ALKPHOS 78 01/06/2025 1300    AST 25 05/06/2025 1145    AST 27 01/06/2025 1300    AST 18 01/27/2016 0915    ALT 25 05/06/2025 1145    ALT 21 01/06/2025 1300    BILITOT 0.5 05/06/2025 1145    BILITOT 0.4 01/06/2025 1300    ESTGFRAFRICA >60 06/05/2020 1457    EGFRNONAA >60 06/05/2020 1457            ..  Lab Results   Component Value Date    CHOL 121 04/29/2025    CHOL 93 (L) 08/31/2023    CHOL 192 05/01/2023     Lab Results   Component Value Date    HDL 36 (L) 04/29/2025    HDL 27 (L) 08/31/2023    HDL 32 (L) 05/01/2023     Lab Results   Component Value Date    LDLCALC 65.0 04/29/2025    LDLCALC 46.4 (L) 08/31/2023    LDLCALC 123.4 05/01/2023     Lab Results   Component Value Date    TRIG 100 04/29/2025    TRIG 98 08/31/2023    TRIG 183 (H) 05/01/2023     Lab Results   Component Value Date    CHOLHDL 29.8 04/29/2025    CHOLHDL 29.0 08/31/2023    CHOLHDL 16.7 (L) 05/01/2023     ..  Lab Results   Component Value Date    WBC 5.71 05/06/2025    HGB 14.4 05/06/2025    HCT 41.7 05/06/2025     (H) 05/06/2025     05/06/2025       Test(s) Reviewed  I have reviewed the following in detail:  [] Stress test   [] Angiography   [] Echocardiogram   [x] Labs   [] Other:       Assessment:         ICD-10-CM ICD-9-CM   1. Coronary artery disease involving native coronary artery of native heart without angina pectoris  I25.10 414.01   2. PVD (peripheral vascular disease) with claudication  I73.9 443.9   3. Tobacco abuse counseling  Z71.6 V65.42     305.1   4. Mild mitral and aortic regurgitation  I08.0 396.3   5. Atherosclerosis of abdominal aorta  I70.0 440.0   6. Mixed dyslipidemia  E78.2 272.2   7. Long term (current) use of antithrombotics/antiplatelets  Z79.02 V58.63   8. Mild  carotid artery disease  I77.9 447.9     Problem List Items Addressed This Visit          Cardiac/Vascular    Mixed dyslipidemia    Atherosclerosis of abdominal aorta    Mild mitral and aortic regurgitation    Relevant Orders    Echo    Coronary artery disease involving native coronary artery of native heart without angina pectoris - Primary    Relevant Orders    Echo    PVD (peripheral vascular disease) with claudication    Mild carotid artery disease       Hematology    Long term (current) use of antithrombotics/antiplatelets       Other    Tobacco abuse counseling        Plan:     ADD ZETIA, TARGET LDL LOWER, TOBACCO CESSATION, EXTENSIVE COUNSELING CHECK ECHO,     ALL CV CLINICALLY STABLE, NO ANGINA, NO HF, NO TIA, NO CLINICAL ARRHYTHMIA,CONTINUE CURRENT MEDS, EDUCATION, DIET, EXERCISE , WALKING EXERCISE PROGRAM RETURN TO CLINIC IN 6 MONTHS, DISCUSSED PLAN WITH THE PATIENT AND HER DAUGHTER        Coronary artery disease involving native coronary artery of native heart without angina pectoris  -     Echo; Future; Expected date: 11/06/2025    PVD (peripheral vascular disease) with claudication    Tobacco abuse counseling    Mild mitral and aortic regurgitation  -     Echo; Future; Expected date: 11/06/2025    Atherosclerosis of abdominal aorta  Comments:  NO EMBOLIZATION    Mixed dyslipidemia    Long term (current) use of antithrombotics/antiplatelets    Mild carotid artery disease    Other orders  -     ezetimibe (ZETIA) 10 mg tablet; Take 1 tablet (10 mg total) by mouth once daily.  Dispense: 90 tablet; Refill: 1    RTC Low level/low impact aerobic exercise 5x's/wk. Heart healthy diet and risk factor modification.    See labs and med orders.    Aerobic exercise 5x's/wk. Heart healthy diet and risk factor modification.    See labs and med orders.             [1]   Current Outpatient Medications:     albuterol-ipratropium (DUO-NEB) 2.5 mg-0.5 mg/3 mL nebulizer solution, Take 3 mLs by nebulization as needed., Disp: ,  Rfl:     amLODIPine (NORVASC) 5 MG tablet, Take 1 tablet (5 mg total) by mouth every evening., Disp: 90 tablet, Rfl: 1    aspirin (ECOTRIN) 81 MG EC tablet, Take 81 mg by mouth every evening., Disp: , Rfl:     clonazePAM (KLONOPIN) 1 MG tablet, Take 1 tablet (1 mg total) by mouth daily as needed for Anxiety., Disp: 30 tablet, Rfl: 1    clopidogreL (PLAVIX) 75 mg tablet, Take 1 tablet (75 mg total) by mouth once daily., Disp: 90 tablet, Rfl: 1    folic acid (FOLVITE) 1 MG tablet, Take 1 tablet (1 mg total) by mouth every evening., Disp: 90 tablet, Rfl: 3    methotrexate 25 mg/mL injection, INJECT 0.6 MLS (15 MG TOTAL) INTO THE SKIN EVERY 7 DAYS., Disp: 6 mL, Rfl: 1    multivitamin with minerals tablet, Take 1 tablet by mouth once daily., Disp: , Rfl:     nitroGLYCERIN (NITROSTAT) 0.4 MG SL tablet, Place 1 tablet (0.4 mg total) under the tongue every 5 (five) minutes as needed for Chest pain., Disp: 25 tablet, Rfl: 0    ondansetron (ZOFRAN-ODT) 8 MG TbDL, Take 1 tablet (8 mg total) by mouth every 6 (six) hours as needed (nausea)., Disp: 20 tablet, Rfl: 1    rosuvastatin (CRESTOR) 40 MG Tab, TAKE 1 TABLET BY MOUTH EVERY DAY IN THE EVENING, Disp: 90 tablet, Rfl: 0    sertraline (ZOLOFT) 100 MG tablet, Take 100 mg by mouth once daily., Disp: , Rfl:     ezetimibe (ZETIA) 10 mg tablet, Take 1 tablet (10 mg total) by mouth once daily., Disp: 90 tablet, Rfl: 1

## 2025-05-06 NOTE — PROGRESS NOTES
Patient ID: Ruma Nguyen is a 54 y.o. female.    Chief Complaint: Establish Care    History of Present Illness    CHIEF COMPLAINT:  Ruma presents today for follow up    CURRENT SYMPTOMS:  She reports cramping in midsection during exercise and occasional shortness of breath affecting lungs. She notes new skin issues appearing suddenly.    CARDIOVASCULAR:  She has cardiac blockage on the right side that is being monitored. She has vascular blockage in right leg under monitoring, with history of left leg vascular repair and iliac artery stent placement.    RHEUMATOLOGIC:  She has antiphospholipid syndrome requiring lifelong anticoagulation.    DERMATOLOGIC:  She has a history of melanoma with surgical removal.    SOCIAL HISTORY:  She reports smoking half a pack per day for over 40 years and expresses interest in smoking cessation. She reports previous difficulties obtaining Chantix.    MEDICAL HISTORY:  She has an enlarged liver, not due to fatty liver disease. She has spina bifida occulta. She has hearing loss requiring hearing aids for 10-15 years.    THYROID:  She has a history of thyroid issues with left thyroid removal for thyroid cancer (type unspecified) and remaining right thyroid.    SURGICAL HISTORY:  History includes tubal ligation, polypectomy, three hernia repairs with ongoing pain in one,  section, breast augmentation, and adenoid removal.    FAMILY HISTORY:  Mother has heart disease, hypertension, and asthma. Father  of colon cancer at age 42. Brother has history of testicular cancer, renal cancer, heart disease, and has undergone bypass surgery.    CURRENT MEDICATIONS:  Medications include albuterol and ipratropium PRN, amlodipine 5mg, aspirin 81mg, Klonopin 1mg PRN, clopidogrel 75mg, Zetia 10mg, folic acid 1mg nightly, methotrexate 15mg weekly, multivitamins, nitroglycerin PRN, Zofran, rosuvastatin 40mg, and Zoloft 100mg.      ROS:  ENT: reports hearing loss  Respiratory: reports  shortness of breath  Gastrointestinal: reports abdominal pain, reports muscle cramps  Musculoskeletal: reports back pain, reports limb pain, reports shooting pain sensation  Skin: reports rash         Problem List[1]    Medications Ordered Prior to Encounter[2]    Past Medical History:   Diagnosis Date    Anticoagulant long-term use     Antiphospholipid syndrome     Cancer     skin, melanoma    Colon polyps     Coronary artery disease     Heart murmur     Hyperlipidemia     Hypertension     PVD (peripheral vascular disease) with claudication 2023    RA (rheumatoid arthritis)     Radiculopathy     lumbar    Rheumatoid arthritis of multiple sites without rheumatoid factor     Thyroid nodule 2023       Past Surgical History:   Procedure Laterality Date    ADENOIDECTOMY      ANGIOGRAM, ABDOMINAL AORTA W/ EXTREMITY RUNOFF  2024    Procedure: Abdominal w/Runoff;  Surgeon: Benjamin Bernal MD;  Location: [facility] CATH;  Service: Cardiology;;    ANGIOGRAM, CORONARY, WITH LEFT HEART CATHETERIZATION  2023    Procedure: Left Heart Cath;  Surgeon: Benjamin Bernal MD;  Location: [facility] CATH;  Service: Cardiology;;    BREAST SURGERY  2006    Augmentation     SECTION      COLONOSCOPY N/A 2020    Procedure: COLONOSCOPY;  Surgeon: Lalo De Leon MD;  Location: [facility] ENDO;  Service: Endoscopy;  Laterality: N/A;    CORONARY ANGIOGRAPHY  2023    Procedure: -;  Surgeon: Benjamin Bernal MD;  Location: [facility] CATH;  Service: Cardiology;;    HERNIA REPAIR      x 3    PERCUTANEOUS CORONARY INTERVENTION, ARTERY  2023    Procedure: RED LCX;  Surgeon: Benjamin Bernal MD;  Location: [facility] CATH;  Service: Cardiology;;    POLYPECTOMY      STENT, DRUG ELUTING, SINGLE VESSEL, CORONARY  2023    Procedure: -;  Surgeon: Benjamin Bernal MD;  Location: [facility] CATH;  Service: Cardiology;;    STENT, ILIAC ARTERY  2024    Procedure: PTA / Stent Lt. Iliac Artery;  Surgeon: Benjamin Bernal MD;  [facility];   Service: Cardiology;;    THYROIDECTOMY Left 12/27/2023    Procedure: THYROIDECTOMY - LEFT LOBE THYROIDECTOMY;  Surgeon: Pravin Mujica MD;  Location: [facility] OR;  Service: General;  Laterality: Left;    TUBAL LIGATION Bilateral 1995        Family History   Problem Relation Name Age of Onset    Heart disease Mother Carole alejandre     Hypertension Mother Carole alejandre     Asthma Mother Carole alejandre     Cancer Father Wayne Alejandre     Colon cancer Father Wayne Alejandre 42    No Known Problems Sister      Diabetes Brother Chin alejandre     Kidney cancer Brother Chin alejandre     Testicular cancer Brother Chin alejandre     Heart disease Brother Chin alejandre     Arthritis Maternal Grandmother Lu Kaur     Diabetes Son Bulmaro jenkins     Breast cancer Neg Hx      Cervical cancer Neg Hx      Ovarian cancer Neg Hx         Social History[3]    Review of patient's allergies indicates:  No Known Allergies     Health Maintenance Due   Topic Date Due    Cervical Cancer Screening  Never done    HIV Screening  Never done    Mammogram  04/20/2017    LDCT Lung Screen  Never done    COVID-19 Vaccine (4 - 2024-25 season) 09/01/2024       Objective     Vitals:    05/06/25 1025   BP: 126/64   Pulse: (!) 59   Temp: 97.8 °F (36.6 °C)      Body mass index is 27.2 kg/m².     Physical Exam  Vitals and nursing note reviewed.   Constitutional:       General: She is not in acute distress.     Appearance: Normal appearance. She is not ill-appearing or toxic-appearing.   HENT:      Head: Normocephalic and atraumatic.      Nose: Nose normal.      Mouth/Throat:      Mouth: Mucous membranes are moist.   Eyes:      Extraocular Movements: Extraocular movements intact.   Cardiovascular:      Rate and Rhythm: Regular rhythm. Bradycardia present.      Heart sounds: Normal heart sounds. No murmur heard.     No friction rub. No gallop.   Pulmonary:      Effort: Pulmonary effort is normal.      Breath sounds: Normal breath sounds. No wheezing,  rhonchi or rales.   Abdominal:      General: Bowel sounds are normal.      Tenderness: There is no abdominal tenderness. There is no guarding.   Musculoskeletal:      Cervical back: Neck supple.      Right lower leg: No edema.      Left lower leg: No edema.   Lymphadenopathy:      Cervical: No cervical adenopathy.   Skin:     General: Skin is warm.   Neurological:      Mental Status: She is alert and oriented to person, place, and time.   Psychiatric:         Mood and Affect: Mood normal.         Behavior: Behavior normal.         Assessment & Plan    Assessed smoking history and cardiovascular risk factors.  Noted borderline bradycardia, not clinically significant given baseline.  Evaluated hernia-related symptoms and considered physical therapy as initial management.  Reviewed recent imaging and colonoscopy results.  Assessed skin lesions and determined need for urgent dermatology evaluation.  Considered rheumatologic etiology for new skin symptoms.  Performed focused cardiopulmonary exam.    THYROID CANCER:  - Ruma had left thyroid removed due to thyroid cancer.  - Another nodule on the right thyroid is being monitored for potential cancerous changes.  - Recommend continued monitoring and scheduled follow-up with an endocrinologist.    SPINA BIFIDA:  - Diagnosed with spina bifida occulta.    ANTIPHOSPHOLIPID SYNDROME:  - Ruma has positive antiphospholipids and is not currently using anticoagulants.  - Discussed necessity of lifelong anticoagulant therapy to prevent blood clots.  - Prescribed aspirin 81 mg and clopidogrel 75 mg.    CORONARY ARTERY DISEASE:  - Ruma has history of coronary vascular disease with family history of heart disease.  - Blood pressure is controlled and patient is under cardiology care.  - Discussed aggressive management of cholesterol, triglycerides, and diabetes prevention.  - Continued rosuvastatin 40 mg for cholesterol management.    PERIPHERAL ARTERY DISEASE:  - Monitored  claudication from peripheral artery disease with blockage in the right leg, which is being followed by vascular surgery.  - Ruma has a stent in the iliac artery and is followed by Dr. Guerrero.  - Advised continued follow-up with vascular surgery for management.    BRADYCARDIA:  - Ruma is borderline bradycardic with heart rate at rest less than 60 (documented at 59).  - Heart rate is consistently low, typically 62, 64, 61.  - Explained bradycardia and normal heart rate ranges to the patient.  - Will reassess during next physical exam.    NICOTINE DEPENDENCE:  - Ruma smokes approximately 0.5 pack per day for 40+ years.  - Discussed importance of smoking cessation for cardiovascular health, explaining that smoking exacerbates family history of coronary vascular disease, creates inflammation, and increases pain sensitivity.  - Referred to free smoking cessation program for patches and support.    LIVER DISEASE:  - Ruma has an enlarged liver (not fatty liver), possibly due to long-term steroid use.  - Discussed need for monitoring with scans.    HEARING LOSS:  - Ruma uses hearing aids which were recently adjusted and are functioning well, allowing phone use and improved hearing.    SKIN DISORDER:  - Ruma has family history of skin cancer and recently had a full body skin check.  - Referred to dermatology for urgent evaluation of new rash.    THYROID DISORDER:  - Ruma has another thyroid issue on the right side that is being monitored.  - Discussed endocrinologist referral.    PERSONAL HISTORY OF MALIGNANT MELANOMA:  - Ruma had melanoma removed with last full body skin check in 2018.    PERSONAL HISTORY OF THYROID CANCER:  - Left thyroid was removed due to thyroid cancer.    OTHER MANAGEMENT:  - Referred to general surgery for hernia repair due to pain and inability to exercise.  - Educated on signs of hernia complications requiring emergency care.  - Referred to Physical Therapy for hernia-related pain.  -  Ordered low-dose CT Lungs for lung cancer screening after explaining purpose and process.  - Documented family history   of rheumatologic conditions.    FOLLOW-UP:  - Ruma to continue regular follow-ups with various specialists for ongoing health conditions.  - Return visit in 6 months, or sooner if needed.         Cigarette nicotine dependence with other nicotine-induced disorder  -     Ambulatory referral/consult to Smoking Cessation Program; Future; Expected date: 05/13/2025  -     CT Chest Lung Screening Low Dose; Future; Expected date: 05/06/2025    Rheumatoid arthritis of multiple sites without rheumatoid factor    Anticoagulant long-term use    Hyperlipidemia, unspecified hyperlipidemia type    Hypertension, unspecified type    Encounter for screening mammogram for malignant neoplasm of breast  Comments:  POLITELY DECLINES MAMMOGRAM. ENCOURAGED TO LET ME KNOW IF SHE RECONSIDERS.    History of melanoma    Thyroid cancer  Comments:  history of medullary thyroid CA, endo following.    Spina bifida, unspecified hydrocephalus presence, unspecified spinal region        Follow up in about 6 months (around 11/6/2025), or if symptoms worsen or fail to improve.    This note was generated with the assistance of ambient listening technology. Verbal consent was obtained by the patient and accompanying visitor(s) for the recording of patient appointment to facilitate this note. I attest to having reviewed and edited the generated note for accuracy, though some syntax or spelling errors may persist. Please contact the author of this note for any clarification.        Eula France MD  05/08/2025          [1]   Patient Active Problem List  Diagnosis    RA (rheumatoid arthritis)    Colon polyps    Mixed dyslipidemia    Recurrent ventral hernia    Antiphospholipid syndrome    Rheumatoid arthritis of multiple sites with negative rheumatoid factor    Smoker    Personal history of colonic polyps    Skin cancer    Aortic  insufficiency    Antiphospholipid antibody positive    Long-term use of Plaquenil    Chronic midline low back pain with left-sided sciatica    Chronic pain syndrome    SOB (shortness of breath)    Atherosclerosis of abdominal aorta    Low left ventricular ejection fraction    Left leg claudication    Mild mitral and aortic regurgitation    Nonrheumatic aortic valve stenosis    Abnormal nuclear stress test    Coronary artery disease involving native coronary artery of native heart without angina pectoris    Stented coronary artery    Elevated left ventricular end-diastolic pressure (LVEDP)    Tobacco use    Long term (current) use of antithrombotics/antiplatelets    PVD (peripheral vascular disease) with claudication    Mild carotid artery disease    Cervical radiculopathy at C6    Drug-induced immunodeficiency    Encounter for medication monitoring    Fibromyalgia    Abnormal ankle brachial index (BRYAN)    Tobacco abuse counseling    PVD (peripheral vascular disease)    Rheumatoid arthritis of multiple sites without rheumatoid factor    Anticoagulant long-term use    Hyperlipidemia    Hypertension    Thyroid cancer    Spina bifida, unspecified hydrocephalus presence, unspecified spinal region   [2]   Current Outpatient Medications on File Prior to Visit   Medication Sig Dispense Refill    albuterol-ipratropium (DUO-NEB) 2.5 mg-0.5 mg/3 mL nebulizer solution Take 3 mLs by nebulization as needed.      amLODIPine (NORVASC) 5 MG tablet Take 1 tablet (5 mg total) by mouth every evening. 90 tablet 1    aspirin (ECOTRIN) 81 MG EC tablet Take 81 mg by mouth every evening.      clonazePAM (KLONOPIN) 1 MG tablet Take 1 tablet (1 mg total) by mouth daily as needed for Anxiety. 30 tablet 1    clopidogreL (PLAVIX) 75 mg tablet Take 1 tablet (75 mg total) by mouth once daily. 90 tablet 1    ezetimibe (ZETIA) 10 mg tablet Take 1 tablet (10 mg total) by mouth once daily. 90 tablet 1    folic acid (FOLVITE) 1 MG tablet Take 1 tablet  (1 mg total) by mouth every evening. 90 tablet 3    methotrexate 25 mg/mL injection INJECT 0.6 MLS (15 MG TOTAL) INTO THE SKIN EVERY 7 DAYS. 6 mL 1    multivitamin with minerals tablet Take 1 tablet by mouth once daily.      nitroGLYCERIN (NITROSTAT) 0.4 MG SL tablet Place 1 tablet (0.4 mg total) under the tongue every 5 (five) minutes as needed for Chest pain. 25 tablet 0    ondansetron (ZOFRAN-ODT) 8 MG TbDL Take 1 tablet (8 mg total) by mouth every 6 (six) hours as needed (nausea). 20 tablet 1    rosuvastatin (CRESTOR) 40 MG Tab TAKE 1 TABLET BY MOUTH EVERY DAY IN THE EVENING 90 tablet 0    sertraline (ZOLOFT) 100 MG tablet Take 100 mg by mouth once daily.       No current facility-administered medications on file prior to visit.   [3]   Social History  Socioeconomic History    Marital status:    Tobacco Use    Smoking status: Every Day     Current packs/day: 0.75     Average packs/day: 0.8 packs/day for 38.0 years (28.5 ttl pk-yrs)     Types: Cigarettes    Smokeless tobacco: Never    Tobacco comments:     Needs help to quit; had problems getting chantix   Substance and Sexual Activity    Alcohol use: No    Drug use: No    Sexual activity: Yes     Partners: Male     Social Drivers of Health     Financial Resource Strain: Low Risk  (6/3/2024)    Overall Financial Resource Strain (CARDIA)     Difficulty of Paying Living Expenses: Not hard at all   Food Insecurity: No Food Insecurity (6/3/2024)    Hunger Vital Sign     Worried About Running Out of Food in the Last Year: Never true     Ran Out of Food in the Last Year: Never true   Transportation Needs: No Transportation Needs (12/28/2023)    PRAPARE - Transportation     Lack of Transportation (Medical): No     Lack of Transportation (Non-Medical): No   Physical Activity: Sufficiently Active (6/3/2024)    Exercise Vital Sign     Days of Exercise per Week: 7 days     Minutes of Exercise per Session: 60 min   Stress: Stress Concern Present (6/3/2024)    [employer]  [place] [employer] - Occupational Stress Questionnaire     Feeling of Stress : Very much   Housing Stability: Low Risk  (12/28/2023)    Housing Stability Vital Sign     Unable to Pay for Housing in the Last Year: No     Number of Places Lived in the Last Year: 1     Unstable Housing in the Last Year: No

## 2025-05-07 LAB
ANA (OHS): POSITIVE
ANA PATTERN 1 (OHS): ABNORMAL
ANA TITER 1 (OHS): ABNORMAL

## 2025-05-08 LAB
DSDNA ANTIBODY (OHS): NORMAL
DSDNA ANTIBODY TITER (OHS): NORMAL
ENDOCRINOLOGIST REVIEW: NORMAL
SM  ANTIBODY (OHS): 0.14 RATIO
SM INTERPRETATION (OHS): NEGATIVE
SM/RNP ANTIBODY (OHS): 0.14 RATIO
SM/RNP INTERPRETATION (OHS): NEGATIVE
SSA  ANTIBODY (OHS): 0.14 RATIO (ref 0–0.99)
SSA INTERPRETATION (OHS): NEGATIVE
SSB  ANTIBODY (OHS): 0.07 RATIO
SSB INTERPRETATION (OHS): NEGATIVE
THYROGLOB AB SERPL IA-ACNC: <1.8 IU/ML
THYROGLOB SERPL-MCNC: 46 NG/ML

## 2025-05-09 LAB
W BETA-2 GLYCOPROTEIN 1 IGA AB: 12 U/ML
W BETA-2 GLYCOPROTEIN 1 IGG AB: 0.9 U/ML
W BETA-2 GLYCOPROTEIN 1 IGM AB: 49 U/ML
W CARDIOLIPIN IGG AB: 2.6 GPL
W CARDIOLIPIN IGM AB: 32 MPL

## 2025-05-10 LAB
ANTI-XA QUALITATIVE INTERPRETATION: NORMAL
ANTICOAGULANT MEDICATION NEUTRALIZATION: NORMAL
AR DRVVT 1:1 MIX RATIO: NORMAL
AR DRVVT CONFIRMATION RATIO: NORMAL
AR HEXAGONAL PHOSPHOLIPID CONFIRMATION: NORMAL S
AR NEUTRALIZED PTT-LA RATIO: NORMAL
AR THROMBIN TIME (TT): NORMAL S
DRVVT SCREEN RATIO: 1.17
LA 3 SCREEN W REFLEX-IMP: NORMAL
NEUTRALIZED DRVVT SCREEN RATIO: NORMAL
PROTHROMBIN TIME: 12.6 S
PTT-LA RATIO: 1.09

## 2025-05-12 ENCOUNTER — RESULTS FOLLOW-UP (OUTPATIENT)
Dept: ENDOCRINOLOGY | Facility: CLINIC | Age: 55
End: 2025-05-12

## 2025-05-12 ENCOUNTER — OFFICE VISIT (OUTPATIENT)
Dept: OBSTETRICS AND GYNECOLOGY | Facility: CLINIC | Age: 55
End: 2025-05-12
Payer: COMMERCIAL

## 2025-05-12 VITALS
DIASTOLIC BLOOD PRESSURE: 62 MMHG | WEIGHT: 170 LBS | BODY MASS INDEX: 27.32 KG/M2 | HEIGHT: 66 IN | SYSTOLIC BLOOD PRESSURE: 112 MMHG

## 2025-05-12 DIAGNOSIS — Z01.419 WELL WOMAN EXAM WITH ROUTINE GYNECOLOGICAL EXAM: Primary | ICD-10-CM

## 2025-05-12 DIAGNOSIS — Z87.898 H/O ABNORMAL MAMMOGRAM: ICD-10-CM

## 2025-05-12 DIAGNOSIS — Z12.4 CERVICAL CANCER SCREENING: ICD-10-CM

## 2025-05-12 DIAGNOSIS — Z98.82 H/O BREAST IMPLANT: ICD-10-CM

## 2025-05-12 PROCEDURE — 87624 HPV HI-RISK TYP POOLED RSLT: CPT

## 2025-05-12 PROCEDURE — 99999 PR PBB SHADOW E&M-EST. PATIENT-LVL IV: CPT | Mod: PBBFAC,,,

## 2025-05-12 PROCEDURE — 88175 CYTOPATH C/V AUTO FLUID REDO: CPT

## 2025-05-12 RX ORDER — POLYETHYLENE GLYCOL 3350 17 G/17G
POWDER, FOR SOLUTION ORAL
COMMUNITY
Start: 2024-12-09

## 2025-05-12 RX ORDER — HYDROXYZINE PAMOATE 25 MG/1
25-50 CAPSULE ORAL 2 TIMES DAILY
COMMUNITY
Start: 2025-04-02

## 2025-05-12 NOTE — PROGRESS NOTES
HISTORY OF PRESENT ILLNESS:    Ruma Nguyen is a 54 y.o. female, , Patient's last menstrual period was 2022.,  presents for a routine annual exam . She is a new patient here to establish care. Last  GYN eval was 10 years ago in [place]. She denies history of abnormal pap smears. Natural menopause at age 51. She reports an abnormal mammogram in the past with further benign findings and a marker placed. Believes this to be placed in the right breast.   Breast Augmentation in . She had a post op infection in the left breast. Since surgery left breast has remained firm to touch. She denies any pain. No nipple discharge, no skin changes.      Last colon ca screenin   SA: male partner,   Contraception: post menopausal status.    Sexually transmitted infection risk: very low risk of STD exposure.     This is the extent of the patient's complaints at this time.     Past Medical History:   Diagnosis Date    Anticoagulant long-term use     Antiphospholipid syndrome     Cancer     skin, melanoma    Colon polyps     Coronary artery disease     Heart murmur     Hyperlipidemia     Hypertension     PVD (peripheral vascular disease) with claudication 2023    RA (rheumatoid arthritis)     Radiculopathy     lumbar    Rheumatoid arthritis of multiple sites without rheumatoid factor     Thyroid nodule 2023       Past Surgical History:   Procedure Laterality Date    ADENOIDECTOMY      ANGIOGRAM, ABDOMINAL AORTA W/ EXTREMITY RUNOFF  2024    Procedure: Abdominal w/Runoff;  Surgeon: Benjamin Bernal MD;  Location: [facility] CATH;  Service: Cardiology;;    ANGIOGRAM, CORONARY, WITH LEFT HEART CATHETERIZATION  2023    Procedure: Left Heart Cath;  Surgeon: eBnjamin Bernal MD;  Location: [facility] CATH;  Service: Cardiology;;    BREAST SURGERY  2006    Augmentation     SECTION      COLONOSCOPY N/A 2020    Procedure: COLONOSCOPY;  Surgeon: Lalo De Leon MD;  Location: [facility] ENDO;  Service:  Endoscopy;  Laterality: N/A;    CORONARY ANGIOGRAPHY  2023    Procedure: -;  Surgeon: Benjamin Bernal MD;  Location: [facility] CATH;  Service: Cardiology;;    HERNIA REPAIR      x 3    PERCUTANEOUS CORONARY INTERVENTION, ARTERY  2023    Procedure: RED LCX;  Surgeon: Benjamin Bernal MD;  Location: [facility] CATH;  Service: Cardiology;;    POLYPECTOMY      STENT, DRUG ELUTING, SINGLE VESSEL, CORONARY  2023    Procedure: -;  Surgeon: Benjamin Bernal MD;  Location: [facility] CATH;  Service: Cardiology;;    STENT, ILIAC ARTERY  2024    Procedure: PTA / Stent Lt. Iliac Artery;  Surgeon: Benjamin Bernal MD;  Location: [facility] CATH;  Service: Cardiology;;    THYROIDECTOMY Left 2023    Procedure: THYROIDECTOMY - LEFT LOBE THYROIDECTOMY;  Surgeon: Pravin Mujica MD;  Location: [facility] OR;  Service: General;  Laterality: Left;    TUBAL LIGATION Bilateral        MEDICATIONS AND ALLERGIES:    Current Medications[1]    Review of patient's allergies indicates:  No Known Allergies    Family History   Problem Relation Name Age of Onset    Heart disease Mother Carole jamie     Hypertension Mother Carole jamie     Asthma Mother Carole jamie     Cancer Father Wayne Carrilloatte     Colon cancer Father Wayne Carrilloatte 42    No Known Problems Sister      Diabetes Brother Chin jamie     Kidney cancer Brother Chin jamie     Testicular cancer Brother Chin jamie     Heart disease Brother Chin jamie     Arthritis Maternal Grandmother Lu Kaur     Diabetes Son Bulmaro jenkins     Breast cancer Neg Hx      Cervical cancer Neg Hx      Ovarian cancer Neg Hx         Social History[2]    OB History    Para Term  AB Living   2 2 2      SAB IAB Ectopic Multiple Live Births       2      # Outcome Date GA Lbr Elian/2nd Weight Sex Type Anes PTL Lv   2 Term            1 Term                   COMPREHENSIVE GYN HISTORY:  PAP History: Denies abnormal Paps.  Infection History: Denies STDs. Denies PID.  Benign History:  "Denies uterine fibroids. Denies ovarian cysts. Denies endometriosis. Denies other conditions.  Cancer History: Denies cervical cancer. Denies uterine cancer or hyperplasia. Denies ovarian cancer. Denies vulvar cancer or pre-cancer. Denies vaginal cancer or pre-cancer. Denies breast cancer. Denies colon cancer.      ROS:  GENERAL: No weight changes. No swelling. No fatigue. No fever.  BREASTS: No pain. No lumps. No discharge.  ABDOMEN: No pain. No nausea. No vomiting. No diarrhea. No constipation.  REPRODUCTIVE: No abnormal bleeding. No pelvic pain.   VULVA: No pain. No lesions. No itching.  VAGINA: No relaxation. No itching. No odor. No discharge. No lesions.  URINARY: No incontinence. No nocturia. No frequency. No dysuria.    /62 (Patient Position: Sitting)   Ht 5' 6" (1.676 m)   Wt 77.1 kg (169 lb 15.6 oz)   LMP 02/01/2022   BMI 27.43 kg/m²     PE:  Physical Exam:   Constitutional: She is oriented to person, place, and time. She appears well-developed and well-nourished. No distress.    HENT:   Head: Normocephalic.    Eyes: Pupils are equal, round, and reactive to light.      Pulmonary/Chest: Effort normal. No respiratory distress. She exhibits no mass, no tenderness, no laceration, no edema, no deformity, no swelling and no retraction. Right breast exhibits augmentation. Right breast exhibits no inverted nipple, no mass, no nipple discharge, no skin change, no tenderness, no bleeding and no swelling. Left breast exhibits augmentation (left breast palpates hard throughout, no mass appreciated). Left breast exhibits no inverted nipple, no mass, no nipple discharge, no skin change, no tenderness, no bleeding and no swelling.        Abdominal: Soft. She exhibits no distension. There is no abdominal tenderness.     Genitourinary:    Inguinal canal, vagina, uterus, right adnexa and left adnexa normal.   The external female genitalia was normal.     Labial bartholins normal.Cervix is normal.         "   Musculoskeletal: Normal range of motion and moves all extremeties.       Neurological: She is alert and oriented to person, place, and time.    Skin: Skin is warm and dry.    Psychiatric: She has a normal mood and affect. Judgment and thought content normal.      PROCEDURES/ORDERS:  Pap      Assessment/Plan:    Well woman exam with routine gynecological exam  -     Liquid-Based Pap Smear, Screening    H/O abnormal mammogram  -     US Breast Bilateral Limited; Future; Expected date: 05/12/2025        - Strongly encouraged breast cancer screening with mammogram. She politely declines. Will obtain US and reevaluate    H/O breast implant  -     US Breast Bilateral Limited; Future; Expected date: 05/12/2025    Cervical cancer screening  -     Liquid-Based Pap Smear, Screening        COUNSELING:  The patient was counseled today on:  -A.C.S. Pap and pelvic exam guidelines, recomendations for yearly mammogram, monthly self breast exams and to follow up with her PCP for other health maintenance.    FOLLOW-UP  annually for [employer].          [1]   Current Outpatient Medications:     albuterol-ipratropium (DUO-NEB) 2.5 mg-0.5 mg/3 mL nebulizer solution, Take 3 mLs by nebulization as needed., Disp: , Rfl:     amLODIPine (NORVASC) 5 MG tablet, Take 1 tablet (5 mg total) by mouth every evening., Disp: 90 tablet, Rfl: 1    aspirin (ECOTRIN) 81 MG EC tablet, Take 81 mg by mouth every evening., Disp: , Rfl:     clonazePAM (KLONOPIN) 1 MG tablet, Take 1 tablet (1 mg total) by mouth daily as needed for Anxiety., Disp: 30 tablet, Rfl: 1    clopidogreL (PLAVIX) 75 mg tablet, Take 1 tablet (75 mg total) by mouth once daily., Disp: 90 tablet, Rfl: 1    ezetimibe (ZETIA) 10 mg tablet, Take 1 tablet (10 mg total) by mouth once daily., Disp: 90 tablet, Rfl: 1    folic acid (FOLVITE) 1 MG tablet, Take 1 tablet (1 mg total) by mouth every evening., Disp: 90 tablet, Rfl: 3    hydrOXYzine pamoate (VISTARIL) 25 MG Cap, Take 25-50 mg by mouth 2 (two)  times daily., Disp: , Rfl:     methotrexate 25 mg/mL injection, INJECT 0.6 MLS (15 MG TOTAL) INTO THE SKIN EVERY 7 DAYS., Disp: 6 mL, Rfl: 1    multivitamin with minerals tablet, Take 1 tablet by mouth once daily., Disp: , Rfl:     nitroGLYCERIN (NITROSTAT) 0.4 MG SL tablet, Place 1 tablet (0.4 mg total) under the tongue every 5 (five) minutes as needed for Chest pain., Disp: 25 tablet, Rfl: 0    ondansetron (ZOFRAN-ODT) 8 MG TbDL, Take 1 tablet (8 mg total) by mouth every 6 (six) hours as needed (nausea)., Disp: 20 tablet, Rfl: 1    polyethylene glycol (GLYCOLAX) 17 gram/dose powder, Take by mouth., Disp: , Rfl:     rosuvastatin (CRESTOR) 40 MG Tab, TAKE 1 TABLET BY MOUTH EVERY DAY IN THE EVENING, Disp: 90 tablet, Rfl: 0    sertraline (ZOLOFT) 100 MG tablet, Take 100 mg by mouth once daily., Disp: , Rfl:   [2]   Social History  Socioeconomic History    Marital status:    Tobacco Use    Smoking status: Every Day     Current packs/day: 0.75     Average packs/day: 0.8 packs/day for 38.0 years (28.5 ttl pk-yrs)     Types: Cigarettes    Smokeless tobacco: Never    Tobacco comments:     Needs help to quit; had problems getting chantix   Substance and Sexual Activity    Alcohol use: No    Drug use: No    Sexual activity: Yes     Partners: Male     Social Drivers of Health     Financial Resource Strain: Low Risk  (6/3/2024)    Overall Financial Resource Strain (CARDIA)     Difficulty of Paying Living Expenses: Not hard at all   Food Insecurity: No Food Insecurity (6/3/2024)    Hunger Vital Sign     Worried About Running Out of Food in the Last Year: Never true     Ran Out of Food in the Last Year: Never true   Transportation Needs: No Transportation Needs (12/28/2023)    PRAPARE - Transportation     Lack of Transportation (Medical): No     Lack of Transportation (Non-Medical): No   Physical Activity: Sufficiently Active (6/3/2024)    Exercise Vital Sign     Days of Exercise per Week: 7 days     Minutes of Exercise  per Session: 60 min   Stress: Stress Concern Present (6/3/2024)    [employer] - Occupational Stress Questionnaire     Feeling of Stress : Very much   Housing Stability: Low Risk  (12/28/2023)    Housing Stability Vital Sign     Unable to Pay for Housing in the Last Year: No     Number of Places Lived in the Last Year: 1     Unstable Housing in the Last Year: No

## 2025-05-19 ENCOUNTER — HOSPITAL ENCOUNTER (OUTPATIENT)
Dept: RADIOLOGY | Facility: HOSPITAL | Age: 55
Discharge: HOME | End: 2025-05-19
Payer: COMMERCIAL

## 2025-05-19 DIAGNOSIS — F17.218 CIGARETTE NICOTINE DEPENDENCE WITH OTHER NICOTINE-INDUCED DISORDER: ICD-10-CM

## 2025-05-19 DIAGNOSIS — I10 PRIMARY HYPERTENSION: ICD-10-CM

## 2025-05-19 PROCEDURE — 71271 CT THORAX LUNG CANCER SCR C-: CPT | Mod: 26,,, | Performed by: RADIOLOGY

## 2025-05-19 PROCEDURE — 71271 CT THORAX LUNG CANCER SCR C-: CPT | Mod: TC,PO

## 2025-05-19 RX ORDER — AMLODIPINE BESYLATE 5 MG/1
5 TABLET ORAL NIGHTLY
Qty: 90 TABLET | Refills: 1 | Status: SHIPPED | OUTPATIENT
Start: 2025-05-19

## 2025-05-20 ENCOUNTER — RESULTS FOLLOW-UP (OUTPATIENT)
Dept: OBSTETRICS AND GYNECOLOGY | Facility: CLINIC | Age: 55
End: 2025-05-20

## 2025-05-20 NOTE — TELEPHONE ENCOUNTER
Refill Routing Note   Medication(s) are not appropriate for processing by [facility] for the following reason(s):        Non-participating provider    [facility] action(s):  Route               Appointments  past 12m or future 3m with PCP    Date Provider   Last Visit   5/6/2025 Eula France MD   Next Visit   11/6/2025 Eula France MD   ED visits in past 90 days: 0        Note composed:7:38 PM 05/19/2025

## 2025-05-23 ENCOUNTER — TELEPHONE (OUTPATIENT)
Dept: FAMILY MEDICINE | Facility: CLINIC | Age: 55
End: 2025-05-23
Payer: COMMERCIAL

## 2025-05-23 ENCOUNTER — RESULTS FOLLOW-UP (OUTPATIENT)
Dept: FAMILY MEDICINE | Facility: CLINIC | Age: 55
End: 2025-05-23

## 2025-05-23 NOTE — TELEPHONE ENCOUNTER
----- Message from Mag Crystal PA-C sent at 5/23/2025  2:36 PM CDT -----  Please schedule patient with me or another provider to review this CT chest scan with her to discuss options for follow up.  Thanks!  ----- Message -----  From: Interface, Rad Results In  Sent: 5/20/2025  10:16 AM CDT  To: Eula France MD

## 2025-05-29 ENCOUNTER — PATIENT MESSAGE (OUTPATIENT)
Dept: FAMILY MEDICINE | Facility: CLINIC | Age: 55
End: 2025-05-29
Payer: COMMERCIAL

## 2025-06-02 ENCOUNTER — TELEPHONE (OUTPATIENT)
Facility: CLINIC | Age: 55
End: 2025-06-02
Payer: COMMERCIAL

## 2025-06-02 ENCOUNTER — OFFICE VISIT (OUTPATIENT)
Dept: FAMILY MEDICINE | Facility: CLINIC | Age: 55
End: 2025-06-02
Payer: COMMERCIAL

## 2025-06-02 VITALS
WEIGHT: 168 LBS | DIASTOLIC BLOOD PRESSURE: 78 MMHG | HEART RATE: 62 BPM | OXYGEN SATURATION: 96 % | BODY MASS INDEX: 27 KG/M2 | HEIGHT: 66 IN | SYSTOLIC BLOOD PRESSURE: 130 MMHG

## 2025-06-02 DIAGNOSIS — Z72.0 TOBACCO USE: ICD-10-CM

## 2025-06-02 DIAGNOSIS — C73 THYROID CANCER: ICD-10-CM

## 2025-06-02 DIAGNOSIS — R91.8 ABNORMAL CT LUNG SCREENING: Primary | ICD-10-CM

## 2025-06-02 PROCEDURE — 3078F DIAST BP <80 MM HG: CPT | Mod: CPTII,S$GLB,, | Performed by: PHYSICIAN ASSISTANT

## 2025-06-02 PROCEDURE — 99214 OFFICE O/P EST MOD 30 MIN: CPT | Mod: S$GLB,,, | Performed by: PHYSICIAN ASSISTANT

## 2025-06-02 PROCEDURE — 3075F SYST BP GE 130 - 139MM HG: CPT | Mod: CPTII,S$GLB,, | Performed by: PHYSICIAN ASSISTANT

## 2025-06-02 PROCEDURE — 1159F MED LIST DOCD IN RCRD: CPT | Mod: CPTII,S$GLB,, | Performed by: PHYSICIAN ASSISTANT

## 2025-06-02 PROCEDURE — 99999 PR PBB SHADOW E&M-EST. PATIENT-LVL V: CPT | Mod: PBBFAC,,, | Performed by: PHYSICIAN ASSISTANT

## 2025-06-02 PROCEDURE — 3008F BODY MASS INDEX DOCD: CPT | Mod: CPTII,S$GLB,, | Performed by: PHYSICIAN ASSISTANT

## 2025-06-03 ENCOUNTER — TELEPHONE (OUTPATIENT)
Facility: CLINIC | Age: 55
End: 2025-06-03
Payer: COMMERCIAL

## 2025-06-10 ENCOUNTER — OFFICE VISIT (OUTPATIENT)
Facility: CLINIC | Age: 55
End: 2025-06-10
Payer: COMMERCIAL

## 2025-06-10 ENCOUNTER — TUMOR BOARD CONFERENCE (OUTPATIENT)
Dept: HEMATOLOGY/ONCOLOGY | Facility: CLINIC | Age: 55
End: 2025-06-10
Payer: COMMERCIAL

## 2025-06-10 VITALS
HEIGHT: 66 IN | BODY MASS INDEX: 27.49 KG/M2 | RESPIRATION RATE: 16 BRPM | WEIGHT: 171.06 LBS | SYSTOLIC BLOOD PRESSURE: 126 MMHG | OXYGEN SATURATION: 96 % | HEART RATE: 60 BPM | TEMPERATURE: 97 F | DIASTOLIC BLOOD PRESSURE: 74 MMHG

## 2025-06-10 DIAGNOSIS — R91.8 PULMONARY NODULES: Primary | ICD-10-CM

## 2025-06-10 DIAGNOSIS — Z87.891 PERSONAL HISTORY OF TOBACCO USE, PRESENTING HAZARDS TO HEALTH: ICD-10-CM

## 2025-06-10 DIAGNOSIS — R91.8 ABNORMAL CT LUNG SCREENING: ICD-10-CM

## 2025-06-10 PROCEDURE — 99999 PR PBB SHADOW E&M-EST. PATIENT-LVL IV: CPT | Mod: PBBFAC,,, | Performed by: EMERGENCY MEDICINE

## 2025-06-10 NOTE — PROGRESS NOTES
[facility]   Initial Office Visit  Chief Complaint   Patient presents with     NEW PATIENT - right lung base       SUBJECTIVE:     History of Present Illness:  Patient is a 54 y.o. female presents for evaluation. Pulmonary evaluation for pulmoanry nodules referred by RUSLAN Crystal.      HPI:  Ms. Nguyen reports night sweats for a few years, attributed to menopause. She occasionally expectorates mucus, but not on a daily or weekly basis. Ms. Nguyen has a history of rheumatoid arthritis and antiphospholipid syndrome, well-controlled with methotrexate. She stopped smoking approximately 3 weeks ago, having previously smoked half a pack per day for 40 years. Ms. Nguyen has an albuterol inhaler for use when ill, but infrequently requires it.     Ms. Nguyen denies coughing, wheezing, shortness of breath, fevers, chills, and unexplained weight loss. She denies any family history of lung disease other than her grandmother's lung cancer. She has no acute complaints.      [place]    Smoking Hx: 20 pack years, quit 3 weeks ago    Past Medical History:   Diagnosis Date    Anticoagulant long-term use     Antiphospholipid syndrome     Cancer     skin, melanoma    Colon polyps     Coronary artery disease     Heart murmur     Hyperlipidemia     Hypertension     PVD (peripheral vascular disease) with claudication 07/2023    RA (rheumatoid arthritis)     Radiculopathy     lumbar    Rheumatoid arthritis of multiple sites without rheumatoid factor     Thyroid nodule 07/2023     Past Surgical History:   Procedure Laterality Date    ADENOIDECTOMY      ANGIOGRAM, ABDOMINAL AORTA W/ EXTREMITY RUNOFF  7/23/2024    Procedure: Abdominal w/Runoff;  Surgeon: Benjamin Bernal MD;  Location: [facility] CATH;  Service: Cardiology;;    ANGIOGRAM, CORONARY, WITH LEFT HEART CATHETERIZATION  7/13/2023    Procedure: Left Heart Cath;  Surgeon: Benjamin Bernal MD;  Location: [facility] CATH;   Service: Cardiology;;    BREAST SURGERY  2006    Augmentation     SECTION      COLONOSCOPY N/A 2020    Procedure: COLONOSCOPY;  Surgeon: Lalo De Leon MD;  Location: [facility] ENDO;  Service: Endoscopy;  Laterality: N/A;    CORONARY ANGIOGRAPHY  2023    Procedure: -;  Surgeon: Benjamin Bernal MD;  Location: [facility] CATH;  Service: Cardiology;;    HERNIA REPAIR      x 3    PERCUTANEOUS CORONARY INTERVENTION, ARTERY  2023    Procedure: RED LCX;  Surgeon: Benjamin Bernal MD;  Location: [facility] CATH;  Service: Cardiology;;    POLYPECTOMY      STENT, DRUG ELUTING, SINGLE VESSEL, CORONARY  2023    Procedure: -;  Surgeon: Benjamin Bernal MD;  [facility];  Service: Cardiology;;    STENT, ILIAC ARTERY  2024    Procedure: PTA / Stent Lt. Iliac Artery;  Surgeon: Benjamin Bernal MD;  Location: [facility] CATH;  Service: Cardiology;;    THYROIDECTOMY Left 2023    Procedure: THYROIDECTOMY - LEFT LOBE THYROIDECTOMY;  Surgeon: Pravin Mujica MD;  Location: [facility] OR;  Service: General;  Laterality: Left;    TUBAL LIGATION Bilateral      Family History   Problem Relation Name Age of Onset    Heart disease Mother Carole jamie     Hypertension Mother Carole jamie     Asthma Mother Carole jamie     Cancer Father Wayne Carrilloatte     Colon cancer Father Wayne Carrilloatte 42    No Known Problems Sister      Diabetes Brother Chin jamie     Kidney cancer Brother Chin jamie     Testicular cancer Brother Chin jamie     Heart disease Brother Chin jamie     Arthritis Maternal Grandmother Lu Kaur     Diabetes Son Bulmaro jenkins     Breast cancer Neg Hx      Cervical cancer Neg Hx      Ovarian cancer Neg Hx       Social History[1]     Review of patient's allergies indicates:  No Known Allergies    Medications Ordered Prior to Encounter[2]      Review of Systems     Constitutional: Negative unless otherwise commented above  HENT: Negative unless otherwise commented above  Eyes: Negative  "unless otherwise commented above  Respiratory: Negative unless otherwise commented above  Cardiovascular: Negative unless otherwise commented above  Musculoskeletal: Negative unless otherwise commented above  Skin: Negative unless otherwise commented above  Neurological: Negative unless otherwise commented above  Hematological: Negative unless otherwise commented above  Endocrine: Negative unless otherwise commented above    OBJECTIVE:     Vital Signs (Most Recent)  Visit Vitals  /74   Pulse 60   Temp 97.3 °F (36.3 °C) (Temporal)   Resp 16   Ht 5' 6" (1.676 m)   Wt 77.6 kg (171 lb 1.2 oz)   LMP 02/01/2022   SpO2 96%   BMI 27.61 kg/m²     5' 6" (1.676 m)  77.6 kg (171 lb 1.2 oz)     Physical Exam:    General: well appearing   Eye: Extraocular movements are intact, Normal conjunctiva.  No scleral icterus  HENT: Normocephalic, Oral mucosa is moist, No pharyngeal erythema, clear oropharynx  Neck: Supple, Non-tender, No jugular venous distention.  Respiratory: CTAB   Cardiovascular: RRR   Extremities: no edema   Gastrointestinal: Soft, Non-tender, Non-distended   Lymphatics: No lymphadenopathy neck, supraclavicular.   Integumentary: Warm, Dry.   Neurologic: Alert, Oriented, Normal motor function, No focal defects.       Diagnostic Results:    Echocardiogram  Results for orders placed during the hospital encounter of 02/24/23    Echo Saline Bubble? Yes    Interpretation Summary  · The left ventricle is normal in size with mildly decreased systolic function.  · The estimated ejection fraction is 45%.  · Grade I left ventricular diastolic dysfunction.  · Mild right ventricular enlargement with low normal right ventricular systolic function.  · Mild-to-moderate aortic regurgitation.  · There is mild-to-moderate aortic valve stenosis.  · Aortic valve area is 1.58 cm2; peak velocity is 1.99 m/s; mean gradient is 8 mmHg.  · Mild mitral regurgitation.  · Mild tricuspid regurgitation.  · Intermediate central venous pressure " (8 mmHg).  · The estimated PA systolic pressure is 33 mmHg.    Most Recent Nuclear Stress Test Results  Results for orders placed during the hospital encounter of 05/19/23    Nuclear Stress - Cardiology Interpreted    Interpretation Summary    Abnormal myocardial perfusion scan.    There are two significant perfusion abnormalities.    Perfusion Abnormality #1 - There is a mild intensity, small to moderate sized, reversible perfusion abnormality that is consistent with ischemia in the basal anterior wall(s).    Perfusion Abnormality #2 - There is a small sized, moderate intensity, reversible perfusion abnormality that is consistent with ischemia in the apical wall(s).    There are no other significant perfusion abnormalities.    The gated perfusion images showed an ejection fraction of 72% at rest.    There is normal wall motion at rest.    The ECG portion of the study is negative for ischemia.    The patient reported chest pain during the stress test.    There were no arrhythmias during stress.    Most Recent Cardiac PET Stress Test Results  No results found for this or any previous visit.    Most Recent Cardiovascular Angiogram  Results for orders placed during the hospital encounter of 07/23/24    Cardiac catheterization    Conclusion    The Prox L MARIPOSA to Dist L MARIPOSA lesion was 99% stenosed with 0% stenosis post-intervention.    Prox L MARIPOSA to Dist L MARIPOSA lesion: A STENT EXPRESS LD [id number] stent was successfully placed.    The Prox R CFA to Prox R SFA lesion was 50% stenosed.    The PTA was successful.    Antiplatelet treatment, consider Xarelto 2 5 mg b.i.d., tobacco cessation counseling walking exercise program.    The procedure log was documented by Documenter: Dayna Crandall RN and verified by Benjamin Bernal MD.    Date: 7/29/2024  Time: 11:59 AM      PFT Results  None on file   SpO2: 96 %      Significant Imaging:  EXAMINATION:  CT CHEST LUNG SCREENING LOW DOSE     CLINICAL HISTORY:  Lung cancer screening, >= 20  pk-yr smoking history, risk factor(s) (Age >= 50y); Nicotine dependence, cigarettes, with other nicotine-induced disorders     TECHNIQUE:  CT of the thorax was performed with low dose, lung screening protocol.  No contrast was administered.  Sagittal and coronal reconstructions were obtained.     COMPARISON:  None.     FINDINGS:  Lungs: A calcified 2 mm nodule is noted right lung apex image 192 sequence 4.  The     A noncalcified 3 mm nodule is noted right upper lobe image 239.     A nodular region of consolidation is noted at the right lung base 10 mm x 6 mm axially.  Close follow-up is necessary with three-month CT imaging.     A 2 mm nodule is noted at the left lower lobe image 321 sequence 4.     Some band like changes are noted within the lingula     A 3 mm nodule is noted left lower lobe image 362     A 4 mm nodule is noted left lower lobe image 386.     A 4 mm nodule is noted right lower lobe image 371     Some emphysematous change or for trapping is noted in the lung bases in particular bilaterally     Pleura:   No effusion..     Heart and pericardium: Normal size without effusion.     Aorta and vasculature: Evidence coronary calcifications are noted.  Atherosclerotic calcifications are noted at the aortic arch.     Chest wall and skeletal structures: Evidence of breast augmentation is noted.     Upper abdomen: Evidence of anterior abdominal wall hernia mesh repair is noted partially visualized.     Impression:     Lung-RADS Category: 4A - Suspicious - consultation advised - possible next steps 3 month LDCT -in some scenarios PET/CT.     Clinically or potentially clinically significant non lung cancer finding:  S - Significant.     Prior Lung Cancer Modifier:  No history of prior lung cancer.     1. Pulmonary consolidative nodule at the right lung base of 8 mm axially for which follow-up CT in 3 months would be suggested  2. Extensive coronary calcifications increasing the patient's coronary risk.         Test(s) Reviewed  I have personally reviewed the following in detail:  [x] Chest Xray Images   [x] CT Images   [x] Echocardiogram   [x] Labs   [] Other:       Assessment:         ICD-10-CM ICD-9-CM   1. Pulmonary nodules  R91.8 793.19   2. Abnormal CT lung screening  R91.8 793.19   3. Personal history of tobacco use, presenting hazards to health  Z87.891 V15.82        Plan:   Pulmonary nodules  -     CT Chest Without Contrast; Future; Expected date: 08/20/2025  -     Complete PFT with bronchodilator; Future; Expected date: 08/07/2025    Abnormal CT lung screening  -     Ambulatory referral/consult to Pulmonology  -     CT Chest Without Contrast; Future; Expected date: 08/20/2025  -     Complete PFT with bronchodilator; Future; Expected date: 08/07/2025    Personal history of tobacco use, presenting hazards to health  -     CT Chest Without Contrast; Future; Expected date: 08/20/2025  -     Complete PFT with bronchodilator; Future; Expected date: 08/07/2025         Assessment & Plan    Reviewed CT Chest findings, noting scattered tiny pulmonary nodules and a 1 cm area of inflammation in right lower lung.  Findings likely not concerning for lung cancer given location and appearance, but warrant follow-up due to age and smoking history.  No need for new inhalers based on minimal respiratory symptoms and infrequent albuterol use.    EMPHYSEMA:  - Informed patient about presence of very mild emphysema in upper lungs, likely due to smoking history.  - Continue albuterol inhaler as needed.    ABNORMAL LUNG FINDING:  - Explained CT Chest images, pointing out relevant structures and findings.  - Discussed significance of 1 cm inflammatory area in right lower lung, emphasizing it is likely not cancerous but requires monitoring.  - Ordered follow-up CT Chest in 3 months (mid-August) to monitor inflammatory area in right lower lung.  - Ordered pulmonary function test to be performed at follow-up visit to assess lung function  in context of smoking history.      Follow up in about 2 months (around 8/10/2025).     See labs and med orders.    Medications have been reviewed and reconciled.      This note was generated with the assistance of ambient listening technology. Verbal consent was obtained by the patient and accompanying visitor(s) for the recording of patient appointment to facilitate this note. I attest to having reviewed and edited the generated note for accuracy, though some syntax or spelling errors may persist. Please contact the author of this note for any clarification.         [1]   Social History  Tobacco Use    Smoking status: Former     Current packs/day: 0.00     Average packs/day: 0.8 packs/day for 38.0 years (28.5 ttl pk-yrs)     Types: Cigarettes     Start date: 5/15/1987     Quit date: 5/15/2025     Years since quittin.0    Smokeless tobacco: Never    Tobacco comments:     Needs help to quit; had problems getting chantix   Substance Use Topics    Alcohol use: No    Drug use: No   [2]   Current Outpatient Medications on File Prior to Visit   Medication Sig Dispense Refill    albuterol-ipratropium (DUO-NEB) 2.5 mg-0.5 mg/3 mL nebulizer solution Take 3 mLs by nebulization as needed.      amLODIPine (NORVASC) 5 MG tablet Take 1 tablet (5 mg total) by mouth every evening. 90 tablet 1    aspirin (ECOTRIN) 81 MG EC tablet Take 81 mg by mouth every evening.      clonazePAM (KLONOPIN) 1 MG tablet Take 1 tablet (1 mg total) by mouth daily as needed for Anxiety. 30 tablet 1    clopidogreL (PLAVIX) 75 mg tablet Take 1 tablet (75 mg total) by mouth once daily. 90 tablet 1    ezetimibe (ZETIA) 10 mg tablet Take 1 tablet (10 mg total) by mouth once daily. 90 tablet 1    folic acid (FOLVITE) 1 MG tablet Take 1 tablet (1 mg total) by mouth every evening. 90 tablet 3    methotrexate 25 mg/mL injection INJECT 0.6 MLS (15 MG TOTAL) INTO THE SKIN EVERY 7 DAYS. 6 mL 1    multivitamin with minerals tablet Take 1 tablet by mouth once  daily.      nitroGLYCERIN (NITROSTAT) 0.4 MG SL tablet Place 1 tablet (0.4 mg total) under the tongue every 5 (five) minutes as needed for Chest pain. 25 tablet 0    rosuvastatin (CRESTOR) 40 MG Tab TAKE 1 TABLET BY MOUTH EVERY DAY IN THE EVENING 90 tablet 0    sertraline (ZOLOFT) 100 MG tablet Take 100 mg by mouth once daily.      hydrOXYzine pamoate (VISTARIL) 25 MG Cap Take 25-50 mg by mouth 2 (two) times daily.      ondansetron (ZOFRAN-ODT) 8 MG TbDL Take 1 tablet (8 mg total) by mouth every 6 (six) hours as needed (nausea). 20 tablet 1    polyethylene glycol (GLYCOLAX) 17 gram/dose powder Take by mouth.       No current facility-administered medications on file prior to visit.

## 2025-06-10 NOTE — PROGRESS NOTES
[facility]      THORACIC MULTIDISCIPLINARY TUMOR BOARD  PATIENT REVIEW FORM  ___________________________________________________________________    CLINIC #: [id number]  DATE: 06/10/2025    TUMOR SITE:   Cancer Type: Other (lung nodule)     Tumor Laterality: Right    Cancer Staging Complete: No     Presenting Hospital / Clinic: [facility]     Virtual Tumor Board Conference: In person       PRESENTER:   Presenter: Dr. Mars     Specialties Present: Hematology; Medical Oncology; Radiation Oncology; Surgical Oncology; Pathology; Navigation; Research; Integrative Oncology; Radiology; Pulmonology       PATIENT SUMMARY:   54 y.o. female presents for evaluation. Pulmonary evaluation for pulmoanry nodules referred by RUSLAN Crystal.     Ms. Nguyen reports night sweats for a few years, attributed to menopause. She occasionally expectorates mucus, but not on a daily or weekly basis. Ms. Nguyen has a history of rheumatoid arthritis and antiphospholipid syndrome, well-controlled with methotrexate. She stopped smoking approximately 3 weeks ago, having previously smoked half a pack per day for 40 years. Ms. Nguyen has an albuterol inhaler for use when ill, but infrequently requires it.      Ms. Nguyen denies coughing, wheezing, shortness of breath, fevers, chills, and unexplained weight loss. She denies any family history of lung disease other than her grandmother's lung cancer. She has no acute complaints.      [place]     Smoking Hx: 20 pack years, quit 3 weeks ago    LDCT 5/19/25:  FINDINGS:  Lungs: A calcified 2 mm nodule is noted right lung apex image 192 sequence 4.   A noncalcified 3 mm nodule is noted right upper lobe image 239.  A nodular region of consolidation is noted at the right lung base 10 mm x 6 mm axially.  Close follow-up is necessary with three-month CT imaging.  A  2 mm nodule is noted at the left lower lobe image 321 sequence 4.  Some band like changes are noted within the lingula  A 3 mm nodule is noted left lower lobe image 362  A 4 mm nodule is noted left lower lobe image 386.  A 4 mm nodule is noted right lower lobe image 371  Some emphysematous change or for trapping is noted in the lung bases in particular bilaterally     Pleura:   No effusion..   Heart and pericardium: Normal size without effusion.  Aorta and vasculature: Evidence coronary calcifications are noted.  Atherosclerotic calcifications are noted at the aortic arch.  Chest wall and skeletal structures: Evidence of breast augmentation is noted.  Upper abdomen: Evidence of anterior abdominal wall hernia mesh repair is noted partially visualized.    Impression:  Lung-RADS Category: 4A - Suspicious - consultation advised - possible next steps 3 month LDCT -in some scenarios PET/CT.    Background Information  Patient Status: a new patient  Reason for Consultation: Initial Presentation  Treatment to Date: None       BOARD RECOMMENDATIONS:   Recommended Plan (General)  Recommended Plan: Additional observation  Recommended Plan Note: follow up with CT chest in 3 months       CONSULT NEEDED:     [] Surgery    [] Hem/Onc    [] Rad/Onc    [] Dietary                 [] Social Service    [] Psychology       [] Pulmonology    Cancer Staging   No matching staging information was found for the patient.        [x] [facility] Treatment Guidelines reviewed and care planned is consistent with guidelines.         (i.e., NCCN, NCI, PD, ACO, AUA, etc.)    PRESENTATION AT CANCER CONFERENCE:   Presentation at Cancer Conference: Prospective       CLINICAL TRIAL ELIGIBILITY:   Clinical Trial Eligibility  Clinical Trial Eligibility: Not discussed         Vanna Aldridge

## 2025-07-15 DIAGNOSIS — I77.9 MILD CAROTID ARTERY DISEASE: Chronic | ICD-10-CM

## 2025-07-15 DIAGNOSIS — I25.10 CORONARY ARTERY DISEASE INVOLVING NATIVE CORONARY ARTERY OF NATIVE HEART WITHOUT ANGINA PECTORIS: Chronic | ICD-10-CM

## 2025-07-15 RX ORDER — CLOPIDOGREL BISULFATE 75 MG/1
75 TABLET ORAL
Qty: 90 TABLET | Refills: 0 | Status: SHIPPED | OUTPATIENT
Start: 2025-07-15

## 2025-07-24 ENCOUNTER — PATIENT MESSAGE (OUTPATIENT)
Dept: ENDOCRINOLOGY | Facility: CLINIC | Age: 55
End: 2025-07-24
Payer: COMMERCIAL

## 2025-07-24 DIAGNOSIS — E04.1 THYROID NODULE: ICD-10-CM

## 2025-07-24 DIAGNOSIS — C73 THYROID CANCER: Primary | ICD-10-CM

## 2025-07-25 ENCOUNTER — HOSPITAL ENCOUNTER (OUTPATIENT)
Dept: RADIOLOGY | Facility: HOSPITAL | Age: 55
Discharge: HOME | End: 2025-07-25
Attending: INTERNAL MEDICINE
Payer: COMMERCIAL

## 2025-07-25 PROCEDURE — 76536 US EXAM OF HEAD AND NECK: CPT | Mod: TC,PO

## 2025-07-25 PROCEDURE — 76536 US EXAM OF HEAD AND NECK: CPT | Mod: 26,,, | Performed by: RADIOLOGY

## 2025-07-30 ENCOUNTER — TELEPHONE (OUTPATIENT)
Dept: ENDOCRINOLOGY | Facility: CLINIC | Age: 55
End: 2025-07-30
Payer: COMMERCIAL

## 2025-07-31 ENCOUNTER — OFFICE VISIT (OUTPATIENT)
Dept: ENDOCRINOLOGY | Facility: CLINIC | Age: 55
End: 2025-07-31
Payer: COMMERCIAL

## 2025-07-31 DIAGNOSIS — C73 THYROID CANCER: Primary | ICD-10-CM

## 2025-07-31 NOTE — PROGRESS NOTES
The patient location is: [place]        Visit type: audiovisual    Face to Face time with patient: 10  12 minutes of total time spent on the encounter, which includes face to face time and non-face to face time preparing to see the patient (eg, review of tests), Obtaining and/or reviewing separately obtained history, Documenting clinical information in the electronic or other health record, Independently interpreting results (not separately reported) and communicating results to the patient/family/caregiver, or Care coordination (not separately reported).         Each patient to whom he or she provides medical services by telemedicine is:  (1) informed of the relationship between the physician and patient and the respective role of any other health care provider with respect to management of the patient; and (2) notified that he or she may decline to receive medical services by telemedicine and may withdraw from such care at any time.    Notes:               CHIEF COMPLAINT:  Oncocytic follicular variant papillary thyroid cancer  54 y.o. old being seen as a f/u. Was seeing DR. Suarez.  In 2023 had an FNA that was suspicious for follicular neoplasm.  Subsequently had a left lobectomy in December of 2023 (Dr. Pravin Mujica) showing oncocytic follicular variant papillary thyroid cancer.           THYROID:  She is not currently on thyroid medication. Right thyroid nodule biopsy from July 2024 was benign. Left thyroid has been surgically removed. TSH is 1.7, within normal range. Thyroglobulin increased from 46 to 54. Ultrasound findings remain unchanged from previous exam. She denies difficulty swallowing, heart racing, or tremors. She reports voice changes since surgery, describing a raspier cough.    This note was generated with the assistance of ambient listening technology. Verbal consent was obtained by the patient and accompanying visitor(s) for the recording of patient appointment to facilitate this note. I attest  to having reviewed and edited the generated note for accuracy, though some syntax or spelling errors may persist. Please contact the author of this note for any clarification.                 7/22/24- Right    Thyroid, right, fine needle aspiration (cytology):  [facility] Thyroid Cytology Category: Benign  Consistent with a benign follicular nodule.    Other findings and comments:  Benign follicular epithelial cells.  Blood present.  Colloid present.         08/24/2023  LEFT THYROID, FINE NEEDLE ASPIRATE:   - SUSPICIOUS FOR FOLLICULAR NEOPLASM: HURTHLE CELL TYPE.       12/27/2023  Left lobectomy  Procedure   - Left lobectomy     TUMOR   Tumor Focality   - Unifocal     Tumor Site   - Left lobe     Tumor Size   - 2.2 x 1.4 x 1.0 cm.     Histologic Tumor Types and Subtypes   - Oncocytic follicular variant papillary carcinoma     Tumor Proliferative Activity   Mitotic Rate   - Less than 3 mitoses per 2mm2     Tumor Necrosis   - Not identified     Angioinvasion (vascular invasion)   - Not identified     Lymphatic Invasion   - Present     Perineural Invasion   - Not identified     Extrathyroidal Extension   - Not identified     Margin Status   - All margins negative for carcinoma           PAST MEDICAL HISTORY/PAST SURGICAL HISTORY:  Reviewed in [facility]    SOCIAL HISTORY: Reviewed in [facility]    FAMILY HISTORY:  1st cousin had thyroidectomy. NO known thyroid cancer. Son with DM 1.     MEDICATIONS/ALLERGIES: The patient's MedCard has been updated and reviewed.            PE:    GENERAL: Well developed, well nourished.  NECK: Supple, trachea midline, no palpable thyroid nodules.  Surgical scar intact  CHEST: Resp even and unlabored, CTA bilateral.  CARDIAC: RRR, S1, S2 heard, no murmurs, rubs, S3, or S4    LABS/Radiology     Latest Reference Range & Units 07/25/25 14:50   TSH 0.400 - 4.000 uIU/mL 1.709      Latest Reference Range & Units 07/25/25 14:50   Thyroglobulin Antibody, S <1.8 IU/mL <1.8   Thyroglobulin, Tumor Marker,  S < or = 33 ng/mL 54       US SOFT TISSUE HEAD NECK     CLINICAL HISTORY:  Malignant neoplasm of thyroid gland     TECHNIQUE:  Ultrasound of the thyroid and cervical lymph nodes was performed.     COMPARISON:  Multiple priors, most recent 06/25/2024.     FINDINGS:  Postsurgical changes of left thyroidectomy.  No residual thyroid tissue identified.  Morphologically normal cervical lymph nodes.     The isthmus measures 0.1 cm.     The right thyroid lobe measures 1.5 x 4.3 x 1.8 cm.  Right lower pole hypoechoic solid nodule measures 1.1 x 0.8 x 1.2 (TI-RADS 4), no significant change.  Prior biopsy of this nodule showed benign finding.     Impression:     No evidence of local recurrence or metastatic disease in the neck.     Stable right thyroid lobe TI-RADS nodule, which previously underwent tissue sampling showing benign etiology.      ASSESSMENT/PLAN:  1. Oncocytic follicular variant papillary thyroid cancer-status post left Assessment & Plan    C73 Thyroid cancer    C73 THYROID CANCER:  - Reviewed thyroid function labs: TSH 1.7, indicating adequate hormone production by remaining thyroid lobe.  - Assessed thyroglobulin levels: 54, slightly increased from previous 46 but within acceptable range given partial thyroid presence.  - Evaluated US images: right thyroid nodule unchanged from previous exam, no concerning features noted.  Compare to last year's ultrasound images  - Considered patient's history of right nodule biopsy in July 2024 with benign results.  - Determined annual follow-up with labs and US appropriate based on current findings and clinical status.  - Explained that slight fluctuations in thyroglobulin levels are expected and not concerning unless there are significant changes.  - Clarified that the remaining thyroid lobe is producing sufficient hormone based on current TSH levels.  - Follow up in 1 year with repeat thyroid function tests (TSH), ultrasound, and thyroglobulin level.    PLAN SUMMARY:  -  Thyroid function labs and ultrasound images reviewed  - No changes to current treatment plan  - Follow-up in 1 year with repeat thyroid function tests (TSH), ultrasound, and thyroglobulin level             FOLLOWUP  F/U 1 yr TSH, thyroglobulin, thyroid ultrasound

## 2025-08-18 PROBLEM — R91.8 ABNORMAL CT LUNG SCREENING: Status: ACTIVE | Noted: 2025-08-18

## 2025-08-23 DIAGNOSIS — E78.2 MIXED DYSLIPIDEMIA: Chronic | ICD-10-CM

## 2025-08-25 RX ORDER — ROSUVASTATIN CALCIUM 40 MG/1
40 TABLET, COATED ORAL NIGHTLY
Qty: 90 TABLET | Refills: 0 | Status: SHIPPED | OUTPATIENT
Start: 2025-08-25